# Patient Record
Sex: FEMALE | Race: WHITE | NOT HISPANIC OR LATINO | Employment: OTHER | URBAN - METROPOLITAN AREA
[De-identification: names, ages, dates, MRNs, and addresses within clinical notes are randomized per-mention and may not be internally consistent; named-entity substitution may affect disease eponyms.]

---

## 2017-01-13 ENCOUNTER — OFFICE VISIT (OUTPATIENT)
Dept: ORTHOPEDIC SURGERY | Facility: CLINIC | Age: 77
End: 2017-01-13

## 2017-01-13 VITALS — WEIGHT: 114 LBS | BODY MASS INDEX: 19.46 KG/M2 | TEMPERATURE: 98.1 F | HEIGHT: 64 IN

## 2017-01-13 DIAGNOSIS — S46.011A ROTATOR CUFF STRAIN, RIGHT, INITIAL ENCOUNTER: Primary | ICD-10-CM

## 2017-01-13 PROCEDURE — 99202 OFFICE O/P NEW SF 15 MIN: CPT | Performed by: ORTHOPAEDIC SURGERY

## 2017-01-13 NOTE — PROGRESS NOTES
"  Patient: Bhumi Gimenez    YOB: 1940    Medical Record Number: [id number]    Chief Complaints:  Right shoulder pain    History of Present Illness:     76 y.o. female patient who presents for evaluation of her right shoulder.  In September she traveled to [place].  When she got home she noticed that she was having some posterior right shoulder pain.  The pain persisted for several months.  The pain is now completely resolved.  She tells me the shoulder is \"completely fine\".  She debated canceling this appointment.  Pain started out as severe and constant.  The pain was lateral and posterior lateral.  Again, this is now resolved.  Denies any weakness or mechanical symptoms.    Allergies: No Known Allergies    Home Medications:      Current Outpatient Prescriptions:   •  aspirin 81 MG EC tablet, Take by mouth., Disp: , Rfl:   •  carvedilol (COREG) 12.5 MG tablet, Take 1 tablet by mouth 2 (two) times a day., Disp: 180 tablet, Rfl: 3  •  Ferrous Sulfate (IRON SUPPLEMENT PO), Take by mouth., Disp: , Rfl:   •  furosemide (LASIX) 20 MG tablet, Take 1 tablet by mouth 2 (two) times a day., Disp: 180 tablet, Rfl: 3  •  losartan (COZAAR) 50 MG tablet, Take 1 tablet by mouth daily., Disp: 90 tablet, Rfl: 3  •  pancrelipase, Lip-Prot-Amyl, (CREON) 14514 UNITS capsule delayed-release particles capsule, Take 2 capsules by mouth 3 (three) times a day with meals., Disp: 536 capsule, Rfl: 3  •  pantoprazole (PROTONIX) 40 MG EC tablet, Take 1 tablet by mouth daily., Disp: 90 tablet, Rfl: 3  •  potassium chloride ER (K-TAB) 20 MEQ tablet controlled-release ER tablet, Take 1 tablet by mouth daily., Disp: 90 tablet, Rfl: 3  •  Probiotic capsule, Take by mouth., Disp: , Rfl:     Past Medical History   Diagnosis Date   • Anemia of chronic disorder    • Aortic sclerosis    • Chronic deep venous thrombosis      chronic bilateral DVT   • Chronic kidney disease    • Chronic pancreatitis      ACUTE PANCREATITIS 11/20/2013 "   • Diverticulosis    • Esophageal reflux    • Hepatomegaly      LIKELY SECONDARY TO ALCOHOL USE   • History of alcohol use      quit 11/13   • Hypertension    • Left foot drop      LEFT FOOD DROP SECONDARY TO PERONEAL NERVE INJURY   • Osteoarthritis    • PAF (paroxysmal atrial fibrillation)    • Peripheral neuropathy      LEFT LOWER EXTREMITY   • Personal history of gallstones    • Personal history of malignant neoplasm of skin      S/P REMOVAL FROM LEFT KNEE   • Takotsubo syndrome      Likely secondary to acute illness in 11/13.  EF as low as 20-25% 11/27/13.  EF 63% by echo 4/14/14.   • Thrombocytopenia        Past Surgical History   Procedure Laterality Date   • Corneal transplant         Social History     Occupational History   • Not on file.     Social History Main Topics   • Smoking status: Never Smoker   • Smokeless tobacco: Not on file   • Alcohol use Not on file      Comment: 3-4 DRINKS PER DAY PREVIOUSLY.  QUIT 11/2013   • Drug use: Not on file   • Sexual activity: Not on file      Social History     Social History Narrative       Family History   Problem Relation Age of Onset   • Alcohol abuse Mother    • Other Mother      CORONARY ARTERIOSCLEROSIS   • Cancer Father      prostate       Review of Systems:      Constitutional: Denies fever, shaking or chills   Eyes: Denies change in visual acuity   HEENT: Denies nasal congestion or sore throat   Respiratory: Denies cough or shortness of breath   Cardiovascular: Denies chest pain or edema  Endocrine: Denies tremors, palpitations, intolerance of heat or cold, polyuria, polydipsia.  GI: Denies abdominal pain, nausea, vomiting, bloody stools or diarrhea  : Denies frequency, urgency, incontinence, retention, or nocturia.  Musculoskeletal: Denies numbness tingling or loss of motor function except as above  Integument: Denies rash, lesion or ulceration   Neurologic: Denies headache or focal weakness, deficits  Heme: Denies epistaxis, spontaneous or excessive  "bleeding, epistaxis, hematuria, melena, fatigue, enlarged or tender lymph nodes.      All other pertinent positives and negatives as noted above in HPI.      Physical Exam: 76 y.o. female    Vitals:    01/13/17 1541   Temp: 98.1 °F (36.7 °C)   TempSrc: Temporal Artery    Weight: 114 lb (51.7 kg)   Height: 63.5\" (161.3 cm)       General:  Patient is awake and alert.  Appears in no acute distress or discomfort.    Psych:  Affect and demeanor are appropriate.    Eyes:  Conjunctiva and sclera appear grossly normal.  Eyes track well and EOM seem to be intact.    Ears:  No gross abnormalities.  Hearing adequate for the exam.    Cardiovascular:  Regular rate and rhythm.    Lungs:  Good chest expansion.  Breathing unlabored.    Lymph:  No palpable masses or adenopathy in the affected extremity    Extremities:  The right shoulder is examined.  Skin is benign.  There is a mass posteriorly on the arm which she says has been there for a number of years, unchanged.  No tenderness.  Full motion.  Good strength with elevation and abduction.  She does have some subtle weakness with external rotation but no discomfort on exam.         Radiology:   None    Assessment/Plan:  Right shoulder pain, resolved    It sounds like she had a rotator cuff strain.  This is now resolved.  She can follow-up as needed.    Sabino Harley MD    01/13/2017    CC to Niko Cheatham MD    "

## 2017-03-10 RX ORDER — FERROUS SULFATE 325(65) MG
325 TABLET ORAL 2 TIMES DAILY
Qty: 60 TABLET | Refills: 0 | Status: SHIPPED | OUTPATIENT
Start: 2017-03-10 | End: 2017-04-19 | Stop reason: SDUPTHER

## 2017-04-19 RX ORDER — FERROUS SULFATE 325(65) MG
TABLET ORAL
Qty: 60 TABLET | Refills: 0 | Status: SHIPPED | OUTPATIENT
Start: 2017-04-19 | End: 2017-05-26 | Stop reason: SDUPTHER

## 2017-04-28 ENCOUNTER — OFFICE VISIT (OUTPATIENT)
Dept: FAMILY MEDICINE CLINIC | Facility: CLINIC | Age: 77
End: 2017-04-28

## 2017-04-28 VITALS
WEIGHT: 110.8 LBS | HEIGHT: 64 IN | TEMPERATURE: 97.8 F | HEART RATE: 56 BPM | DIASTOLIC BLOOD PRESSURE: 78 MMHG | OXYGEN SATURATION: 99 % | BODY MASS INDEX: 18.92 KG/M2 | SYSTOLIC BLOOD PRESSURE: 150 MMHG

## 2017-04-28 DIAGNOSIS — K85.90 PANCREATITIS, RECURRENT: ICD-10-CM

## 2017-04-28 DIAGNOSIS — M21.372 LEFT FOOT DROP: ICD-10-CM

## 2017-04-28 DIAGNOSIS — I48.0 PAROXYSMAL ATRIAL FIBRILLATION (HCC): ICD-10-CM

## 2017-04-28 DIAGNOSIS — K82.8 BILIARY DYSKINESIA: ICD-10-CM

## 2017-04-28 DIAGNOSIS — I10 HYPERTENSION, ESSENTIAL: Primary | ICD-10-CM

## 2017-04-28 DIAGNOSIS — G57.92 NEUROPATHY OF LEFT LOWER EXTREMITY: ICD-10-CM

## 2017-04-28 DIAGNOSIS — N18.30 CHRONIC KIDNEY FAILURE, STAGE 3 (MODERATE) (HCC): ICD-10-CM

## 2017-04-28 DIAGNOSIS — K58.0 IRRITABLE BOWEL SYNDROME WITH DIARRHEA: ICD-10-CM

## 2017-04-28 PROCEDURE — 99214 OFFICE O/P EST MOD 30 MIN: CPT | Performed by: INTERNAL MEDICINE

## 2017-04-28 PROCEDURE — G0439 PPPS, SUBSEQ VISIT: HCPCS | Performed by: INTERNAL MEDICINE

## 2017-04-28 RX ORDER — POTASSIUM CHLORIDE 1500 MG/1
20 TABLET, FILM COATED, EXTENDED RELEASE ORAL DAILY
Qty: 90 TABLET | Refills: 1 | Status: SHIPPED | OUTPATIENT
Start: 2017-04-28
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
## 2017-04-28 NOTE — PATIENT INSTRUCTIONS
Diagnoses and all orders for this visit:    Hypertension, essential  Comments:  Monitor BP at work 1-2 times per week  Limit salt    Chronic kidney failure, stage 3 (moderate)  Comments:  Continue same medws  Follow up if symptoms change or worsen    Irritable bowel syndrome with diarrhea  Comments:  Stable at present time  No other acute changes    Pancreatitis, recurrent on Creon  Comments:  Now bernabe at present time  Creon working well    Paroxysmal atrial fibrillation  Comments:  Follow up  as planneed  In regular rhytym    Left foot drop  Comments:  Doing much better    Biliary dyskinesia  Comments:  Follow up as planneed  Feels good at present time    Neuropathy of left lower extremity  Comments:  Continue same medws  Follow up as planned    Other orders  -     pancrelipase, Lip-Prot-Amyl, (CREON) 53451 UNITS capsule delayed-release particles capsule; Take 2 capsules by mouth 3 (Three) Times a Day With Meals.  -     potassium chloride ER (K-TAB) 20 MEQ tablet controlled-release ER tablet; Take 1 tablet by mouth Daily.

## 2017-04-28 NOTE — PROGRESS NOTES
Bhumi Gimenez is a 77 y.o. female   Chief Complaint   Patient presents with   • Hypertension     follow up pt doing well no complains           Subjective   Hypertension   The current episode started more than 1 year ago. The problem has been waxing and waning since onset. The problem is controlled. Pertinent negatives include no anxiety, blurred vision, chest pain, headaches, malaise/fatigue, neck pain, orthopnea, palpitations, peripheral edema, PND, shortness of breath or sweats. There are no associated agents to hypertension. There are no known risk factors for coronary artery disease. Past treatments include beta blockers and angiotensin blockers. The current treatment provides no improvement. There are no compliance problems.  There is no history of angina, kidney disease, CAD/MI, CVA, heart failure, left ventricular hypertrophy, PVD, renovascular disease, retinopathy or a thyroid problem. There is no history of chronic renal disease, coarctation of the aorta, hyperaldosteronism, hypercortisolism, hyperparathyroidism, a hypertension causing med, pheochromocytoma or sleep apnea.        Bhumi Gimenez is a 77 y.o.female who presents with:follow up on blood pressure.  BP was elevated at 150  No other acute changes.  Plans to play golf frequently.  Now doing well otherwise.  Still working 6 days per week total of 40 hours          The following portions of the patient's history were reviewed and updated as appropriate: past medical history, surgeries, family history, allergies, current medications, past social history and problem list.    A comprehensive review of 14 systems was peformed  Review of Systems   Constitutional: Negative.  Negative for chills, fatigue, fever, malaise/fatigue and unexpected weight change.   HENT: Negative.  Negative for ear pain, hearing loss, sinus pressure, sore throat and tinnitus.    Eyes: Negative.  Negative for blurred vision, pain, discharge and redness.   Respiratory:  "Negative.  Negative for cough, shortness of breath and wheezing.    Cardiovascular: Negative.  Negative for chest pain, palpitations, orthopnea, leg swelling and PND.   Gastrointestinal: Negative for abdominal pain, constipation, diarrhea and nausea.   Endocrine: Negative.  Negative for cold intolerance and heat intolerance.   Genitourinary: Negative.  Negative for difficulty urinating, flank pain and urgency.   Musculoskeletal: Negative.  Negative for back pain, joint swelling, myalgias and neck pain.   Skin: Negative.  Negative for rash and wound.   Allergic/Immunologic: Negative.  Negative for environmental allergies and food allergies.   Neurological: Negative.  Negative for dizziness, seizures, numbness and headaches.   Hematological: Negative.  Negative for adenopathy. Does not bruise/bleed easily.   Psychiatric/Behavioral: Negative.  Negative for decreased concentration, dysphoric mood and sleep disturbance. The patient is not nervous/anxious.    All other systems reviewed and are negative.      I have reviewed the patient's medical history in detail and updated the computerized patient record.      Objective   Vitals:    04/28/17 1248   BP: 150/78   BP Location: Left arm   Patient Position: Sitting   Pulse: 56   Temp: 97.8 °F (36.6 °C)   TempSrc: Oral   SpO2: 99%   Weight: 110 lb 12.8 oz (50.3 kg)   Height: 63.5\" (161.3 cm)           Physical Exam   Constitutional: She appears well-developed and well-nourished.   HENT:   Head: Normocephalic and atraumatic.   Right Ear: External ear normal.   Left Ear: External ear normal.   Nose: Nose normal.   Mouth/Throat: Oropharynx is clear and moist.   Eyes: Conjunctivae and EOM are normal. Pupils are equal, round, and reactive to light.   Neck: Normal range of motion. Neck supple.   Cardiovascular: Normal rate, regular rhythm, normal heart sounds and intact distal pulses.    Pulmonary/Chest: Effort normal and breath sounds normal.   Abdominal: Soft. Bowel sounds are " normal.   Musculoskeletal: Normal range of motion.   Neurological: She is alert. She has normal reflexes.   Skin: Skin is warm and dry.   Psychiatric: She has a normal mood and affect. Her behavior is normal. Judgment and thought content normal.       Procedures                          Assessment/Plan     Diagnoses and all orders for this visit:    Hypertension, essential  Comments:  Monitor BP at work 1-2 times per week  Limit salt    Chronic kidney failure, stage 3 (moderate)  Comments:  Continue same medws  Follow up if symptoms change or worsen    Irritable bowel syndrome with diarrhea  Comments:  Stable at present time  No other acute changes    Pancreatitis, recurrent on Creon  Comments:  Now bernabe at present time  Creon working well    Paroxysmal atrial fibrillation  Comments:  Follow up  as planneed  In regular rhytym    Left foot drop  Comments:  Doing much better    Biliary dyskinesia  Comments:  Follow up as planneed  Feels good at present time    Neuropathy of left lower extremity  Comments:  Continue same medws  Follow up as planned    Other orders  -     pancrelipase, Lip-Prot-Amyl, (CREON) 77510 UNITS capsule delayed-release particles capsule; Take 2 capsules by mouth 3 (Three) Times a Day With Meals.  -     potassium chloride ER (K-TAB) 20 MEQ tablet controlled-release ER tablet; Take 1 tablet by mouth Daily.         Niko Cheatham MD  4/28/2017  12:54 PM

## 2017-05-26 RX ORDER — FERROUS SULFATE 325(65) MG
TABLET ORAL
Qty: 60 TABLET | Refills: 5 | Status: SHIPPED | OUTPATIENT
Start: 2017-05-26 | End: 2017-11-08 | Stop reason: SDUPTHER

## 2017-06-12 ENCOUNTER — OFFICE VISIT (OUTPATIENT)
Dept: ORTHOPEDIC SURGERY | Facility: CLINIC | Age: 77
End: 2017-06-12

## 2017-06-12 VITALS — HEIGHT: 64 IN | WEIGHT: 109.8 LBS | BODY MASS INDEX: 18.74 KG/M2 | TEMPERATURE: 98.2 F

## 2017-06-12 DIAGNOSIS — G89.29 CHRONIC RIGHT SHOULDER PAIN: Primary | ICD-10-CM

## 2017-06-12 DIAGNOSIS — M25.511 CHRONIC RIGHT SHOULDER PAIN: Primary | ICD-10-CM

## 2017-06-12 PROCEDURE — 99213 OFFICE O/P EST LOW 20 MIN: CPT | Performed by: ORTHOPAEDIC SURGERY

## 2017-06-12 PROCEDURE — 20610 DRAIN/INJ JOINT/BURSA W/O US: CPT | Performed by: ORTHOPAEDIC SURGERY

## 2017-06-12 PROCEDURE — 73030 X-RAY EXAM OF SHOULDER: CPT | Performed by: ORTHOPAEDIC SURGERY

## 2017-06-12 RX ADMIN — BUPIVACAINE HYDROCHLORIDE 2 ML: 5 INJECTION, SOLUTION PERINEURAL at 14:09

## 2017-06-12 RX ADMIN — LIDOCAINE HYDROCHLORIDE 2 ML: 10 INJECTION, SOLUTION INFILTRATION; PERINEURAL at 14:09

## 2017-06-12 RX ADMIN — METHYLPREDNISOLONE ACETATE 160 MG: 80 INJECTION, SUSPENSION INTRA-ARTICULAR; INTRALESIONAL; INTRAMUSCULAR; SOFT TISSUE at 14:09

## 2017-06-13 RX ORDER — METHYLPREDNISOLONE ACETATE 80 MG/ML
160 INJECTION, SUSPENSION INTRA-ARTICULAR; INTRALESIONAL; INTRAMUSCULAR; SOFT TISSUE
Status: COMPLETED | OUTPATIENT
Start: 2017-06-12 | End: 2017-06-12

## 2017-06-13 RX ORDER — LIDOCAINE HYDROCHLORIDE 10 MG/ML
2 INJECTION, SOLUTION INFILTRATION; PERINEURAL
Status: COMPLETED | OUTPATIENT
Start: 2017-06-12 | End: 2017-06-12

## 2017-06-13 RX ORDER — BUPIVACAINE HYDROCHLORIDE 5 MG/ML
2 INJECTION, SOLUTION PERINEURAL
Status: COMPLETED | OUTPATIENT
Start: 2017-06-12 | End: 2017-06-12

## 2017-06-13 NOTE — PROGRESS NOTES
Patient:Bhumi Gimenez    YOB: 1940    Medical Record Number:[id number]    Chief Complaints:  Right shoulder pain    History of Present Illness:     77 y.o. female patient who presents for evaluation of her right shoulder.  She tells me that her pain recurred about 2 weeks ago.  She cannot recall any specific inciting event or factor but the pain seemed to come on rather suddenly.  Her pain is in the same spot as before, along the posterior aspect the shoulder.  She describes as moderate, intermittent, and sharp.  The pain is worse with reaching and lifting.  No shooting pain down the arm.  No numbness or paresthesias.    Allergies:No Known Allergies    Home Medications:    Current Outpatient Prescriptions:   •  aspirin 81 MG EC tablet, Take by mouth., Disp: , Rfl:   •  carvedilol (COREG) 12.5 MG tablet, Take 1 tablet by mouth 2 (two) times a day., Disp: 180 tablet, Rfl: 3  •  ferrous sulfate 325 (65 FE) MG tablet, TAKE 1 TABLET TWICE DAILY, Disp: 60 tablet, Rfl: 5  •  furosemide (LASIX) 20 MG tablet, Take 1 tablet by mouth 2 (two) times a day., Disp: 180 tablet, Rfl: 3  •  losartan (COZAAR) 50 MG tablet, Take 1 tablet by mouth daily., Disp: 90 tablet, Rfl: 3  •  pancrelipase, Lip-Prot-Amyl, (CREON) 99510 UNITS capsule delayed-release particles capsule, Take 2 capsules by mouth 3 (Three) Times a Day With Meals., Disp: 540 capsule, Rfl: 1  •  pantoprazole (PROTONIX) 40 MG EC tablet, Take 1 tablet by mouth daily., Disp: 90 tablet, Rfl: 3  •  potassium chloride ER (K-TAB) 20 MEQ tablet controlled-release ER tablet, Take 1 tablet by mouth Daily., Disp: 90 tablet, Rfl: 1  •  Probiotic capsule, Take by mouth., Disp: , Rfl:     Past Medical History:   Diagnosis Date   • Anemia of chronic disorder    • Aortic sclerosis    • Chronic deep venous thrombosis     chronic bilateral DVT   • Chronic kidney disease    • Chronic pancreatitis     ACUTE PANCREATITIS 11/20/2013   • Diverticulosis    • Esophageal reflux     • Hepatomegaly     LIKELY SECONDARY TO ALCOHOL USE   • History of alcohol use     quit 11/13   • Hypertension    • Left foot drop     LEFT FOOD DROP SECONDARY TO PERONEAL NERVE INJURY   • Osteoarthritis    • PAF (paroxysmal atrial fibrillation)    • Peripheral neuropathy     LEFT LOWER EXTREMITY   • Personal history of gallstones    • Personal history of malignant neoplasm of skin     S/P REMOVAL FROM LEFT KNEE   • Takotsubo syndrome     Likely secondary to acute illness in 11/13.  EF as low as 20-25% 11/27/13.  EF 63% by echo 4/14/14.   • Thrombocytopenia        Past Surgical History:   Procedure Laterality Date   • CORNEAL TRANSPLANT         Social History     Occupational History   • Not on file.     Social History Main Topics   • Smoking status: Never Smoker   • Smokeless tobacco: Not on file   • Alcohol use Not on file      Comment: 3-4 DRINKS PER DAY PREVIOUSLY.  QUIT 11/2013   • Drug use: Not on file   • Sexual activity: Not on file      Social History     Social History Narrative       Family History   Problem Relation Age of Onset   • Alcohol abuse Mother    • Other Mother      CORONARY ARTERIOSCLEROSIS   • Cancer Father      prostate       Review of Systems:      Constitutional: Denies fever, shaking or chills   Eyes: Denies change in visual acuity   HEENT: Denies nasal congestion or sore throat   Respiratory: Denies cough or shortness of breath   Cardiovascular: Denies chest pain or edema  Endocrine: Denies tremors, palpitations, intolerance of heat or cold, polyuria, polydipsia.  GI: Denies abdominal pain, nausea, vomiting, bloody stools or diarrhea  : Denies frequency, urgency, incontinence, retention, or nocturia.  Musculoskeletal: Denies numbness tingling or loss of motor function except as above  Integument: Denies rash, lesion or ulceration   Neurologic: Denies headache or focal weakness, deficits  Heme: Denies epistaxis, spontaneous or excessive bleeding, epistaxis, hematuria, melena, fatigue,  "enlarged or tender lymph nodes.      All other pertinent positives and negatives as noted above in HPI.    Physical Exam:77 y.o. female  Vitals:    06/12/17 1540   Temp: 98.2 °F (36.8 °C)   TempSrc: Temporal Artery    Weight: 109 lb 12.8 oz (49.8 kg)   Height: 63.5\" (161.3 cm)       General:  Patient is awake and alert.  Appears in no acute distress or discomfort.    Psych:  Affect and demeanor are appropriate.    Extremities:  The right shoulder is examined.  Skin is benign.  She does have the mass over the posterior aspect the arm which is unchanged.  No significant tenderness.  Full motion.  She does have discomfort with elevation in the scapular plane and subtle weakness.  Otherwise, her exam is benign today.    Imaging:  AP, scapular Y, and axillary views of the right shoulder are ordered and reviewed today.  No comparison films are available.  The x-rays show some sclerosis of the acromion and a mildly diminished acromiohumeral interval measuring 6 mm.    Assessment/Plan:  Right shoulder rotator cuff tear    Her x-rays suggest a long-standing rotator cuff tear although, remarkably, her strength is actually fairly good.  We talked about options.  I recommended that we try an injection.  The risks, benefits, and alternatives were discussed.  She consented to proceed.  Going forward, I will release her to follow-up as needed.  If her symptoms persist or recur, I told her that I will be happy to see her back.    Large Joint Arthrocentesis  Date/Time: 6/12/2017 2:09 PM  Consent given by: patient  Site marked: site marked  Timeout: Immediately prior to procedure a time out was called to verify the correct patient, procedure, equipment, support staff and site/side marked as required   Supporting Documentation  Indications: pain and joint swelling   Procedure Details  Location: shoulder - R subacromial bursa  Preparation: Patient was prepped and draped in the usual sterile fashion  Needle size: 25 G  Approach: " posterior  Medications administered: 2 mL lidocaine 1 %; 160 mg methylPREDNISolone acetate 80 MG/ML; 2 mL bupivacaine  Patient tolerance: patient tolerated the procedure well with no immediate complications            Sabino Harley MD    06/12/2017

## 2017-10-16 ENCOUNTER — OFFICE VISIT (OUTPATIENT)
Dept: ORTHOPEDIC SURGERY | Facility: CLINIC | Age: 77
End: 2017-10-16

## 2017-10-16 VITALS — TEMPERATURE: 99.4 F | WEIGHT: 106 LBS | HEIGHT: 62 IN | BODY MASS INDEX: 19.51 KG/M2

## 2017-10-16 DIAGNOSIS — M75.121 COMPLETE TEAR OF RIGHT ROTATOR CUFF: Primary | ICD-10-CM

## 2017-10-16 PROCEDURE — 99213 OFFICE O/P EST LOW 20 MIN: CPT | Performed by: ORTHOPAEDIC SURGERY

## 2017-10-17 NOTE — PROGRESS NOTES
Patient:Bhumi Gimenez    YOB: 1940    Medical Record Number:[id number]    Chief Complaints:  New right shoulder injury with increased shoulder pain and weakness    History of Present Illness:     77 y.o. female patient who presents for follow-up of her right shoulder.  She tells me the last injection helped but the effects were transient.  She went on a trip to [place] and was hitting a golf ball when she felt a sharp pain in her right shoulder.  Her pain and weakness are now worse.  She describes pain as moderate, constant, and aching.  The pain is worse with reaching and lifting.  She tells me that she is having to use the left arm to raise the right at times.    Allergies:No Known Allergies    Home Medications:    Current Outpatient Prescriptions:   •  aspirin 81 MG EC tablet, Take by mouth., Disp: , Rfl:   •  carvedilol (COREG) 12.5 MG tablet, Take 1 tablet by mouth 2 (two) times a day., Disp: 180 tablet, Rfl: 3  •  ferrous sulfate 325 (65 FE) MG tablet, TAKE 1 TABLET TWICE DAILY, Disp: 60 tablet, Rfl: 5  •  furosemide (LASIX) 20 MG tablet, Take 1 tablet by mouth 2 (two) times a day., Disp: 180 tablet, Rfl: 3  •  losartan (COZAAR) 50 MG tablet, Take 1 tablet by mouth daily., Disp: 90 tablet, Rfl: 3  •  pancrelipase, Lip-Prot-Amyl, (CREON) 13822 UNITS capsule delayed-release particles capsule, Take 2 capsules by mouth 3 (Three) Times a Day With Meals., Disp: 540 capsule, Rfl: 1  •  pantoprazole (PROTONIX) 40 MG EC tablet, Take 1 tablet by mouth daily., Disp: 90 tablet, Rfl: 3  •  potassium chloride ER (K-TAB) 20 MEQ tablet controlled-release ER tablet, Take 1 tablet by mouth Daily., Disp: 90 tablet, Rfl: 1  •  Probiotic capsule, Take by mouth., Disp: , Rfl:     Past Medical History:   Diagnosis Date   • Anemia of chronic disorder    • Aortic sclerosis    • Chronic deep venous thrombosis     chronic bilateral DVT   • Chronic kidney disease    • Chronic pancreatitis     ACUTE PANCREATITIS  11/20/2013   • Diverticulosis    • Esophageal reflux    • Hepatomegaly     LIKELY SECONDARY TO ALCOHOL USE   • History of alcohol use     quit 11/13   • Hypertension    • Left foot drop     LEFT FOOD DROP SECONDARY TO PERONEAL NERVE INJURY   • Osteoarthritis    • PAF (paroxysmal atrial fibrillation)    • Peripheral neuropathy     LEFT LOWER EXTREMITY   • Personal history of gallstones    • Personal history of malignant neoplasm of skin     S/P REMOVAL FROM LEFT KNEE   • Takotsubo syndrome     Likely secondary to acute illness in 11/13.  EF as low as 20-25% 11/27/13.  EF 63% by echo 4/14/14.   • Thrombocytopenia        Past Surgical History:   Procedure Laterality Date   • CORNEAL TRANSPLANT         Social History     Occupational History   • Not on file.     Social History Main Topics   • Smoking status: Never Smoker   • Smokeless tobacco: Not on file   • Alcohol use Not on file      Comment: 3-4 DRINKS PER DAY PREVIOUSLY.  QUIT 11/2013   • Drug use: Not on file   • Sexual activity: Not on file      Social History     Social History Narrative       Family History   Problem Relation Age of Onset   • Alcohol abuse Mother    • Other Mother      CORONARY ARTERIOSCLEROSIS   • Cancer Father      prostate       Review of Systems:      Constitutional: Denies fever, shaking or chills   Eyes: Denies change in visual acuity   HEENT: Denies nasal congestion or sore throat   Respiratory: Denies cough or shortness of breath   Cardiovascular: Denies chest pain or edema  Endocrine: Denies tremors, palpitations, intolerance of heat or cold, polyuria, polydipsia.  GI: Denies abdominal pain, nausea, vomiting, bloody stools or diarrhea  : Denies frequency, urgency, incontinence, retention, or nocturia.  Musculoskeletal: Denies numbness tingling or loss of motor function except as above  Integument: Denies rash, lesion or ulceration   Neurologic: Denies headache or focal weakness, deficits  Heme: Denies epistaxis, spontaneous or  "excessive bleeding, epistaxis, hematuria, melena, fatigue, enlarged or tender lymph nodes.      All other pertinent positives and negatives as noted above in HPI.    Physical Exam:77 y.o. female  Vitals:    10/16/17 1535   Temp: 99.4 °F (37.4 °C)   TempSrc: Temporal Artery    Weight: 106 lb (48.1 kg)   Height: 62\" (157.5 cm)       General:  Patient is awake and alert.  Appears in no acute distress or discomfort.    Psych:  Affect and demeanor are appropriate.    Extremities:  The right shoulder is examined.  Skin is benign.  No atrophy, swellings, or masses.  She struggles with active elevation.  She has pain and weakness with elevation in the scapular plane.  Negative external rotation lag sign.  No instability of the shoulder.  Good motor and sensory function in the hand and wrist.  Palpable radial pulse.    Imaging:  None    Assessment/Plan:  Right rotator cuff tear    We discussed options including repeating the injection and/or physical therapy.  She is not interested in either of those options.  She is concerned that this issue is only going to continue getting worse.  She has expressed interest in pursuing further workup and potentially surgical options.  I'm going to refer her for an MRI and we will come up with a plan pending review that study.    Sabino Harley MD    10/16/2017    "

## 2017-10-26 ENCOUNTER — HOSPITAL ENCOUNTER (OUTPATIENT)
Dept: MRI IMAGING | Facility: HOSPITAL | Age: 77
Discharge: HOME | End: 2017-10-26
Attending: ORTHOPAEDIC SURGERY | Admitting: ORTHOPAEDIC SURGERY

## 2017-10-26 DIAGNOSIS — M75.121 COMPLETE TEAR OF RIGHT ROTATOR CUFF: ICD-10-CM

## 2017-10-26 PROCEDURE — 73221 MRI JOINT UPR EXTREM W/O DYE: CPT

## 2017-10-30 ENCOUNTER — TELEPHONE (OUTPATIENT)
Dept: ORTHOPEDIC SURGERY | Facility: CLINIC | Age: 77
End: 2017-10-30

## 2017-10-30 ENCOUNTER — PREP FOR SURGERY (OUTPATIENT)
Dept: OTHER | Facility: HOSPITAL | Age: 77
End: 2017-10-30

## 2017-10-30 DIAGNOSIS — M75.101 ROTATOR CUFF TEAR ARTHROPATHY, RIGHT: Primary | ICD-10-CM

## 2017-10-30 DIAGNOSIS — M12.811 ROTATOR CUFF TEAR ARTHROPATHY, RIGHT: Primary | ICD-10-CM

## 2017-10-30 RX ORDER — SODIUM CHLORIDE 0.9 % (FLUSH) 0.9 %
1-10 SYRINGE (ML) INJECTION AS NEEDED
Status: CANCELLED | OUTPATIENT
Start: 2017-11-16

## 2017-10-30 RX ORDER — CEFAZOLIN SODIUM 2 G/100ML
2 INJECTION, SOLUTION INTRAVENOUS ONCE
Status: CANCELLED | OUTPATIENT
Start: 2017-11-16 | End: 2017-10-30

## 2017-10-31 PROBLEM — M75.101 ROTATOR CUFF TEAR ARTHROPATHY, RIGHT: Status: ACTIVE | Noted: 2017-10-31

## 2017-10-31 PROBLEM — M12.811 ROTATOR CUFF TEAR ARTHROPATHY, RIGHT: Status: ACTIVE | Noted: 2017-10-31

## 2017-11-08 ENCOUNTER — APPOINTMENT (OUTPATIENT)
Dept: PREADMISSION TESTING | Facility: HOSPITAL | Age: 77
End: 2017-11-08

## 2017-11-08 VITALS
DIASTOLIC BLOOD PRESSURE: 76 MMHG | HEART RATE: 63 BPM | RESPIRATION RATE: 16 BRPM | WEIGHT: 108 LBS | OXYGEN SATURATION: 99 % | BODY MASS INDEX: 18.44 KG/M2 | SYSTOLIC BLOOD PRESSURE: 164 MMHG | TEMPERATURE: 98.1 F | HEIGHT: 64 IN

## 2017-11-08 DIAGNOSIS — M75.101 ROTATOR CUFF TEAR ARTHROPATHY, RIGHT: ICD-10-CM

## 2017-11-08 DIAGNOSIS — M12.811 ROTATOR CUFF TEAR ARTHROPATHY, RIGHT: ICD-10-CM

## 2017-11-08 LAB
ANION GAP SERPL CALCULATED.3IONS-SCNC: 13.2 MMOL/L
BACTERIA UR QL AUTO: NORMAL /HPF
BILIRUB UR QL STRIP: NEGATIVE
BUN BLD-MCNC: 16 MG/DL (ref 8–23)
BUN/CREAT SERPL: 21.9 (ref 7–25)
CALCIUM SPEC-SCNC: 9.1 MG/DL (ref 8.6–10.5)
CHLORIDE SERPL-SCNC: 99 MMOL/L (ref 98–107)
CLARITY UR: CLEAR
CO2 SERPL-SCNC: 21.8 MMOL/L (ref 22–29)
COLOR UR: YELLOW
CREAT BLD-MCNC: 0.73 MG/DL (ref 0.57–1)
DEPRECATED RDW RBC AUTO: 42.2 FL (ref 37–54)
ERYTHROCYTE [DISTWIDTH] IN BLOOD BY AUTOMATED COUNT: 12.3 % (ref 11.7–13)
GFR SERPL CREATININE-BSD FRML MDRD: 77 ML/MIN/1.73
GLUCOSE BLD-MCNC: 91 MG/DL (ref 65–99)
GLUCOSE UR STRIP-MCNC: NEGATIVE MG/DL
HCT VFR BLD AUTO: 33.3 % (ref 35.6–45.5)
HGB BLD-MCNC: 11 G/DL (ref 11.9–15.5)
HGB UR QL STRIP.AUTO: NEGATIVE
HYALINE CASTS UR QL AUTO: NORMAL /LPF
KETONES UR QL STRIP: NEGATIVE
LEUKOCYTE ESTERASE UR QL STRIP.AUTO: ABNORMAL
MCH RBC QN AUTO: 31.2 PG (ref 26.9–32)
MCHC RBC AUTO-ENTMCNC: 33 G/DL (ref 32.4–36.3)
MCV RBC AUTO: 94.3 FL (ref 80.5–98.2)
NITRITE UR QL STRIP: NEGATIVE
PH UR STRIP.AUTO: 6.5 [PH] (ref 5–8)
PLATELET # BLD AUTO: 266 10*3/MM3 (ref 140–500)
PMV BLD AUTO: 10.5 FL (ref 6–12)
POTASSIUM BLD-SCNC: 3.9 MMOL/L (ref 3.5–5.2)
PROT UR QL STRIP: NEGATIVE
RBC # BLD AUTO: 3.53 10*6/MM3 (ref 3.9–5.2)
RBC # UR: NORMAL /HPF
REF LAB TEST METHOD: NORMAL
SODIUM BLD-SCNC: 134 MMOL/L (ref 136–145)
SP GR UR STRIP: 1.01 (ref 1–1.03)
SQUAMOUS #/AREA URNS HPF: NORMAL /HPF
UROBILINOGEN UR QL STRIP: ABNORMAL
WBC NRBC COR # BLD: 6.7 10*3/MM3 (ref 4.5–10.7)
WBC UR QL AUTO: NORMAL /HPF

## 2017-11-08 PROCEDURE — 81001 URINALYSIS AUTO W/SCOPE: CPT | Performed by: ORTHOPAEDIC SURGERY

## 2017-11-08 PROCEDURE — 93010 ELECTROCARDIOGRAM REPORT: CPT | Performed by: INTERNAL MEDICINE

## 2017-11-08 PROCEDURE — 93005 ELECTROCARDIOGRAM TRACING: CPT

## 2017-11-08 PROCEDURE — 85027 COMPLETE CBC AUTOMATED: CPT | Performed by: ORTHOPAEDIC SURGERY

## 2017-11-08 PROCEDURE — 80048 BASIC METABOLIC PNL TOTAL CA: CPT | Performed by: ORTHOPAEDIC SURGERY

## 2017-11-08 PROCEDURE — 36415 COLL VENOUS BLD VENIPUNCTURE: CPT

## 2017-11-08 RX ORDER — LANOLIN ALCOHOL/MO/W.PET/CERES
325 CREAM (GRAM) TOPICAL
COMMUNITY

## 2017-11-08 NOTE — DISCHARGE INSTRUCTIONS
Take the following medications the morning of surgery with a small sip of water:    CARVEDILOL  LOSARTAN  PANTOPRAZOLE  CREON      General Instructions:  • Do not eat solid food after midnight the night before surgery.  • You may drink clear liquids day of surgery but must stop at least one hour before your hospital arrival time @ 0530 AM STOP DRINKING!!!  • It is beneficial for you to have a clear drink that contains carbohydrates the day of surgery.  We suggest a 12 to 20 ounce bottle of Gatorade or Powerade for non-diabetic patients or a 12 to 20 ounce bottle of G2 or Powerade Zero for diabetic patients.     Clear liquids are liquids you can see through.  Nothing red in color.     Plain water                               Sports drinks  Sodas                                   Gelatin (Jell-O)  Fruit juices without pulp such as white grape juice and apple juice  Popsicles that contain no fruit or yogurt  Tea or coffee (no cream or milk added)  Gatorade / Powerade  G2 / Powerade Zero    • Patients who avoid smoking, chewing tobacco and alcohol for 4 weeks prior to surgery have a reduced risk of post-operative complications.  Quit smoking as many days before surgery as you can.  • Do not smoke, use chewing tobacco or drink alcohol the day of surgery.   • Bring any papers given to you in the doctor’s office.  • Wear clean comfortable clothes and socks.  • Do not wear contact lenses or make-up.  Bring a case for your glasses.   • Bring crutches or walker if applicable.  • Remove all piercings.  Leave jewelry and any other valuables at home.  • The Pre-Admission Testing nurse will instruct you to bring medications if unable to obtain an accurate list in Pre-Admission Testing.        Preventing a Surgical Site Infection:  • For 2 to 3 days before surgery, avoid shaving with a razor because the razor can irritate skin and make it easier to develop an infection.  • The night prior to surgery sleep in a clean bed with  clean clothing.  Do not allow pets to sleep with you.  • Shower on the morning of surgery using a fresh bar of anti-bacterial soap (such as Dial) and clean washcloth.  Dry with a clean towel and dress in clean clothing.  • Ask your surgeon if you will be receiving antibiotics prior to surgery.  • Make sure you, your family, and all healthcare providers clean their hands with soap and water or an alcohol based hand  before caring for you or your wound.    Day of surgery:11- ARRIVE @ 0630 AM REPORT TO THE MAIN OR   Upon arrival, a Pre-op nurse and Anesthesiologist will review your health history, obtain vital signs, and answer questions you may have.  The only belongings needed at this time will be your home medications.  If you are staying overnight your family can leave the rest of your belongings in the car and bring them to your room later.  A Pre-op nurse will start an IV and you may receive medication in preparation for surgery, including something to help you relax.  Your family will be able to see you in the Pre-op area.  While you are in surgery your family should notify the waiting room  if they leave the waiting room area and provide a contact phone number.    Please be aware that surgery does come with discomfort.  We want to make every effort to control your discomfort so please discuss any uncontrolled symptoms with your nurse.   Your doctor will most likely have prescribed pain medications.      If you are going home after surgery you will receive individualized written care instructions before being discharged.  A responsible adult must drive you to and from the hospital on the day of your surgery and stay with you for 24 hours.    If you are staying overnight following surgery, you will be transported to your hospital room following the recovery period.  [facility] has all private rooms.    If you have any questions please call Pre-Admission Testing at  [phone].  Deductibles and co-payments are collected on the day of service. Please be prepared to pay the required co-pay, deductible or deposit on the day of service as defined by your plan.      2% CHLORAHEXIDINE GLUCONATE* CLOTH  Preparing or “prepping” skin before surgery can reduce the risk of infection at the surgical site. To make the process easier, [facility] has chosen disposable cloths moistened with a rinse-free, 2% Chlorhexidine Gluconate (CHG) antiseptic solution. The steps below outline the prepping process and should be carefully followed.        Use the prep cloth on the area that is circled in the diagram             Directions Night before Surgery 11- PM PRIOR TO SURGERY (RIGHT SHOULDER AREA)  1) Shower using a fresh bar of anti-bacterial soap (such as Dial) and clean washcloth.  Use a clean towel to completely dry your skin.  2) Do not use any lotions, oils or creams on your skin.  3) Open the package and remove 1 cloth, wipe your skin for 30 seconds in a circular motion.  Allow to dry for 3 minutes.  4) Repeat #3 with second cloth.  5) Do not touch your eyes, ears, or mouth with the prep cloth.  6) Allow the wet prep solution to air dry.  7) Discard the prep cloth and wash your hands with soap and water.   8) Dress in clean bed clothes and sleep on fresh clean bed sheets.   9) You may experience some temporary itching after the prep.    Directions Day of Surgery 11- AM PRIOR TO SURGERY (RIGHT SHOULDER AREA)  1) Repeat steps 1,2,3,4,5,6,7, and 9.   2) Dress in clean clothes before coming to the hospital.    BACTROBAN NASAL OINTMENT  There are many germs normally in your nose. Bactroban is an ointment that will help reduce these germs. Please follow these instructions for Bactroban use:    ____The day before surgery in the morning  Euae__[phone]______    ____The day before surgery in the evening              Iceb__[phone]_____    ____The day of surgery in the  morning    Eprd__[phone]______    **Squirt ½ package of Bactroban Ointment onto a cotton applicator and apply to inside of 1st nostril.  Squirt the remaining Bactroban and apply to the inside of the other nostril.

## 2017-11-13 ENCOUNTER — OFFICE VISIT (OUTPATIENT)
Dept: ORTHOPEDIC SURGERY | Facility: CLINIC | Age: 77
End: 2017-11-13

## 2017-11-13 VITALS — TEMPERATURE: 98.5 F | WEIGHT: 106.6 LBS | BODY MASS INDEX: 20.93 KG/M2 | HEIGHT: 60 IN

## 2017-11-13 DIAGNOSIS — Z01.818 PRE-OP EVALUATION: Primary | ICD-10-CM

## 2017-11-13 PROCEDURE — S0260 H&P FOR SURGERY: HCPCS | Performed by: ORTHOPAEDIC SURGERY

## 2017-11-14 NOTE — PROGRESS NOTES
History & Physical         Patient: Bhumi Gimenez    YOB: 1940    Medical Record Number: [id number]    Chief Complaints:   Chief Complaint   Patient presents with   • Right Shoulder - Pre-op Exam       History of Present Illness: 77 y.o. female presents with   Chief Complaint   Patient presents with   • Right Shoulder - Pre-op Exam   Reports a long history of progressively worsening pain, motion loss, and dysfunction.  Describes current pain as severe.  Has failed prolonged conservative treatment.  Denies any new complaints or issues.    Allergies: No Known Allergies    Medications:   Home Medications:    Current Outpatient Prescriptions:   •  aspirin 81 MG EC tablet, Take 81 mg by mouth Daily. HOLD PRIOR TO SURGERY, Disp: , Rfl:   •  carvedilol (COREG) 12.5 MG tablet, Take 1 tablet by mouth 2 (two) times a day., Disp: 180 tablet, Rfl: 3  •  ferrous sulfate 325 (65 FE) MG EC tablet, Take 325 mg by mouth Daily With Breakfast., Disp: , Rfl:   •  furosemide (LASIX) 20 MG tablet, Take 1 tablet by mouth 2 (two) times a day., Disp: 180 tablet, Rfl: 3  •  losartan (COZAAR) 50 MG tablet, Take 1 tablet by mouth daily., Disp: 90 tablet, Rfl: 3  •  pancrelipase, Lip-Prot-Amyl, (CREON) 58740 UNITS capsule delayed-release particles capsule, Take 2 capsules by mouth 3 (Three) Times a Day With Meals., Disp: 540 capsule, Rfl: 1  •  pantoprazole (PROTONIX) 40 MG EC tablet, Take 1 tablet by mouth daily., Disp: 90 tablet, Rfl: 3  •  potassium chloride ER (K-TAB) 20 MEQ tablet controlled-release ER tablet, Take 1 tablet by mouth Daily., Disp: 90 tablet, Rfl: 1  •  Probiotic capsule, Take 1 tablet by mouth Daily. HOLD PRIOR TO SURGERY, Disp: , Rfl:     Past Medical History:   Diagnosis Date   • Anemia of chronic disorder    • Aortic sclerosis    • Cancer     SKIN CANCER REMOVED FROM LEFT KNEE   • Chronic deep venous thrombosis     chronic bilateral DVT   • Chronic kidney disease    • Chronic pancreatitis     ACUTE  PANCREATITIS 11/20/2013   • Diverticulosis    • Esophageal reflux    • Full dentures    • Hepatomegaly     LIKELY SECONDARY TO ALCOHOL USE   • History of alcohol use     quit 11/13   • History of transfusion    • Hypertension    • Left foot drop     LEFT FOOD DROP SECONDARY TO PERONEAL NERVE INJURY   • Osteoarthritis     OSTEO   • PAF (paroxysmal atrial fibrillation)    • Peripheral neuropathy     LEFT LOWER EXTREMITY   • Personal history of gallstones    • Personal history of malignant neoplasm of skin     S/P REMOVAL FROM LEFT KNEE   • Takotsubo syndrome     Likely secondary to acute illness in 11/13.  EF as low as 20-25% 11/27/13.  EF 63% by echo 4/14/14.   • Thrombocytopenia           Past Surgical History:   Procedure Laterality Date   • CHOLECYSTECTOMY     • CORNEAL TRANSPLANT     • MULTIPLE TOOTH EXTRACTIONS      FULL MOUTH TEETH REMOVED          Social History     Occupational History   • Not on file.     Social History Main Topics   • Smoking status: Never Smoker   • Smokeless tobacco: Never Used   • Alcohol use No   • Drug use: No   • Sexual activity: Defer      Social History     Social History Narrative          Family History   Problem Relation Age of Onset   • Alcohol abuse Mother    • Other Mother      CORONARY ARTERIOSCLEROSIS   • Cancer Father      prostate   • Malig Hyperthermia Neg Hx        Review of Systems:     Constitutional:  Denies fever, shaking or chills   Eyes:  Denies change in visual acuity   HEENT:  Denies nasal congestion or sore throat   Respiratory:  Denies cough or shortness of breath   Cardiovascular:  Denies chest pain or edema   GI:  Denies abdominal pain, nausea, vomiting, bloody stools or diarrhea   Musculoskeletal:  Denies numbness tingling or loss of motor function except as outlined above in history of present illness.  Integument:  Denies rash, lesion or ulceration   Neurologic:  Denies headache or focal weakness, deficits      All other pertinent positives and negatives  as noted above in HPI.    Physical Exam: 77 y.o. female  General:  Patient is awake and alert.  Appears in no acute distress or discomfort.    Psych:  Affect and demeanor are appropriate.    Eyes:  Conjunctiva and sclera appear grossly normal.  Eyes track well and EOM seem to be intact.    Ears:  No gross abnormalities.  Hearing adequate for the exam.    Cardiovascular:  Regular rate and rhythm.    Lungs:  Good chest expansion.  Breathing unlabored.    Lymph:  No palpable adenopathy about neck or axilla.    Neck:  Supple.  Normal ROM.  Negative Spurling's for shoulder or arm pain.    Right upper extremity:  Skin benign and intact without evidence for swelling, masses or atrophy.  No palpable masses.  No focal areas of exquisite tenderness.  Shoulder ROM limited and uncomfortable--for elevation is approximately 150°, external rotation 30-35°, internal rotation to back pocket.  No evident instability or apprehension.  Hornblowers negative.  ER lag sign negative.  Good strength in deltoid, wrist and hand.  Intact sensation in arm, hand.  Palpable radial pulse with brisk cap refill.    Diagnostic Tests:  Lab Results   Component Value Date    GLUCOSE 91 11/08/2017    CALCIUM 9.1 11/08/2017     (L) 11/08/2017    K 3.9 11/08/2017    CO2 21.8 (L) 11/08/2017    CL 99 11/08/2017    BUN 16 11/08/2017    CREATININE 0.73 11/08/2017    EGFRIFAFRI 50 (L) 04/28/2017    EGFRIFNONA 77 11/08/2017    BCR 21.9 11/08/2017    ANIONGAP 13.2 11/08/2017     Lab Results   Component Value Date    WBC 6.70 11/08/2017    HGB 11.0 (L) 11/08/2017    HCT 33.3 (L) 11/08/2017    MCV 94.3 11/08/2017     11/08/2017     No results found for: INR, PROTIME     Imaging: Previous x-rays and MRI of the right shoulder are again reviewed.  The x-rays shows no profound abnormalities.  The MRI does show a large, retracted rotator cuff tear with significant atrophy and associated rotator cuff tear arthropathy.    Assessment:  Right shoulder rotator  cuff tear arthropathy    Plan: We had a thorough discussion regarding the risks, benefits and alternatives to an arthroplasty and non-surgical management versus surgery.  I explained that surgical risks include infection, hematoma, hardware related complications including failure of fixation, loosening, fracture, persistent pain and/or loss of motion, iatrogenic nerve and/or blood vessel injury resulting in permanent weakness, numbness or dysfunction, DVT, PE, positioning related neuropraxia, and anesthesia related complications resulting in death.  We discussed the indication for a reverse as opposed to a standard total shoulder and the risks inherent to the reverse including notching, glenoid loosening, instability, and traction related neuropraxia, any of which could result in persistent pain or problems requiring further surgery.  Lastly, we discussed the rehab and all that will be expected of the patient post operatively to ensure an optimal outcome.  The patient voiced understanding of the risks, benefits, and alternative forms of treatment that were discussed and the patient consents to proceed with the reverse arthroplasty.        Date: 11/13/2017    Sabino Harley MD

## 2017-11-16 ENCOUNTER — ANESTHESIA EVENT (OUTPATIENT)
Dept: PERIOP | Facility: HOSPITAL | Age: 77
End: 2017-11-16

## 2017-11-16 ENCOUNTER — HOSPITAL ENCOUNTER (INPATIENT)
Facility: HOSPITAL | Age: 77
LOS: 1 days | Discharge: HOME | End: 2017-11-17
Attending: ORTHOPAEDIC SURGERY | Admitting: ORTHOPAEDIC SURGERY

## 2017-11-16 ENCOUNTER — APPOINTMENT (OUTPATIENT)
Dept: GENERAL RADIOLOGY | Facility: HOSPITAL | Age: 77
End: 2017-11-16

## 2017-11-16 ENCOUNTER — ANESTHESIA (OUTPATIENT)
Dept: PERIOP | Facility: HOSPITAL | Age: 77
End: 2017-11-16

## 2017-11-16 DIAGNOSIS — M12.811 ROTATOR CUFF TEAR ARTHROPATHY, RIGHT: ICD-10-CM

## 2017-11-16 DIAGNOSIS — M75.101 ROTATOR CUFF TEAR ARTHROPATHY, RIGHT: ICD-10-CM

## 2017-11-16 PROBLEM — M12.819 ROTATOR CUFF ARTHROPATHY: Status: ACTIVE | Noted: 2017-11-16

## 2017-11-16 PROCEDURE — 63710000001 DIPHENHYDRAMINE PER 50 MG: Performed by: ORTHOPAEDIC SURGERY

## 2017-11-16 PROCEDURE — 25010000002 NEOSTIGMINE PER 0.5 MG: Performed by: NURSE ANESTHETIST, CERTIFIED REGISTERED

## 2017-11-16 PROCEDURE — 94799 UNLISTED PULMONARY SVC/PX: CPT

## 2017-11-16 PROCEDURE — 25010000002 MIDAZOLAM PER 1 MG: Performed by: ANESTHESIOLOGY

## 2017-11-16 PROCEDURE — 25010000002 PROPOFOL 10 MG/ML EMULSION: Performed by: NURSE ANESTHETIST, CERTIFIED REGISTERED

## 2017-11-16 PROCEDURE — 25010000002 ONDANSETRON PER 1 MG: Performed by: NURSE ANESTHETIST, CERTIFIED REGISTERED

## 2017-11-16 PROCEDURE — 23472 RECONSTRUCT SHOULDER JOINT: CPT | Performed by: ORTHOPAEDIC SURGERY

## 2017-11-16 PROCEDURE — 25010000002 FENTANYL CITRATE (PF) 100 MCG/2ML SOLUTION: Performed by: ANESTHESIOLOGY

## 2017-11-16 PROCEDURE — C1776 JOINT DEVICE (IMPLANTABLE): HCPCS | Performed by: ORTHOPAEDIC SURGERY

## 2017-11-16 PROCEDURE — 25010000002 VANCOMYCIN 10 G RECONSTITUTED SOLUTION: Performed by: ORTHOPAEDIC SURGERY

## 2017-11-16 PROCEDURE — 73020 X-RAY EXAM OF SHOULDER: CPT

## 2017-11-16 PROCEDURE — 0RRJ00Z REPLACEMENT OF RIGHT SHOULDER JOINT WITH REVERSE BALL AND SOCKET SYNTHETIC SUBSTITUTE, OPEN APPROACH: ICD-10-PCS | Performed by: ORTHOPAEDIC SURGERY

## 2017-11-16 PROCEDURE — 25010000002 FENTANYL CITRATE (PF) 100 MCG/2ML SOLUTION: Performed by: NURSE ANESTHETIST, CERTIFIED REGISTERED

## 2017-11-16 PROCEDURE — 25010000003 CEFAZOLIN IN DEXTROSE 2-4 GM/100ML-% SOLUTION: Performed by: ORTHOPAEDIC SURGERY

## 2017-11-16 PROCEDURE — 25010000003 CEFAZOLIN PER 500 MG: Performed by: ORTHOPAEDIC SURGERY

## 2017-11-16 DEVICE — TRY HUM STD COBLT 44MM: Type: IMPLANTABLE DEVICE | Site: SHOULDER | Status: FUNCTIONAL

## 2017-11-16 DEVICE — BEAR HUM COMPRNSV ARCOMXL PLS3 44 36: Type: IMPLANTABLE DEVICE | Site: SHOULDER | Status: FUNCTIONAL

## 2017-11-16 DEVICE — GLENOSPHERE VERSA DIAL FIX STD 36MM: Type: IMPLANTABLE DEVICE | Site: SHOULDER | Status: FUNCTIONAL

## 2017-11-16 DEVICE — SCRW FIX LK HEX 4.75X20MM: Type: IMPLANTABLE DEVICE | Site: SHOULDER | Status: FUNCTIONAL

## 2017-11-16 DEVICE — SCRW FIX LK HEX 4.75X15MM: Type: IMPLANTABLE DEVICE | Site: SHOULDER | Status: FUNCTIONAL

## 2017-11-16 DEVICE — BASEPLT GLEN COMPR MINI W TPR ADAPTR 25: Type: IMPLANTABLE DEVICE | Site: SHOULDER | Status: FUNCTIONAL

## 2017-11-16 DEVICE — SCRW COMPRNSV CNTRL 6.5X30MM REUS: Type: IMPLANTABLE DEVICE | Site: SHOULDER | Status: FUNCTIONAL

## 2017-11-16 DEVICE — IMPLANTABLE DEVICE: Type: IMPLANTABLE DEVICE | Site: SHOULDER | Status: FUNCTIONAL

## 2017-11-16 DEVICE — STEM HUM/SHLDR COMPREHENSIVE MINI 11X83MM: Type: IMPLANTABLE DEVICE | Site: SHOULDER | Status: FUNCTIONAL

## 2017-11-16 RX ORDER — MAGNESIUM HYDROXIDE 1200 MG/15ML
LIQUID ORAL AS NEEDED
Status: DISCONTINUED | OUTPATIENT
Start: 2017-11-16 | End: 2017-11-16 | Stop reason: HOSPADM

## 2017-11-16 RX ORDER — EPHEDRINE SULFATE 50 MG/ML
INJECTION, SOLUTION INTRAVENOUS AS NEEDED
Status: DISCONTINUED | OUTPATIENT
Start: 2017-11-16 | End: 2017-11-16 | Stop reason: SURG

## 2017-11-16 RX ORDER — DOCUSATE SODIUM 100 MG/1
100 CAPSULE, LIQUID FILLED ORAL 2 TIMES DAILY PRN
Status: DISCONTINUED | OUTPATIENT
Start: 2017-11-16 | End: 2017-11-17 | Stop reason: HOSPADM

## 2017-11-16 RX ORDER — SODIUM CHLORIDE 9 MG/ML
100 INJECTION, SOLUTION INTRAVENOUS CONTINUOUS
Status: DISCONTINUED | OUTPATIENT
Start: 2017-11-16 | End: 2017-11-17 | Stop reason: HOSPADM

## 2017-11-16 RX ORDER — OXYCODONE AND ACETAMINOPHEN 7.5; 325 MG/1; MG/1
2 TABLET ORAL EVERY 4 HOURS PRN
Status: DISCONTINUED | OUTPATIENT
Start: 2017-11-16 | End: 2017-11-17 | Stop reason: HOSPADM

## 2017-11-16 RX ORDER — DIPHENHYDRAMINE HYDROCHLORIDE 50 MG/ML
25 INJECTION INTRAMUSCULAR; INTRAVENOUS EVERY 6 HOURS PRN
Status: DISCONTINUED | OUTPATIENT
Start: 2017-11-16 | End: 2017-11-17 | Stop reason: HOSPADM

## 2017-11-16 RX ORDER — ACETAMINOPHEN 325 MG/1
325 TABLET ORAL EVERY 4 HOURS PRN
Status: DISCONTINUED | OUTPATIENT
Start: 2017-11-16 | End: 2017-11-17 | Stop reason: HOSPADM

## 2017-11-16 RX ORDER — CEFAZOLIN SODIUM 2 G/100ML
2 INJECTION, SOLUTION INTRAVENOUS ONCE
Status: COMPLETED | OUTPATIENT
Start: 2017-11-16 | End: 2017-11-16

## 2017-11-16 RX ORDER — ALBUTEROL SULFATE 2.5 MG/3ML
2.5 SOLUTION RESPIRATORY (INHALATION) ONCE AS NEEDED
Status: DISCONTINUED | OUTPATIENT
Start: 2017-11-16 | End: 2017-11-16 | Stop reason: HOSPADM

## 2017-11-16 RX ORDER — HYDRALAZINE HYDROCHLORIDE 20 MG/ML
5 INJECTION INTRAMUSCULAR; INTRAVENOUS
Status: DISCONTINUED | OUTPATIENT
Start: 2017-11-16 | End: 2017-11-16 | Stop reason: HOSPADM

## 2017-11-16 RX ORDER — ACETAMINOPHEN 325 MG/1
650 TABLET ORAL ONCE AS NEEDED
Status: DISCONTINUED | OUTPATIENT
Start: 2017-11-16 | End: 2017-11-16 | Stop reason: HOSPADM

## 2017-11-16 RX ORDER — HYDROCODONE BITARTRATE AND ACETAMINOPHEN 5; 325 MG/1; MG/1
1 TABLET ORAL ONCE AS NEEDED
Status: DISCONTINUED | OUTPATIENT
Start: 2017-11-16 | End: 2017-11-16 | Stop reason: HOSPADM

## 2017-11-16 RX ORDER — ACETAMINOPHEN 325 MG/1
650 TABLET ORAL EVERY 4 HOURS PRN
Status: DISCONTINUED | OUTPATIENT
Start: 2017-11-16 | End: 2017-11-17 | Stop reason: HOSPADM

## 2017-11-16 RX ORDER — PANTOPRAZOLE SODIUM 40 MG/1
40 TABLET, DELAYED RELEASE ORAL
Status: DISCONTINUED | OUTPATIENT
Start: 2017-11-17 | End: 2017-11-17 | Stop reason: HOSPADM

## 2017-11-16 RX ORDER — PROPOFOL 10 MG/ML
VIAL (ML) INTRAVENOUS AS NEEDED
Status: DISCONTINUED | OUTPATIENT
Start: 2017-11-16 | End: 2017-11-16 | Stop reason: SURG

## 2017-11-16 RX ORDER — BISACODYL 10 MG
10 SUPPOSITORY, RECTAL RECTAL DAILY PRN
Status: DISCONTINUED | OUTPATIENT
Start: 2017-11-16 | End: 2017-11-17 | Stop reason: HOSPADM

## 2017-11-16 RX ORDER — MIDAZOLAM HYDROCHLORIDE 1 MG/ML
1 INJECTION INTRAMUSCULAR; INTRAVENOUS
Status: DISCONTINUED | OUTPATIENT
Start: 2017-11-16 | End: 2017-11-16 | Stop reason: HOSPADM

## 2017-11-16 RX ORDER — FENTANYL CITRATE 50 UG/ML
50 INJECTION, SOLUTION INTRAMUSCULAR; INTRAVENOUS
Status: DISCONTINUED | OUTPATIENT
Start: 2017-11-16 | End: 2017-11-16 | Stop reason: HOSPADM

## 2017-11-16 RX ORDER — ONDANSETRON 2 MG/ML
INJECTION INTRAMUSCULAR; INTRAVENOUS AS NEEDED
Status: DISCONTINUED | OUTPATIENT
Start: 2017-11-16 | End: 2017-11-16 | Stop reason: SURG

## 2017-11-16 RX ORDER — FUROSEMIDE 20 MG/1
20 TABLET ORAL 2 TIMES DAILY
Status: DISCONTINUED | OUTPATIENT
Start: 2017-11-16 | End: 2017-11-17 | Stop reason: HOSPADM

## 2017-11-16 RX ORDER — FENTANYL CITRATE 50 UG/ML
INJECTION, SOLUTION INTRAMUSCULAR; INTRAVENOUS AS NEEDED
Status: DISCONTINUED | OUTPATIENT
Start: 2017-11-16 | End: 2017-11-16 | Stop reason: SURG

## 2017-11-16 RX ORDER — NALOXONE HCL 0.4 MG/ML
0.1 VIAL (ML) INJECTION
Status: DISCONTINUED | OUTPATIENT
Start: 2017-11-16 | End: 2017-11-17 | Stop reason: HOSPADM

## 2017-11-16 RX ORDER — SODIUM CHLORIDE 0.9 % (FLUSH) 0.9 %
1-10 SYRINGE (ML) INJECTION AS NEEDED
Status: DISCONTINUED | OUTPATIENT
Start: 2017-11-16 | End: 2017-11-16 | Stop reason: HOSPADM

## 2017-11-16 RX ORDER — NALOXONE HCL 0.4 MG/ML
0.2 VIAL (ML) INJECTION AS NEEDED
Status: DISCONTINUED | OUTPATIENT
Start: 2017-11-16 | End: 2017-11-16 | Stop reason: HOSPADM

## 2017-11-16 RX ORDER — LIDOCAINE HYDROCHLORIDE 20 MG/ML
INJECTION, SOLUTION INFILTRATION; PERINEURAL AS NEEDED
Status: DISCONTINUED | OUTPATIENT
Start: 2017-11-16 | End: 2017-11-16 | Stop reason: SURG

## 2017-11-16 RX ORDER — LOSARTAN POTASSIUM 50 MG/1
50 TABLET ORAL DAILY
Status: DISCONTINUED | OUTPATIENT
Start: 2017-11-17 | End: 2017-11-17 | Stop reason: HOSPADM

## 2017-11-16 RX ORDER — ROCURONIUM BROMIDE 10 MG/ML
INJECTION, SOLUTION INTRAVENOUS AS NEEDED
Status: DISCONTINUED | OUTPATIENT
Start: 2017-11-16 | End: 2017-11-16 | Stop reason: SURG

## 2017-11-16 RX ORDER — TRANEXAMIC ACID 100 MG/ML
INJECTION, SOLUTION INTRAVENOUS AS NEEDED
Status: DISCONTINUED | OUTPATIENT
Start: 2017-11-16 | End: 2017-11-16 | Stop reason: SURG

## 2017-11-16 RX ORDER — FAMOTIDINE 10 MG/ML
20 INJECTION, SOLUTION INTRAVENOUS ONCE
Status: COMPLETED | OUTPATIENT
Start: 2017-11-16 | End: 2017-11-16

## 2017-11-16 RX ORDER — ONDANSETRON 2 MG/ML
4 INJECTION INTRAMUSCULAR; INTRAVENOUS ONCE AS NEEDED
Status: DISCONTINUED | OUTPATIENT
Start: 2017-11-16 | End: 2017-11-16 | Stop reason: HOSPADM

## 2017-11-16 RX ORDER — CARVEDILOL 12.5 MG/1
12.5 TABLET ORAL 2 TIMES DAILY
Status: DISCONTINUED | OUTPATIENT
Start: 2017-11-16 | End: 2017-11-17 | Stop reason: HOSPADM

## 2017-11-16 RX ORDER — FERROUS SULFATE 325(65) MG
325 TABLET ORAL
Status: DISCONTINUED | OUTPATIENT
Start: 2017-11-16 | End: 2017-11-17 | Stop reason: HOSPADM

## 2017-11-16 RX ORDER — MIDAZOLAM HYDROCHLORIDE 1 MG/ML
2 INJECTION INTRAMUSCULAR; INTRAVENOUS
Status: DISCONTINUED | OUTPATIENT
Start: 2017-11-16 | End: 2017-11-16 | Stop reason: HOSPADM

## 2017-11-16 RX ORDER — FENTANYL CITRATE 50 UG/ML
25 INJECTION, SOLUTION INTRAMUSCULAR; INTRAVENOUS
Status: DISCONTINUED | OUTPATIENT
Start: 2017-11-16 | End: 2017-11-16 | Stop reason: HOSPADM

## 2017-11-16 RX ORDER — LABETALOL HYDROCHLORIDE 5 MG/ML
5 INJECTION, SOLUTION INTRAVENOUS
Status: DISCONTINUED | OUTPATIENT
Start: 2017-11-16 | End: 2017-11-16 | Stop reason: HOSPADM

## 2017-11-16 RX ORDER — DIPHENHYDRAMINE HCL 25 MG
25 CAPSULE ORAL EVERY 6 HOURS PRN
Status: DISCONTINUED | OUTPATIENT
Start: 2017-11-16 | End: 2017-11-17 | Stop reason: HOSPADM

## 2017-11-16 RX ORDER — SODIUM CHLORIDE, SODIUM LACTATE, POTASSIUM CHLORIDE, CALCIUM CHLORIDE 600; 310; 30; 20 MG/100ML; MG/100ML; MG/100ML; MG/100ML
9 INJECTION, SOLUTION INTRAVENOUS CONTINUOUS
Status: DISCONTINUED | OUTPATIENT
Start: 2017-11-16 | End: 2017-11-16 | Stop reason: HOSPADM

## 2017-11-16 RX ORDER — GLYCOPYRROLATE 0.2 MG/ML
INJECTION INTRAMUSCULAR; INTRAVENOUS AS NEEDED
Status: DISCONTINUED | OUTPATIENT
Start: 2017-11-16 | End: 2017-11-16 | Stop reason: SURG

## 2017-11-16 RX ORDER — FLUMAZENIL 0.1 MG/ML
0.2 INJECTION INTRAVENOUS AS NEEDED
Status: DISCONTINUED | OUTPATIENT
Start: 2017-11-16 | End: 2017-11-16 | Stop reason: HOSPADM

## 2017-11-16 RX ADMIN — NEOSTIGMINE METHYLSULFATE 3 MG: 1 INJECTION INTRAMUSCULAR; INTRAVENOUS; SUBCUTANEOUS at 09:51

## 2017-11-16 RX ADMIN — LIDOCAINE HYDROCHLORIDE 60 MG: 20 INJECTION, SOLUTION INFILTRATION; PERINEURAL at 08:39

## 2017-11-16 RX ADMIN — OXYCODONE HYDROCHLORIDE AND ACETAMINOPHEN 2 TABLET: 7.5; 325 TABLET ORAL at 13:31

## 2017-11-16 RX ADMIN — PANCRELIPASE 24000 UNITS OF LIPASE: 60000; 12000; 38000 CAPSULE, DELAYED RELEASE PELLETS ORAL at 13:31

## 2017-11-16 RX ADMIN — SODIUM CHLORIDE, POTASSIUM CHLORIDE, SODIUM LACTATE AND CALCIUM CHLORIDE 9 ML/HR: 600; 310; 30; 20 INJECTION, SOLUTION INTRAVENOUS at 08:11

## 2017-11-16 RX ADMIN — GLYCOPYRROLATE 0.6 MG: 0.2 INJECTION INTRAMUSCULAR; INTRAVENOUS at 09:51

## 2017-11-16 RX ADMIN — VANCOMYCIN HYDROCHLORIDE 750 MG: 10 INJECTION, POWDER, LYOPHILIZED, FOR SOLUTION INTRAVENOUS at 07:26

## 2017-11-16 RX ADMIN — CARVEDILOL 12.5 MG: 12.5 TABLET, FILM COATED ORAL at 17:53

## 2017-11-16 RX ADMIN — DIPHENHYDRAMINE HYDROCHLORIDE 25 MG: 25 CAPSULE ORAL at 23:44

## 2017-11-16 RX ADMIN — CEFAZOLIN SODIUM 2 G: 2 INJECTION, SOLUTION INTRAVENOUS at 08:55

## 2017-11-16 RX ADMIN — FAMOTIDINE 20 MG: 10 INJECTION INTRAVENOUS at 08:11

## 2017-11-16 RX ADMIN — VANCOMYCIN HYDROCHLORIDE 750 MG: 10 INJECTION, POWDER, LYOPHILIZED, FOR SOLUTION INTRAVENOUS at 20:15

## 2017-11-16 RX ADMIN — EPHEDRINE SULFATE 10 MG: 50 INJECTION INTRAMUSCULAR; INTRAVENOUS; SUBCUTANEOUS at 09:25

## 2017-11-16 RX ADMIN — ROCURONIUM BROMIDE 20 MG: 10 INJECTION INTRAVENOUS at 08:39

## 2017-11-16 RX ADMIN — MIDAZOLAM HYDROCHLORIDE 1 MG: 1 INJECTION, SOLUTION INTRAMUSCULAR; INTRAVENOUS at 07:56

## 2017-11-16 RX ADMIN — ONDANSETRON 4 MG: 2 INJECTION INTRAMUSCULAR; INTRAVENOUS at 09:45

## 2017-11-16 RX ADMIN — ROCURONIUM BROMIDE 10 MG: 10 INJECTION INTRAVENOUS at 09:10

## 2017-11-16 RX ADMIN — PANCRELIPASE 24000 UNITS OF LIPASE: 60000; 12000; 38000 CAPSULE, DELAYED RELEASE PELLETS ORAL at 17:53

## 2017-11-16 RX ADMIN — PROPOFOL 100 MG: 10 INJECTION, EMULSION INTRAVENOUS at 08:39

## 2017-11-16 RX ADMIN — EPHEDRINE SULFATE 10 MG: 50 INJECTION INTRAMUSCULAR; INTRAVENOUS; SUBCUTANEOUS at 08:58

## 2017-11-16 RX ADMIN — CEFAZOLIN SODIUM 2 G: 1 INJECTION, POWDER, FOR SOLUTION INTRAMUSCULAR; INTRAVENOUS at 17:53

## 2017-11-16 RX ADMIN — FENTANYL CITRATE 50 MCG: 50 INJECTION, SOLUTION INTRAMUSCULAR; INTRAVENOUS at 07:59

## 2017-11-16 RX ADMIN — TRANEXAMIC ACID 1000 MG: 100 INJECTION, SOLUTION INTRAVENOUS at 09:32

## 2017-11-16 RX ADMIN — FUROSEMIDE 20 MG: 20 TABLET ORAL at 13:31

## 2017-11-16 RX ADMIN — FERROUS SULFATE TAB 325 MG (65 MG ELEMENTAL FE) 325 MG: 325 (65 FE) TAB at 13:31

## 2017-11-16 RX ADMIN — FENTANYL CITRATE 25 MCG: 50 INJECTION, SOLUTION INTRAMUSCULAR; INTRAVENOUS at 10:05

## 2017-11-16 RX ADMIN — OXYCODONE HYDROCHLORIDE AND ACETAMINOPHEN 2 TABLET: 7.5; 325 TABLET ORAL at 21:18

## 2017-11-16 NOTE — H&P (VIEW-ONLY)
History & Physical         Patient: Bhumi Gimenez    YOB: 1940    Medical Record Number: [id number]    Chief Complaints:   Chief Complaint   Patient presents with   • Right Shoulder - Pre-op Exam       History of Present Illness: 77 y.o. female presents with   Chief Complaint   Patient presents with   • Right Shoulder - Pre-op Exam   Reports a long history of progressively worsening pain, motion loss, and dysfunction.  Describes current pain as severe.  Has failed prolonged conservative treatment.  Denies any new complaints or issues.    Allergies: No Known Allergies    Medications:   Home Medications:    Current Outpatient Prescriptions:   •  aspirin 81 MG EC tablet, Take 81 mg by mouth Daily. HOLD PRIOR TO SURGERY, Disp: , Rfl:   •  carvedilol (COREG) 12.5 MG tablet, Take 1 tablet by mouth 2 (two) times a day., Disp: 180 tablet, Rfl: 3  •  ferrous sulfate 325 (65 FE) MG EC tablet, Take 325 mg by mouth Daily With Breakfast., Disp: , Rfl:   •  furosemide (LASIX) 20 MG tablet, Take 1 tablet by mouth 2 (two) times a day., Disp: 180 tablet, Rfl: 3  •  losartan (COZAAR) 50 MG tablet, Take 1 tablet by mouth daily., Disp: 90 tablet, Rfl: 3  •  pancrelipase, Lip-Prot-Amyl, (CREON) 80944 UNITS capsule delayed-release particles capsule, Take 2 capsules by mouth 3 (Three) Times a Day With Meals., Disp: 540 capsule, Rfl: 1  •  pantoprazole (PROTONIX) 40 MG EC tablet, Take 1 tablet by mouth daily., Disp: 90 tablet, Rfl: 3  •  potassium chloride ER (K-TAB) 20 MEQ tablet controlled-release ER tablet, Take 1 tablet by mouth Daily., Disp: 90 tablet, Rfl: 1  •  Probiotic capsule, Take 1 tablet by mouth Daily. HOLD PRIOR TO SURGERY, Disp: , Rfl:     Past Medical History:   Diagnosis Date   • Anemia of chronic disorder    • Aortic sclerosis    • Cancer     SKIN CANCER REMOVED FROM LEFT KNEE   • Chronic deep venous thrombosis     chronic bilateral DVT   • Chronic kidney disease    • Chronic pancreatitis     ACUTE  PANCREATITIS 11/20/2013   • Diverticulosis    • Esophageal reflux    • Full dentures    • Hepatomegaly     LIKELY SECONDARY TO ALCOHOL USE   • History of alcohol use     quit 11/13   • History of transfusion    • Hypertension    • Left foot drop     LEFT FOOD DROP SECONDARY TO PERONEAL NERVE INJURY   • Osteoarthritis     OSTEO   • PAF (paroxysmal atrial fibrillation)    • Peripheral neuropathy     LEFT LOWER EXTREMITY   • Personal history of gallstones    • Personal history of malignant neoplasm of skin     S/P REMOVAL FROM LEFT KNEE   • Takotsubo syndrome     Likely secondary to acute illness in 11/13.  EF as low as 20-25% 11/27/13.  EF 63% by echo 4/14/14.   • Thrombocytopenia           Past Surgical History:   Procedure Laterality Date   • CHOLECYSTECTOMY     • CORNEAL TRANSPLANT     • MULTIPLE TOOTH EXTRACTIONS      FULL MOUTH TEETH REMOVED          Social History     Occupational History   • Not on file.     Social History Main Topics   • Smoking status: Never Smoker   • Smokeless tobacco: Never Used   • Alcohol use No   • Drug use: No   • Sexual activity: Defer      Social History     Social History Narrative          Family History   Problem Relation Age of Onset   • Alcohol abuse Mother    • Other Mother      CORONARY ARTERIOSCLEROSIS   • Cancer Father      prostate   • Malig Hyperthermia Neg Hx        Review of Systems:     Constitutional:  Denies fever, shaking or chills   Eyes:  Denies change in visual acuity   HEENT:  Denies nasal congestion or sore throat   Respiratory:  Denies cough or shortness of breath   Cardiovascular:  Denies chest pain or edema   GI:  Denies abdominal pain, nausea, vomiting, bloody stools or diarrhea   Musculoskeletal:  Denies numbness tingling or loss of motor function except as outlined above in history of present illness.  Integument:  Denies rash, lesion or ulceration   Neurologic:  Denies headache or focal weakness, deficits      All other pertinent positives and negatives  as noted above in HPI.    Physical Exam: 77 y.o. female  General:  Patient is awake and alert.  Appears in no acute distress or discomfort.    Psych:  Affect and demeanor are appropriate.    Eyes:  Conjunctiva and sclera appear grossly normal.  Eyes track well and EOM seem to be intact.    Ears:  No gross abnormalities.  Hearing adequate for the exam.    Cardiovascular:  Regular rate and rhythm.    Lungs:  Good chest expansion.  Breathing unlabored.    Lymph:  No palpable adenopathy about neck or axilla.    Neck:  Supple.  Normal ROM.  Negative Spurling's for shoulder or arm pain.    Right upper extremity:  Skin benign and intact without evidence for swelling, masses or atrophy.  No palpable masses.  No focal areas of exquisite tenderness.  Shoulder ROM limited and uncomfortable--for elevation is approximately 150°, external rotation 30-35°, internal rotation to back pocket.  No evident instability or apprehension.  Hornblowers negative.  ER lag sign negative.  Good strength in deltoid, wrist and hand.  Intact sensation in arm, hand.  Palpable radial pulse with brisk cap refill.    Diagnostic Tests:  Lab Results   Component Value Date    GLUCOSE 91 11/08/2017    CALCIUM 9.1 11/08/2017     (L) 11/08/2017    K 3.9 11/08/2017    CO2 21.8 (L) 11/08/2017    CL 99 11/08/2017    BUN 16 11/08/2017    CREATININE 0.73 11/08/2017    EGFRIFAFRI 50 (L) 04/28/2017    EGFRIFNONA 77 11/08/2017    BCR 21.9 11/08/2017    ANIONGAP 13.2 11/08/2017     Lab Results   Component Value Date    WBC 6.70 11/08/2017    HGB 11.0 (L) 11/08/2017    HCT 33.3 (L) 11/08/2017    MCV 94.3 11/08/2017     11/08/2017     No results found for: INR, PROTIME     Imaging: Previous x-rays and MRI of the right shoulder are again reviewed.  The x-rays shows no profound abnormalities.  The MRI does show a large, retracted rotator cuff tear with significant atrophy and associated rotator cuff tear arthropathy.    Assessment:  Right shoulder rotator  cuff tear arthropathy    Plan: We had a thorough discussion regarding the risks, benefits and alternatives to an arthroplasty and non-surgical management versus surgery.  I explained that surgical risks include infection, hematoma, hardware related complications including failure of fixation, loosening, fracture, persistent pain and/or loss of motion, iatrogenic nerve and/or blood vessel injury resulting in permanent weakness, numbness or dysfunction, DVT, PE, positioning related neuropraxia, and anesthesia related complications resulting in death.  We discussed the indication for a reverse as opposed to a standard total shoulder and the risks inherent to the reverse including notching, glenoid loosening, instability, and traction related neuropraxia, any of which could result in persistent pain or problems requiring further surgery.  Lastly, we discussed the rehab and all that will be expected of the patient post operatively to ensure an optimal outcome.  The patient voiced understanding of the risks, benefits, and alternative forms of treatment that were discussed and the patient consents to proceed with the reverse arthroplasty.        Date: 11/13/2017    Sabino Harley MD

## 2017-11-16 NOTE — PLAN OF CARE
Problem: Patient Care Overview (Adult)  Goal: Plan of Care Review  Outcome: Ongoing (interventions implemented as appropriate)    11/16/17 5813   Coping/Psychosocial Response Interventions   Plan Of Care Reviewed With patient   Patient Care Overview   Progress improving   Outcome Evaluation   Outcome Summary/Follow up Plan PAIN CONTROLLED, ADEQUATE PO INTAKE, DTV 1800, NVI, DSG CDI, VSS- NO HTN NOTED       Goal: Adult Individualization and Mutuality  Outcome: Ongoing (interventions implemented as appropriate)  Goal: Discharge Needs Assessment  Outcome: Ongoing (interventions implemented as appropriate)    Problem: Self-Care Deficit (Adult,Obstetrics,Pediatric)  Goal: Improved Ability to Perform BADL and IADL  Outcome: Ongoing (interventions implemented as appropriate)    Problem: Fall Risk (Adult)  Goal: Absence of Falls  Outcome: Ongoing (interventions implemented as appropriate)    Problem: Pain, Acute (Adult)  Goal: Acceptable Pain Control/Comfort Level  Outcome: Ongoing (interventions implemented as appropriate)

## 2017-11-16 NOTE — PLAN OF CARE
Problem: Self-Care Deficit (Adult,Obstetrics,Pediatric)  Goal: Identify Related Risk Factors and Signs and Symptoms  Outcome: Outcome(s) achieved Date Met:  11/16/17 11/16/17 1215   Self-Care Deficit   Self-Care Deficit: Related Risk Factors surgery         Problem: Fall Risk (Adult)  Goal: Identify Related Risk Factors and Signs and Symptoms  Outcome: Outcome(s) achieved Date Met:  11/16/17 11/16/17 1215   Fall Risk   Fall Risk: Related Risk Factors gait/mobility problems;environment unfamiliar         Problem: Pain, Acute (Adult)  Goal: Identify Related Risk Factors and Signs and Symptoms  Outcome: Outcome(s) achieved Date Met:  11/16/17 11/16/17 1215   Pain, Acute   Related Risk Factors (Acute Pain) surgery

## 2017-11-16 NOTE — OP NOTE
Orthopaedic Operative Note    Facility: [facility]    Patient: Bhumi Gimenez    Medical Record Number: [id number]    YOB: 1940    Dictating Surgeon: Sabino Harley M.D.    Primary Care Physician: Niko Cheatham MD    Date of Operation:  11/16/2017    PREOPERATIVE DIAGNOSIS:  Right shoulder rotator cuff tear arthropathy         POSTOPERATIVE DIAGNOSIS: Right shoulder rotator cuff tear arthropathy         PROCEDURES PERFORMED:   Right reverse total shoulder arthroplasty           SURGEON: Sabino Harley MD      ASSISTANT: Luis Meade (First Assist)      ANESTHESIA:    Regional followed by General.          COMPLICATIONS: None.       SPECIMEN: None.       ESTIMATED BLOOD LOSS: Less than 100 mL.       IMPLANTS:    1. Biomet 11 mm mini comprehensive humeral stem with 44 standard tray and 36 plus 3 mm   humeral bearing.    2. Size 25 mm mini glenoid baseplate with one 6.5 mm central compression screw   and 4 peripheral 4.75 mm locking screws.    3. Size 36 mm Glenosphere.      INDICATIONS: The patient has a long history of shoulder pain and weakness   which has been nonresponse to conservative treatment.  I explained that surgical risks include infection, hematoma, hardware related complications including failure of fixation, loosening, fracture, persistent pain and/or loss of motion, iatrogenic nerve and/or blood vessel injury resulting in permanent weakness, numbness or dysfunction, DVT, PE, positioning related neuropraxia, and anesthesia related complications resulting in death.  We discussed the indication for a reverse as opposed to a standard total shoulder and the risks inherent to the reverse including notching, glenoid loosening, instability, and traction related neuropraxia, any of which could result in persistent pain or problems requiring further surgery.                        DESCRIPTION OF PROCEDURE: The patient and operative site were identified in the    preoperative holding area. The surgical site was marked. Following this, adequate   regional anesthesia of the right upper extremity was administered. The patient   was then taken to the operating room and placed in the supine position.   Adequate general anesthesia was administered and then the patient was repositioned on   the operating table in the modified beach chair position. The head and neck were   placed in neutral alignment and all bony prominences were carefully padded and   protected. Once we had appropriate position, a timeout was taken, preoperative   antibiotics were administered. The extremity was cleaned with an alcohol   solution. A Hibiclens scrub was performed and then the extremity was prepped   with 2 ChloraPreps. I allowed this to dry for 3 minutes before the draping   procedure was carried out. Again, a timeout had been taken and preoperative   antibiotics administered prior to incision.     I fashioned an approximately 6 cm incision over the anterior aspect of the shoulder,   carried that down through the skin and subcutaneous tissues. Full-thickness medial and   lateral skin flaps were developed. The cephalic vein was dissected out and this structure   was retracted laterally. This was kept protected throughout the duration of the   case. The underlying clavipectoral fascia was divided. The deltoid muscle was   retracted laterally and the strap muscles retracted medially. The long head of   was already torn and retracted.  This structure was not visualized during the case.                The patient had a large, retracted rotator cuff tear involving the supraspinatus and infraspinatus along with the upper subscapularis.  There was also significant arthrosis of the visible   portions of the humeral head.       The remaining subscapularis was released off the attachment to   the lesser tuberosity and the humeral head exposed. The periarticular   osteophytes were carefully removed with a  jennifer. I then inserted the cutting   guide. This was pinned into position, set to 20 degrees of retroversion as referenced   off the forearm. The head cut was carried out in typical fashion and the cut   portion of bone removed.     Once I completed that process, I directed my attention   to the glenoid. The axillary nerve was dissected out and this structure was   identified and protected. Once I had that structure protected, the anterior,   posterior, and inferior glenoid retractors were carefully positioned. Superior,   anterior, and inferior glenoid labral tissue was carefully removed to allow for   exposure of the caudal rim of the glenoid to ensure correct positioning of the   baseplate.     The centering guide for the baseplate was inserted, the center pin   was drilled. The glenoid was then reamed and prepared in typical fashion,   careful to maintain appropriate inferior tilt of the component. With the   glenoid sided preparations completed, the baseplate was impacted into position.   It seated well. It was secured with a single central compression screw along   with the 4 peripheral locking screws. The compression screw got great purchase   and the 4 locking screws were confirmed to lock very nicely into the plate. The   baseplate seemed to be well fixed.     I trialed with a 36 Glenosphere. It fit well.   I did have to adjust the offset to allow for adequate   inferior overhang to hopefully minimize the risk of notching. The trial   component was removed, final component impacted into position. It seated well.   I confirmed that was well seated by pulling up on it circumferentially with a   right angle clamp. When doing so, the entire scapula seemed to move. With the   baseplate and glenoid well seated, I then directed my attention to the humerus.     The humerus was reamed and broached up to an 11 which fit well. The trial   component was removed, final component impacted into position, taking care to    maintain appropriate retroversion as referenced off the forearm. I trialed off   of the stem then placed the final implants. The 36 plus 3 mm fit the best and restored excellent    motion and stability. There was no impingement and I did check the stability in multiple   positions of internal and external rotation with axial loading.  Once again,    the components were confirmed to be stable, and again, the shoulder   demonstrated excellent motion.       The wound was irrigated out with 500 ccs of a betadine-containing   saline solution. I then irrigated with 3 L of sterile saline mixed with bacitracin via    pulsatile lavage. I made sure that we had good hemostasis. The wound was then   sequentially closed in layers using interrupted Vicryl for the deep tissues and a running   subcuticular Monocryl stitch for the skin followed by Dermabond. Sterile   dressings were applied to the wound and the drapes withdrawn. The arm was   placed in a shoulder immobilizer. The patient was awakened, transferred to a   standard bed, and taken to the recovery room. The patient tolerated the procedure well.   There were no complications. The patient was taken to the recovery room in good   condition.          Sabino Harley M.D.*   11/16/2017     cc: Niko Cheatham MD

## 2017-11-16 NOTE — PLAN OF CARE
Problem: Patient Care Overview (Adult)  Goal: Plan of Care Review  Outcome: Ongoing (interventions implemented as appropriate)    11/16/17 0728   Coping/Psychosocial Response Interventions   Plan Of Care Reviewed With patient   Patient Care Overview   Progress no change       Goal: Adult Individualization and Mutuality  Outcome: Ongoing (interventions implemented as appropriate)    11/16/17 0728   Individualization   Patient Specific Preferences Call pt Svetlana   Patient Specific Goals No infection after surgery.   Patient Specific Interventions Teaach good hand hygiene.       Goal: Discharge Needs Assessment  Outcome: Ongoing (interventions implemented as appropriate)    11/16/17 0728   Discharge Needs Assessment   Concerns To Be Addressed denies needs/concerns at this time   Current Health   Anticipated Changes Related to Illness none   Self-Care   Equipment Currently Used at Home none         Problem: Perioperative Period (Adult)  Goal: Signs and Symptoms of Listed Potential Problems Will be Absent or Manageable (Perioperative Period)  Outcome: Ongoing (interventions implemented as appropriate)    11/16/17 0728   Perioperative Period   Problems Present (Perioperative Period) none

## 2017-11-16 NOTE — ANESTHESIA PROCEDURE NOTES
Airway  Urgency: elective    Airway not difficult    General Information and Staff    Patient location during procedure: OR  Anesthesiologist: NICK CHANCE  CRNA: MABEL WEBB    Indications and Patient Condition  Indications for airway management: airway protection    Preoxygenated: yes (pt pre-O2 with 100% O2)  MILS not maintained throughout  Mask difficulty assessment: 2 - vent by mask + OA or adjuvant +/- NMBA (easy BMV )    Final Airway Details  Final airway type: endotracheal airway      Successful airway: ETT  Cuffed: yes   Successful intubation technique: direct laryngoscopy  Endotracheal tube insertion site: oral  Blade: Krishna  Blade size: #3  ETT size: 7.0 mm  Cormack-Lehane Classification: grade I - full view of glottis  Placement verified by: chest auscultation and capnometry   Cuff volume (mL): 7  Measured from: lips  ETT to lips (cm): 20  Number of attempts at approach: 1    Additional Comments  ATOETx1. No change in dentition.

## 2017-11-16 NOTE — PLAN OF CARE
Problem: Perioperative Period (Adult)  Goal: Signs and Symptoms of Listed Potential Problems Will be Absent or Manageable (Perioperative Period)  Outcome: Ongoing (interventions implemented as appropriate)    11/16/17 1146   Perioperative Period   Problems Assessed (Perioperative Period) all   Problems Present (Perioperative Period) none

## 2017-11-16 NOTE — ANESTHESIA POSTPROCEDURE EVALUATION
Patient: Bhumi Gimenez    Procedure Summary     Date Anesthesia Start Anesthesia Stop Room / Location    11/16/17 0831 1016  [facility]       Procedure Diagnosis Surgeon Provider    Reverse Total Shoulder Arthroplasty (Right Shoulder) Rotator cuff tear arthropathy, right  (Rotator cuff tear arthropathy, right [M12.811]) MD Yuridia Amaya MD          Anesthesia Type: general  Last vitals  BP   129/70 (11/16/17 1045)   Temp   34.9 °C (94.8 °F) (11/16/17 1011)   Pulse   57 (11/16/17 1045)   Resp   14 (11/16/17 1045)     SpO2   100 % (11/16/17 1045)     Post Anesthesia Care and Evaluation    Patient location during evaluation: PACU  Patient participation: complete - patient participated  Level of consciousness: awake  Pain management: adequate  Airway patency: patent  Anesthetic complications: No anesthetic complications  PONV Status: none  Cardiovascular status: acceptable  Respiratory status: acceptable  Hydration status: acceptable

## 2017-11-16 NOTE — BRIEF OP NOTE
TOTAL SHOULDER REVERSE ARTHROPLASTY  Progress Note    Bhumi Gimenez  11/16/2017    Pre-op Diagnosis:   Rotator cuff tear arthropathy, right [M12.811]       Post-Op Diagnosis Codes:     * Rotator cuff tear arthropathy, right [M12.811]    Procedure/CPT® Codes:      Procedure(s):  Reverse Total Shoulder Arthroplasty    Surgeon(s):  Sabino Harley MD    Anesthesia: General with Block    Staff:   Circulator: Melissa Dalal RN; Kye Galindo RN  Scrub Person: Cathy Laws  Vendor Representative: Angelito Bautista  Assistant: Luis Meade CSA    Estimated Blood Loss: minimal    Urine Voided: * No values recorded between 11/16/2017  8:26 AM and 11/16/2017 10:00 AM *    Specimens:                None      Drains:           Findings: see dictation    Complications: none      Sabino Harley MD     Date: 11/16/2017  Time: 10:02 AM

## 2017-11-16 NOTE — ANESTHESIA PROCEDURE NOTES
Peripheral Block    Patient location during procedure: holding area  Start time: 11/16/2017 7:59 AM  Stop time: 11/16/2017 8:05 AM  Reason for block: at surgeon's request and post-op pain management  Performed by  Anesthesiologist: YUDITH GUNTER  Preanesthetic Checklist  Completed: patient identified, site marked, surgical consent, pre-op evaluation, timeout performed, IV checked, risks and benefits discussed and monitors and equipment checked  Prep:  Pt Position: supine  Sterile barriers:cap, gloves and mask  Prep: ChloraPrep  Patient monitoring: blood pressure monitoring, continuous pulse oximetry and EKG  Procedure  Sedation:yes  Performed under: local infiltration  Guidance:ultrasound guided  ULTRASOUND INTERPRETATION.  Using ultrasound guidance a 22 G (22G needle for placement of 3cc 2%lido to site prior to start of procedure.) gauge needle was placed in close proximity to the brachial plexus nerve, at which point, under ultrasound guidance anesthetic was injected in the area of the nerve and spread of the anesthesia was seen on ultrasound in close proximity thereto.  There were no abnormalities seen on ultrasound; a digital image was taken; and the patient tolerated the procedure with no complications. Images:still images obtained    Laterality:right  Block Type:interscalene  Injection Technique:single-shot  Needle Type:echogenic  Needle Gauge:22 G    Medications  Local Injected:ropivacaine 0.5% without epinephrine Local Amount Injected:20mL  Post Assessment  Injection Assessment: negative aspiration for heme, no paresthesia on injection and incremental injection  Patient Tolerance:comfortable throughout block  Complications:no

## 2017-11-16 NOTE — ANESTHESIA PREPROCEDURE EVALUATION
Anesthesia Evaluation     Patient summary reviewed and Nursing notes reviewed   no history of anesthetic complications:  NPO Solid Status: > 8 hours       Airway   Mallampati: II  TM distance: >3 FB  Neck ROM: full  no difficulty expected  Dental - normal exam   (+) edentulous    Pulmonary - negative pulmonary ROS and normal exam   Cardiovascular - normal exam  Exercise tolerance: good (4-7 METS)    ECG reviewed  Rhythm: regular  Rate: normal    (+) hypertension well controlled, dysrhythmias Atrial Fib, PVD, DVT resolved,       Neuro/Psych  (+) numbness,    GI/Hepatic/Renal/Endo    (+)  GERD well controlled, liver disease, renal disease CRI,     ROS Comment: Chronic pancreatitis      ACUTE PANCREATITIS 11/20/2013        Musculoskeletal     Abdominal  - normal exam   Substance History   (+) alcohol use,       Comment: Hx/o EtOH abuse   OB/GYN          Other   (+) arthritis   history of cancer remission                                    Anesthesia Plan    ASA 3     general   (GA w/ ISB)  intravenous induction   Anesthetic plan and risks discussed with patient.    Plan discussed with CRNA and attending.

## 2017-11-17 VITALS
RESPIRATION RATE: 16 BRPM | TEMPERATURE: 97.4 F | HEART RATE: 66 BPM | DIASTOLIC BLOOD PRESSURE: 60 MMHG | BODY MASS INDEX: 18.08 KG/M2 | HEIGHT: 64 IN | SYSTOLIC BLOOD PRESSURE: 114 MMHG | OXYGEN SATURATION: 94 % | WEIGHT: 105.9 LBS

## 2017-11-17 LAB
HCT VFR BLD AUTO: 32.1 % (ref 35.6–45.5)
HGB BLD-MCNC: 10.2 G/DL (ref 11.9–15.5)

## 2017-11-17 PROCEDURE — 85018 HEMOGLOBIN: CPT | Performed by: ORTHOPAEDIC SURGERY

## 2017-11-17 PROCEDURE — 85014 HEMATOCRIT: CPT | Performed by: ORTHOPAEDIC SURGERY

## 2017-11-17 PROCEDURE — 25010000003 CEFAZOLIN PER 500 MG: Performed by: ORTHOPAEDIC SURGERY

## 2017-11-17 RX ORDER — ASPIRIN 81 MG/1
81 TABLET ORAL DAILY
Status: ON HOLD
Start: 2017-11-17 | End: 2021-05-12

## 2017-11-17 RX ORDER — ONDANSETRON 4 MG/1
4 TABLET, FILM COATED ORAL EVERY 6 HOURS PRN
Qty: 20 TABLET | Refills: 0 | Status: SHIPPED | OUTPATIENT
Start: 2017-11-17 | End: 2020-01-16

## 2017-11-17 RX ORDER — PSEUDOEPHEDRINE HCL 30 MG
100 TABLET ORAL 2 TIMES DAILY
Qty: 60 CAPSULE | Refills: 0 | Status: SHIPPED | OUTPATIENT
Start: 2017-11-17 | End: 2020-01-16

## 2017-11-17 RX ORDER — OXYCODONE HCL 10 MG/1
10 TABLET, FILM COATED, EXTENDED RELEASE ORAL EVERY 12 HOURS SCHEDULED
Qty: 4 TABLET | Refills: 0
Start: 2017-11-17 | End: 2017-11-19

## 2017-11-17 RX ORDER — OXYCODONE AND ACETAMINOPHEN 7.5; 325 MG/1; MG/1
TABLET ORAL
Qty: 60 TABLET | Refills: 0
Start: 2017-11-17 | End: 2020-01-16

## 2017-11-17 RX ADMIN — FUROSEMIDE 20 MG: 20 TABLET ORAL at 08:52

## 2017-11-17 RX ADMIN — PANCRELIPASE 24000 UNITS OF LIPASE: 60000; 12000; 38000 CAPSULE, DELAYED RELEASE PELLETS ORAL at 08:51

## 2017-11-17 RX ADMIN — OXYCODONE HYDROCHLORIDE AND ACETAMINOPHEN 2 TABLET: 7.5; 325 TABLET ORAL at 01:21

## 2017-11-17 RX ADMIN — PANCRELIPASE 24000 UNITS OF LIPASE: 60000; 12000; 38000 CAPSULE, DELAYED RELEASE PELLETS ORAL at 11:35

## 2017-11-17 RX ADMIN — MUPIROCIN: 20 OINTMENT TOPICAL at 08:52

## 2017-11-17 RX ADMIN — LOSARTAN POTASSIUM 50 MG: 50 TABLET, FILM COATED ORAL at 08:52

## 2017-11-17 RX ADMIN — OXYCODONE HYDROCHLORIDE AND ACETAMINOPHEN 2 TABLET: 7.5; 325 TABLET ORAL at 14:31

## 2017-11-17 RX ADMIN — CEFAZOLIN SODIUM 2 G: 1 INJECTION, POWDER, FOR SOLUTION INTRAMUSCULAR; INTRAVENOUS at 01:21

## 2017-11-17 RX ADMIN — OXYCODONE HYDROCHLORIDE AND ACETAMINOPHEN 2 TABLET: 7.5; 325 TABLET ORAL at 09:50

## 2017-11-17 RX ADMIN — CARVEDILOL 12.5 MG: 12.5 TABLET, FILM COATED ORAL at 08:51

## 2017-11-17 RX ADMIN — FERROUS SULFATE TAB 325 MG (65 MG ELEMENTAL FE) 325 MG: 325 (65 FE) TAB at 08:51

## 2017-11-17 RX ADMIN — OXYCODONE HYDROCHLORIDE AND ACETAMINOPHEN 2 TABLET: 7.5; 325 TABLET ORAL at 05:23

## 2017-11-17 RX ADMIN — PANTOPRAZOLE SODIUM 40 MG: 40 TABLET, DELAYED RELEASE ORAL at 05:23

## 2017-11-17 NOTE — PLAN OF CARE
Problem: Patient Care Overview (Adult)  Goal: Plan of Care Review  Outcome: Ongoing (interventions implemented as appropriate)    11/17/17 0351   Coping/Psychosocial Response Interventions   Plan Of Care Reviewed With patient   Patient Care Overview   Progress improving   Outcome Evaluation   Outcome Summary/Follow up Plan HTN well controlled with po meds. pain well controlled. pt amb well with assist.          Problem: Self-Care Deficit (Adult,Obstetrics,Pediatric)  Goal: Improved Ability to Perform BADL and IADL  Outcome: Ongoing (interventions implemented as appropriate)    11/17/17 0351   Self-Care Deficit (Adult,Obstetrics,Pediatric)   Improved Ability to Perform BADL and IADL making progress toward outcome         Problem: Fall Risk (Adult)  Goal: Absence of Falls  Outcome: Ongoing (interventions implemented as appropriate)    11/17/17 0351   Fall Risk (Adult)   Absence of Falls making progress toward outcome

## 2017-11-17 NOTE — PROGRESS NOTES
Case Management Discharge Note    Final Note: DC home via private auto    Discharge Placement     No information found        Other: Other (private auto)    Discharge Codes: 01  Discharge to home

## 2017-11-17 NOTE — SIGNIFICANT NOTE
11/17/17 1125   Rehab Treatment   Discipline physical therapist   Rehab Evaluation   Evaluation Not Performed other (see comments)  (Provided and reviewed written HEP.  No questions or concerns from pt.  No acute PT indicated. )

## 2017-11-17 NOTE — DISCHARGE SUMMARY
Date of Admission:  11/16/2017    Date of Discharge:  11/17/2017    Discharge Diagnosis: s/p Right reverse total shoulder arthroplasty    Admitting Physician: Sabino Harley    Consults: none    DETAILS OF HOSPITAL STAY:  Patient is a 77 y.o. female was admitted to the floor following a reverse total shoulder arthroplasty.  Post-operatively the patient was transferred to the post-operative floor where the patient underwent physical therapy including both active and passive ROM exercises. Opioids were titrated to achieve appropriate pain management to allow for participation in mobilization exercises. Vital signs are now stable. The incision is benign without signs or symptoms of infection. Operative extremity neurovascular status remains intact. Appropriate education re: incision care, activity levels, medications, and follow up visits was completed and all questions were answered. The patient is now deemed stable for discharge to home.    Condition on Discharge:  Stable    Discharge Medications   Bhumi Gimenez   Home Medication Instructions GRISEL:[id number]    Printed on:11/17/17 7331   Medication Information                      aspirin 81 MG EC tablet  Take 1 tablet by mouth Daily. Resume 11/19/17             carvedilol (COREG) 12.5 MG tablet  Take 1 tablet by mouth 2 (two) times a day.             docusate sodium 100 MG capsule  Take 100 mg by mouth 2 (Two) Times a Day.             ferrous sulfate 325 (65 FE) MG EC tablet  Take 325 mg by mouth Daily With Breakfast.             furosemide (LASIX) 20 MG tablet  Take 1 tablet by mouth 2 (two) times a day.             losartan (COZAAR) 50 MG tablet  Take 1 tablet by mouth daily.             ondansetron (ZOFRAN) 4 MG tablet  Take 1 tablet by mouth Every 6 (Six) Hours As Needed for Nausea or Vomiting.             OxyCODONE HCl ER (oxyCONTIN) 10 MG 12 hr tablet  Take 1 tablet by mouth Every 12 (Twelve) Hours for 4 doses.             oxyCODONE-acetaminophen  (PERCOCET) 7.5-325 MG per tablet  Take 1-2 tabs po q 4 hours prn pain             pancrelipase, Lip-Prot-Amyl, (CREON) 39982 UNITS capsule delayed-release particles capsule  Take 2 capsules by mouth 3 (Three) Times a Day With Meals.             pantoprazole (PROTONIX) 40 MG EC tablet  Take 1 tablet by mouth daily.             potassium chloride ER (K-TAB) 20 MEQ tablet controlled-release ER tablet  Take 1 tablet by mouth Daily.             Probiotic capsule  Take 1 tablet by mouth Daily. HOLD PRIOR TO SURGERY                 Discharge Diet: regular    Activity at Discharge: as tolerated    Discharge Instructions:   1)  Patient is to continue with physical therapy exercises daily and continue working with the physical therapist as ordered.  2)  Continue to follow precautions as instructed.   3)  Patient is instructed to continue use of the sling except when performing their physical therapy exercising and while dressing or showering.  4)  Continue RYAN hose daily and ice regularly.  Patient also instructed on incentive spirometer during hospitalization and encouraged to continue to use at home regularly.   5)  The dressing should be left in place. If waterproof dressing is intact the patient may shower immediately following discharge. If the dressing becomes disloged or saturated it should be changed and patient must wait until POD #5 to shower. If dressing is changed, apply dry sterile dressing after showering.  6)  Follow up appointment in 2 weeks - patient to call the office at [phone] to schedule.     Follow-up Appointments  2 weeks with Dr Cherri Harley MD  11/17/17  12:55 PM

## 2017-11-29 ENCOUNTER — OFFICE VISIT (OUTPATIENT)
Dept: ORTHOPEDIC SURGERY | Facility: CLINIC | Age: 77
End: 2017-11-29

## 2017-11-29 VITALS — HEIGHT: 64 IN | WEIGHT: 109 LBS | BODY MASS INDEX: 18.61 KG/M2

## 2017-11-29 DIAGNOSIS — Z96.611 STATUS POST REVERSE ARTHROPLASTY OF RIGHT SHOULDER: Primary | ICD-10-CM

## 2017-11-29 PROCEDURE — 73030 X-RAY EXAM OF SHOULDER: CPT | Performed by: ORTHOPAEDIC SURGERY

## 2017-11-29 PROCEDURE — 99024 POSTOP FOLLOW-UP VISIT: CPT | Performed by: ORTHOPAEDIC SURGERY

## 2017-11-29 RX ORDER — OXYCODONE AND ACETAMINOPHEN 7.5; 325 MG/1; MG/1
1 TABLET ORAL EVERY 4 HOURS PRN
Qty: 50 TABLET | Refills: 0 | Status: SHIPPED | OUTPATIENT
Start: 2017-11-29 | End: 2018-01-29 | Stop reason: SDUPTHER

## 2017-11-29 NOTE — PROGRESS NOTES
"Bhumi Gimenez : 1940 MRN: [id number] DATE: 2017    DIAGNOSIS:  2 week follow up right shoulder arthroplasty      SUBJECTIVE:  Patient returns today for 2 week follow up of right shoulder replacement. Patient reports doing well with no unusual complaints.      OBJECTIVE:    Ht 63.5\" (161.3 cm)  Wt 109 lb (49.4 kg)  BMI 19.01 kg/m2    Exam:. The incision is well approximated. No erythema or drainage. Small anterior hematoma.  Shoulder moves fluidly with pendulums . The arm is soft and nontender.  Good strength in hand and wrist.  Distal sensation intact.  Palpable radial pulse.    DIAGNOSTIC STUDIES    Xrays: 2 views of the right shoulder (AP, scapular Y) were ordered and reviewed for evaluation of shoulder replacement.  They demonstrate a well positioned, well aligned replacement without complicating factors noted.  In comparison with previous films, there has been no change.    ASSESSMENT: 2 week follow up right shoulder replacement.    PLAN:   1.  Due to her hematoma, I recommend holding off on formal PT for now.    2.  Continue sling until next visit.  I will see her back in 2 weeks and start PT at that time.  3.  Counseled patient about appropriate activity modifications and restrictions, including no driving at this point.  4.  I agreed to refill her pain medicine.  The risks were discussed.    Sabino Harley MD    "

## 2017-12-13 ENCOUNTER — OFFICE VISIT (OUTPATIENT)
Dept: ORTHOPEDIC SURGERY | Facility: CLINIC | Age: 77
End: 2017-12-13

## 2017-12-13 VITALS — WEIGHT: 106 LBS | BODY MASS INDEX: 18.48 KG/M2 | TEMPERATURE: 98 F

## 2017-12-13 DIAGNOSIS — Z96.611 STATUS POST REVERSE TOTAL REPLACEMENT OF RIGHT SHOULDER: Primary | ICD-10-CM

## 2017-12-13 PROCEDURE — 99024 POSTOP FOLLOW-UP VISIT: CPT | Performed by: ORTHOPAEDIC SURGERY

## 2017-12-13 NOTE — PROGRESS NOTES
Ms Vieyra comes in today for follow-up of the right shoulder.  She tells me the anterior swelling is largely resolved at this point.  She tells me she is feeling much better and she is anxious to start therapy.    Her wound is well healed.  Hematoma appears resolved.  No drainage or erythema.  Passive shoulder motion is well tolerated up to approximately 120° of forward elevation and 30° of external rotation.    I'm going to send her to therapy at this point.  I gave her a prescription for that as well as a copy of my rehabilitation protocol.  I offered her a refill of her pain medicine but she says that that is unnecessary at this time--her pain is minimal at this point.  I will see her back in 6 weeks.

## 2017-12-13 NOTE — PATIENT INSTRUCTIONS
IVAN Harley MD  Reverse Total Shoulder Arthroplasty      General Notes:    Please be aware that all timeframes and exercises described below are designed to serve as guidelines only.  While appropriate for the majority of patients, on occasion the therapy and progression to have to be tailored to meet certain individual needs.  Progression through the therapy protocol will be based upon the criteria described rather than adherence to a strict timeframe.  Progress will be agreed upon by the patient, therapist and physician to ensure that we are all in accordance.      Remember to ice 15-20 minutes after each therapy session.  All range of motion and strengthening exercises should be performed as tolerated.  Pain and/or increased swelling are signs that you are doing too much too soon.  If these occur, please decrease your activity level and ice.    Passive means someone will do this for you.  Active assist means you can use your other arm to perform the exercise.  Active means you can use the arm that was operated on to do this exercise.  The scapular plane is defined as 30 degrees of abduction and forward flexion with neutral rotation.    Please be aware:  There is a higher risk of dislocation following a reverse shoulder replacement than a standard shoulder replacement.  Shoulder extension past neutral and the combination of shoulder adduction and internal rotation should be avoided for 3 months after surgery.  The most common activities that cause a dislocation are tucking in a shirt or performing bathroom/personal hygiene with the operative arm.    ===================================================================    Post-operative Phase 1:  Weeks 0-6    Goals:  1.  Ensure wound healing  2.  Protect the repair  3.  Decrease pain              Page two    Brace:      Wear sling/pillow at all times except when showering or performing pendulum exercises.  You should remove the sling and perform  pendulum exercises 5 or 6 times per day for 5 to 10 minutes at a time.  For the first 6 weeks, when lying supine, please place a pillow or rolled towel behind your arm to limit extension, which places stress on the tissues on the front of your shoulder.  A good rule of thumb is that you should always be able to visualize your elbow when lying down.    Weight Bearing (WB) status:      No lifting of objects heavier than a coffee cup.  No shoulder extension or sudden reaching.  No shoulder motion behind the back.    Range of Motion (ROM):      NO ACTIVE ROM OF THE SHOULDER.  Full active ROM of elbow, wrist, and hand.  Passive supine forward flexion in scapular plane (arm moved up in front of the body) to 90 degrees for weeks 1 and 2.  Passive supine external rotation in the scapular plane (arm turned away from the side of the body) to 20 degrees.  Advance passive supine forward flexion to 140, as tolerated, at week 3.  Initiate active assist ROM at week 4.  No shoulder extension.    Therapy Exercises:      Full finger, wrist and elbow motion.  Shoulder pendulum exercises.  Supine external rotation in scapular plan to 20 degrees.  Supine passive arm elevation in scapular plane to 90 degrees (weeks 1 and 2) then progress to 140 degrees passive forward elevation in scapular plane as tolerated. Begin AAROM at week 4.  Ball squeeze exercises.  No shoulder extension.  Begin sub-maximal, sub-painful periscapular and deltoid isometrics in scapular plane at week 4.   Avoid shoulder extension when isolating posterior deltoid.    Modalities:      Icing, manual massage, galvanic stimulation for pain control.    Cardio:      Stationary bike, elliptical machine, stair stepper    Criteria for Progression to Phase 2:      Pain controlled.  Wounds healing well.  Tolerating shoulder PROM and isometrics.  Able to isometrically activate all components of deltoid and periscapular muscles in the scapular  plane.    ===================================================================      Page three    Post-operative Phase 2:  Weeks 7-8    Goals:  1.  Protect the shoulder arthroplasty  2.  Prevent shoulder stiffness  3.  Improve range of motion  4.  Begin strengthening    Brace:      Gradually discontinue use of the sling/pillow.      Weight Bearing (WB) status:      No lifting of objects heavier than a coffee cup.  No excessive stretching or sudden movements.  You may begin to use the arm for routine daily activities such as feeding, dressing, bathing and personal hygiene but continue to avoid shoulder extension.  You may raise the arm away from the body, but not while carrying anything heavier than a cup of coffee.  No forceful pushing or pulling.  No supporting your body weight with your hands.    Range of Motion (ROM):      Continue previous ROM exercises.   Advance AAROM as tolerated.  Progress from supine to sitting/standing.  Continue to avoid shoulder extension especially in adduction and internal rotation.  Begin passive internal rotation in the scapular plane but restrict to less than 45 degrees.    Therapy Exercises:      Continue previous exercises.  Advance passive and active assist ROM as described.  Periscapular, deltoid isometrics may be progressed to sub-maximal, sub-painful isotonic strengthening exercises by the end of the 8th week.  Begin gentle shoulder internal and external rotation isometrics.    Modalities:      Icing, manual massage, galvanic stimulation for pain control.    Cardio:      Stationary bike, elliptical machine, stair stepper.  Treadmill walking after week 6.    Criteria for Progression to Phase 3:      Pain controlled.  Wounds healed.  Should have 140 degrees of forward elevation and 30 to 45 degrees external rotation at side.    ===================================================================  Page four    Post-operative Phase 3:  Weeks 9-12    Goals:  1.  Protect the shoulder  arthroplasty  2.  Continue ROM gains  3.  Advance strengthening  4.  Increase functional activities    Brace:      None    Weight Bearing (WB) status:      No heavy lifting or manual labor.  No shoulder extension, excessive stretching or sudden movements.  No supporting body weight with the hands.    Range of Motion (ROM):      Continue previous passive, AAROM.  Advance to active range of motion supine forward flexion and elevation in the plane of the scapula.  Continue to avoid shoulder extension.    Therapy Exercises:      Continue all exercises listed above.  Advance ROM as described above.  Advance strengthening as tolerated with gradual progression from isometrics to bands to light weights.  Start with 1 lb. and progress to a maximum of 3 lbs.  Upper body ergometer.     Modalities:      Only as needed.  Continue to ice after therapy.    Cardio:      Can advance to straight line running by week 12.    Criteria for Progression to Phase 4:      Full non-painful range of motion.  Improving strength and ability to isotonically activate all components of the deltoid and periscapular muscles.    ===================================================================                Page five    Post-operative Phase 4:  Weeks 13-18    Goals:  1.  Protect the shoulder arthroplasty  2.  Continue gentle strengthening  3.  Add functional activities  4.  Improve neuromuscular control    Brace:  None    Weight Bearing (WB) status:      No forceful pushing or pulling.  No supporting your body weight with your hands.  No lifting greater than 5 lbs. with the operative arm.    Range of Motion (ROM):    Continue full motion with light stretching at extremes.    Therapy Exercises:      Continue previous exercises as necessary.  Advance strengthening as tolerated.  May gradually begin eccentrically resisted motion, plyometrics and proprioception exercises.    Modalities:      Icing as necessary.    Cardio:      May begin cutting  running.      Criteria for Progression to Phase 5:      Full painless ROM, strength steadily improving.    ===================================================================    Post-operative Phase 5:  3 months - 6 months    Goals:  1.  Continue to protect shoulder arthroplasty by avoiding excessively heavy weights or  excessive force  2.  Restore full strength  3.  Gradual return to full function and sport    Brace:  None  Page six    Weight Bearing (WB) status:      I recommend that reverse shoulder arthroplasty patients refrain from any heavy lifting or  impact sports long term.   This implant should be considered a salvage procedure designed to improve your pain and function for routine activities of daily living only.  Your shoulder has been replaced with a metal and plastic shoulder joint which has a finite lifetime.  This implant will wear out much faster if you participate in activities that place excessive stress on these materials.  Nonimpact activities including jogging/running, walking, and bicycling are fine.  If you have any questions as to whether or not a particular activity is safe, I will be happy to discuss this with you.    Range of Motion (ROM):  Full    Therapy Exercises:    Continue to advance strengthening, motion, and progress to advanced conditioning as tolerated.    Modalities:      As needed.    Cardio:      As tolerated.

## 2017-12-19 ENCOUNTER — TREATMENT (OUTPATIENT)
Dept: PHYSICAL THERAPY | Facility: CLINIC | Age: 77
End: 2017-12-19

## 2017-12-19 DIAGNOSIS — Z96.611 STATUS POST REVERSE TOTAL SHOULDER REPLACEMENT, RIGHT: Primary | ICD-10-CM

## 2017-12-19 PROCEDURE — G8985 CARRY GOAL STATUS: HCPCS | Performed by: PHYSICAL THERAPIST

## 2017-12-19 PROCEDURE — 97110 THERAPEUTIC EXERCISES: CPT | Performed by: PHYSICAL THERAPIST

## 2017-12-19 PROCEDURE — 97530 THERAPEUTIC ACTIVITIES: CPT | Performed by: PHYSICAL THERAPIST

## 2017-12-19 PROCEDURE — 97140 MANUAL THERAPY 1/> REGIONS: CPT | Performed by: PHYSICAL THERAPIST

## 2017-12-19 PROCEDURE — 97161 PT EVAL LOW COMPLEX 20 MIN: CPT | Performed by: PHYSICAL THERAPIST

## 2017-12-19 PROCEDURE — G8984 CARRY CURRENT STATUS: HCPCS | Performed by: PHYSICAL THERAPIST

## 2017-12-21 ENCOUNTER — TREATMENT (OUTPATIENT)
Dept: PHYSICAL THERAPY | Facility: CLINIC | Age: 77
End: 2017-12-21

## 2017-12-21 DIAGNOSIS — Z96.611 STATUS POST REVERSE TOTAL SHOULDER REPLACEMENT, RIGHT: Primary | ICD-10-CM

## 2017-12-21 PROCEDURE — 97140 MANUAL THERAPY 1/> REGIONS: CPT | Performed by: PHYSICAL THERAPIST

## 2017-12-21 PROCEDURE — 97110 THERAPEUTIC EXERCISES: CPT | Performed by: PHYSICAL THERAPIST

## 2017-12-21 NOTE — PROGRESS NOTES
Physical Therapy Daily Progress Note    VISIT#: 2    Subjective   Bhumi Gimenez reports: that she was able to do her HEP but had a few questions about technique.  Denies any radiation of symptoms.       Objective   Passive flexion to 120*    See Exercise, Manual, and Modality Logs for complete treatment.     Patient Education: Required re-instruction with all HEP exercises    Assessment/Plan  Patient highly compliant with HEP but did need instruction to perform them correctly.  Minimal pain and near range limites set by MD at this time.     Progress per Plan of Care           Manual Therapy:    23     mins  96418;  Therapeutic Exercise:    30     mins  35392;     Neuromuscular Mekhi:    0    mins  61939;    Therapeutic Activity:     0     mins  59779;       Timed Treatment:   53   mins   Total Treatment:     90   mins    Marge Childs, PT  [place] License # [id number]  Physical Therapist

## 2017-12-26 ENCOUNTER — TREATMENT (OUTPATIENT)
Dept: PHYSICAL THERAPY | Facility: CLINIC | Age: 77
End: 2017-12-26

## 2017-12-26 DIAGNOSIS — Z96.611 STATUS POST REVERSE TOTAL SHOULDER REPLACEMENT, RIGHT: Primary | ICD-10-CM

## 2017-12-26 PROCEDURE — 97140 MANUAL THERAPY 1/> REGIONS: CPT | Performed by: PHYSICAL THERAPIST

## 2017-12-26 PROCEDURE — 97110 THERAPEUTIC EXERCISES: CPT | Performed by: PHYSICAL THERAPIST

## 2017-12-26 PROCEDURE — G0283 ELEC STIM OTHER THAN WOUND: HCPCS | Performed by: PHYSICAL THERAPIST

## 2017-12-26 NOTE — PROGRESS NOTES
Physical Therapy Daily Progress Note    VISIT#: 3    Subjective   Bhumi Gimenez reports: that she had a ;little muscle soreness after her last session but is not having any pain.  No NT or radicular pain.       Objective   Passive flexion supine 140* without stretching    See Exercise, Manual, and Modality Logs for complete treatment.     Patient Education: Proper technique with isometrics re-instruction    Assessment/Plan  Patient doing very well 6 weeks post RTSA.  At appropriate ROM point and minimal pain.  Highly motivated to return to functional activity.    Progress per Plan of Care           Manual Therapy:    20     mins  09285;  Therapeutic Exercise:    35     mins  16747;     Neuromuscular Mekhi:    0    mins  91308;    Therapeutic Activity:     0     mins  27813;       Timed Treatment:   55   mins   Total Treatment:     80   mins    Marge Childs, PT  [place] License # [id number]  Physical Therapist

## 2017-12-27 ENCOUNTER — TELEPHONE (OUTPATIENT)
Dept: ORTHOPEDIC SURGERY | Facility: CLINIC | Age: 77
End: 2017-12-27

## 2017-12-28 ENCOUNTER — TREATMENT (OUTPATIENT)
Dept: PHYSICAL THERAPY | Facility: CLINIC | Age: 77
End: 2017-12-28

## 2017-12-28 DIAGNOSIS — Z96.611 STATUS POST REVERSE TOTAL SHOULDER REPLACEMENT, RIGHT: Primary | ICD-10-CM

## 2017-12-28 PROCEDURE — 97110 THERAPEUTIC EXERCISES: CPT | Performed by: PHYSICAL THERAPIST

## 2017-12-28 PROCEDURE — G0283 ELEC STIM OTHER THAN WOUND: HCPCS | Performed by: PHYSICAL THERAPIST

## 2017-12-28 PROCEDURE — 97140 MANUAL THERAPY 1/> REGIONS: CPT | Performed by: PHYSICAL THERAPIST

## 2017-12-28 NOTE — PROGRESS NOTES
Physical Therapy Daily Progress Note    VISIT#: 4    Subjective   Bhumi Gimenez reports: that she has some new tenderness in the anterior shoulder today but the biceps tenderness has resolved.  States that she is returning to work on Sunday but is not going to do any lifting.       Objective   Tenderness in the biceps groove, no sharp pain    AROM flexion 118    See Exercise, Manual, and Modality Logs for complete treatment.     Patient Education: ready to progress to some active assistive elevation    Assessment/Plan  Patient very compliant with her HEP and rehab.  Now 6 weeks post RTSA.  Able to add isometric ER/IR today.  Did well with the addition of rotation exercises as well as the initiation of stabilization exercises.    Progress per Plan of Care           Manual Therapy:    24     mins  71471;  Therapeutic Exercise:    25/50     mins  46122;     Neuromuscular Mekhi:    0    mins  46012;    Therapeutic Activity:     0     mins  65892;       Timed Treatment:   49   mins   Total Treatment:     90   mins    Marge Childs, PT  [place] License # [id number]  Physical Therapist

## 2018-01-02 ENCOUNTER — TREATMENT (OUTPATIENT)
Dept: PHYSICAL THERAPY | Facility: CLINIC | Age: 78
End: 2018-01-02

## 2018-01-02 DIAGNOSIS — Z96.611 STATUS POST REVERSE TOTAL SHOULDER REPLACEMENT, RIGHT: Primary | ICD-10-CM

## 2018-01-02 PROCEDURE — G0283 ELEC STIM OTHER THAN WOUND: HCPCS | Performed by: PHYSICAL THERAPIST

## 2018-01-02 PROCEDURE — 97140 MANUAL THERAPY 1/> REGIONS: CPT | Performed by: PHYSICAL THERAPIST

## 2018-01-02 PROCEDURE — 97110 THERAPEUTIC EXERCISES: CPT | Performed by: PHYSICAL THERAPIST

## 2018-01-02 NOTE — PROGRESS NOTES
Physical Therapy Daily Progress Note    VISIT#: 5    Subjective   Bhumi Gimenez reports: that she has returned to work.  Notes fatigue with activity but no true pain.      Objective   Passive flexion 140, ER 50    Able to lift arm in supine against gravity to 90*    See Exercise, Manual, and Modality Logs for complete treatment.     Patient Education: cont HEP    Assessment/Plan  Patient is extremely compliant with HEP and rehab.  Is doing very well with motion and function.    Progress strengthening /stabilization /functional activity           Manual Therapy:    24     mins  22951;  Therapeutic Exercise:    30     mins  96827;     Neuromuscular Mekhi:    0    mins  63798;    Therapeutic Activity:     0     mins  86632;       Timed Treatment:   54   mins   Total Treatment:     100   mins    Marge Childs, PT  [place] License # [id number]  Physical Therapist

## 2018-01-04 ENCOUNTER — TREATMENT (OUTPATIENT)
Dept: PHYSICAL THERAPY | Facility: CLINIC | Age: 78
End: 2018-01-04

## 2018-01-04 DIAGNOSIS — Z96.611 STATUS POST REVERSE TOTAL SHOULDER REPLACEMENT, RIGHT: Primary | ICD-10-CM

## 2018-01-04 PROCEDURE — 97140 MANUAL THERAPY 1/> REGIONS: CPT | Performed by: PHYSICAL THERAPIST

## 2018-01-04 PROCEDURE — 97110 THERAPEUTIC EXERCISES: CPT | Performed by: PHYSICAL THERAPIST

## 2018-01-04 PROCEDURE — G0283 ELEC STIM OTHER THAN WOUND: HCPCS | Performed by: PHYSICAL THERAPIST

## 2018-01-05 NOTE — PROGRESS NOTES
Physical Therapy Daily Progress Note    VISIT#: 6    Subjective   Bhumi Gimenez reports: that she has some increased soreness in the anterior shoulder today but thinks that it is because she has now worked 5 days in a row and is just fatigued.  Denies any occurrence or sharp pain.      Objective   Tenderness in  Biceps and anterior deltoid but not severe.     Passive flexion to 130 today without increased pain    See Exercise, Manual, and Modality Logs for complete treatment.     Patient Education: Let shoulder rest as she is off work for a few days    Assessment/Plan  Patient continues to do well post RTSA.  Soreness from increased use at work but no sharp pain or sense of dysfunction. Patient highly motivated. Poor scapular stabilization at present.    Progress strengthening /stabilization /functional activity           Manual Therapy:    19     mins  51965;  Therapeutic Exercise:    24/35     mins  28505;     Neuromuscular Mekhi:    0    mins  75015;    Therapeutic Activity:     0     mins  80808;       Timed Treatment:   43  mins   Total Treatment:     85   mins    Marge Childs, PT  [place] License # [id number]  Physical Therapist

## 2018-01-08 ENCOUNTER — TREATMENT (OUTPATIENT)
Dept: PHYSICAL THERAPY | Facility: CLINIC | Age: 78
End: 2018-01-08

## 2018-01-08 DIAGNOSIS — Z96.611 STATUS POST REVERSE TOTAL SHOULDER REPLACEMENT, RIGHT: Primary | ICD-10-CM

## 2018-01-08 PROCEDURE — G0283 ELEC STIM OTHER THAN WOUND: HCPCS | Performed by: PHYSICAL THERAPIST

## 2018-01-08 PROCEDURE — 97140 MANUAL THERAPY 1/> REGIONS: CPT | Performed by: PHYSICAL THERAPIST

## 2018-01-08 PROCEDURE — 97110 THERAPEUTIC EXERCISES: CPT | Performed by: PHYSICAL THERAPIST

## 2018-01-08 NOTE — PROGRESS NOTES
Physical Therapy Daily Progress Note    VISIT#: 7    Subjective   Bhumi Gimenez reports: that her shoulder was sore after working 5 days in a row.  States that after being off for two days she feels better.  Notes that she had soreness mostly in the posterior dleltoid and triceps.      Objective   Active flexion 110    See Exercise, Manual, and Modality Logs for complete treatment.     Patient Education: avoid over exertion     Assessment/Plan  Continuing to improve as the motion and function of the shoulder are increasing.  Highly compliant to HEP but may be doing too much at work.    Progress strengthening /stabilization /functional activity           Manual Therapy:    19     mins  85687;  Therapeutic Exercise:    30     mins  63984;     Neuromuscular Mekhi:    0    mins  44011;    Therapeutic Activity:     0     mins  22469;       Timed Treatment:   49   mins   Total Treatment:     75   mins    Marge Childs, PT  [place] License # [id number]  Physical Therapist

## 2018-01-18 ENCOUNTER — TREATMENT (OUTPATIENT)
Dept: PHYSICAL THERAPY | Facility: CLINIC | Age: 78
End: 2018-01-18

## 2018-01-18 DIAGNOSIS — Z96.611 STATUS POST REVERSE TOTAL SHOULDER REPLACEMENT, RIGHT: Primary | ICD-10-CM

## 2018-01-18 PROCEDURE — 97110 THERAPEUTIC EXERCISES: CPT | Performed by: PHYSICAL THERAPIST

## 2018-01-18 PROCEDURE — 97140 MANUAL THERAPY 1/> REGIONS: CPT | Performed by: PHYSICAL THERAPIST

## 2018-01-18 PROCEDURE — G0283 ELEC STIM OTHER THAN WOUND: HCPCS | Performed by: PHYSICAL THERAPIST

## 2018-01-18 NOTE — PROGRESS NOTES
Physical Therapy Daily Progress Note    VISIT#: 8    Subjective   Bhumi Gimenez reports: that she has some upper arm soreness but is not having any sharp pain.  She wants to be sure that she has not hurt herself      Objective   No palpable step offs, severe muscle spasm, pop/click with movement of right shioulder/upper arm    Psssive flexion 135    See Exercise, Manual, and Modality Logs for complete treatment.     Patient Education: Do not feel anything out of the norm in her right shoulder    Assessment/Plan  Patient very compliant with her HEP but still very weak in the ar.  Has good passive motion weakness prevents good active motion at this point.  Doing well according to protocol for 8-9 weeks post op RTSA.    Progress strengthening /stabilization /functional activity           Manual Therapy:    19     mins  23998;  Therapeutic Exercise:    30/45     mins  22131;     Neuromuscular Mekhi:    0    mins  86252;    Therapeutic Activity:     0     mins  05889;       Timed Treatment:   49   mins   Total Treatment:     90   mins    Marge Childs, PT  [place] License # [id number]  Physical Therapist

## 2018-01-22 ENCOUNTER — TREATMENT (OUTPATIENT)
Dept: PHYSICAL THERAPY | Facility: CLINIC | Age: 78
End: 2018-01-22

## 2018-01-22 DIAGNOSIS — Z96.611 STATUS POST REVERSE TOTAL SHOULDER REPLACEMENT, RIGHT: Primary | ICD-10-CM

## 2018-01-22 PROCEDURE — 97110 THERAPEUTIC EXERCISES: CPT | Performed by: PHYSICAL THERAPIST

## 2018-01-22 PROCEDURE — 97140 MANUAL THERAPY 1/> REGIONS: CPT | Performed by: PHYSICAL THERAPIST

## 2018-01-22 PROCEDURE — G0283 ELEC STIM OTHER THAN WOUND: HCPCS | Performed by: PHYSICAL THERAPIST

## 2018-01-22 NOTE — PROGRESS NOTES
Physical Therapy Daily Progress Note    VISIT#: 9    Subjective   Bhumi Gimenez reports: that she is using her arm more functionally now.  States that she has little pain unless she moves it quickly or at end ranges of motion.  States it is much easier to get dressed now.      Objective   Intermittent pop/click in anterior shoulder with flexion - no pain associated with motion    See Exercise, Manual, and Modality Logs for complete treatment.     Patient Education: avoid painful movements    Assessment/Plan  Patient very compliant with therapy.  Function of arm is improving.  On track in rehab for RTSA.    Progress strengthening /stabilization /functional activity           Manual Therapy:    19     mins  73991;  Therapeutic Exercise:    25/45     mins  21531;     Neuromuscular Mekhi:    0    mins  66875;    Therapeutic Activity:     0     mins  57102;       Timed Treatment:   44   mins   Total Treatment:     110   mins    Marge Childs, PT  [place] License # [id number]  Physical Therapist

## 2018-01-25 ENCOUNTER — TREATMENT (OUTPATIENT)
Dept: PHYSICAL THERAPY | Facility: CLINIC | Age: 78
End: 2018-01-25

## 2018-01-25 DIAGNOSIS — Z96.611 STATUS POST REVERSE TOTAL SHOULDER REPLACEMENT, RIGHT: Primary | ICD-10-CM

## 2018-01-25 PROCEDURE — 97140 MANUAL THERAPY 1/> REGIONS: CPT | Performed by: PHYSICAL THERAPIST

## 2018-01-25 PROCEDURE — 97110 THERAPEUTIC EXERCISES: CPT | Performed by: PHYSICAL THERAPIST

## 2018-01-25 PROCEDURE — G0283 ELEC STIM OTHER THAN WOUND: HCPCS | Performed by: PHYSICAL THERAPIST

## 2018-01-25 NOTE — PROGRESS NOTES
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  States that she did use her weights at home yesterday.      Objective   Supine AROM/PROM  Flexion 140/148, abduction 135/148    Slight tenderness in the proximal upper arm, no palpable pop/shift noted     See Exercise, Manual, and Modality Logs for complete treatment.     Patient Education: cont to follow restrictions    Assessment/Plan  Patient is doing very well 2 months post Right RTSA.  Her motion and strength are at or above expected levels.    Progress per Plan of Care  To see MD after next sessioin         Manual Therapy:    18     mins  94111;  Therapeutic Exercise:    25/50     mins  39255;     Neuromuscular Mekhi:    0    mins  83073;    Therapeutic Activity:     0     mins  59341;       Timed Treatment:   43   mins   Total Treatment:     100   mins    Marge Childs, PT  [place] License # [id number]  Physical Therapist      "

## 2018-01-29 ENCOUNTER — TREATMENT (OUTPATIENT)
Dept: PHYSICAL THERAPY | Facility: CLINIC | Age: 78
End: 2018-01-29

## 2018-01-29 ENCOUNTER — OFFICE VISIT (OUTPATIENT)
Dept: ORTHOPEDIC SURGERY | Facility: CLINIC | Age: 78
End: 2018-01-29

## 2018-01-29 VITALS — WEIGHT: 110 LBS | BODY MASS INDEX: 19.49 KG/M2 | HEIGHT: 63 IN

## 2018-01-29 DIAGNOSIS — Z96.611 STATUS POST REVERSE TOTAL SHOULDER REPLACEMENT, RIGHT: Primary | ICD-10-CM

## 2018-01-29 DIAGNOSIS — Z96.611 STATUS POST REVERSE TOTAL ARTHROPLASTY OF RIGHT SHOULDER: Primary | ICD-10-CM

## 2018-01-29 PROCEDURE — 73030 X-RAY EXAM OF SHOULDER: CPT | Performed by: ORTHOPAEDIC SURGERY

## 2018-01-29 PROCEDURE — 97110 THERAPEUTIC EXERCISES: CPT | Performed by: PHYSICAL THERAPIST

## 2018-01-29 PROCEDURE — 97140 MANUAL THERAPY 1/> REGIONS: CPT | Performed by: PHYSICAL THERAPIST

## 2018-01-29 PROCEDURE — 99024 POSTOP FOLLOW-UP VISIT: CPT | Performed by: ORTHOPAEDIC SURGERY

## 2018-01-29 PROCEDURE — G0283 ELEC STIM OTHER THAN WOUND: HCPCS | Performed by: PHYSICAL THERAPIST

## 2018-01-29 PROCEDURE — 97530 THERAPEUTIC ACTIVITIES: CPT | Performed by: PHYSICAL THERAPIST

## 2018-01-29 RX ORDER — IBUPROFEN 800 MG/1
800 TABLET ORAL 3 TIMES DAILY
Qty: 90 TABLET | Refills: 2 | Status: ON HOLD | OUTPATIENT
Start: 2018-01-29 | End: 2021-05-12

## 2018-01-29 RX ORDER — OXYCODONE AND ACETAMINOPHEN 7.5; 325 MG/1; MG/1
1 TABLET ORAL EVERY 8 HOURS PRN
Qty: 30 TABLET | Refills: 0 | Status: SHIPPED | OUTPATIENT
Start: 2018-01-29 | End: 2020-01-16

## 2018-01-29 RX ORDER — IBUPROFEN 800 MG/1
800 TABLET ORAL 3 TIMES DAILY
Qty: 90 TABLET | Refills: 2 | Status: SHIPPED | OUTPATIENT
Start: 2018-01-29 | End: 2018-01-29 | Stop reason: SDUPTHER

## 2018-01-29 NOTE — PROGRESS NOTES
MD Letter - Reassessment    Date of Initial Visit: Type: THERAPY  Noted: 12/19/2017  Today's Date: 1/29/2018  Patient seen for 11 sessions    Treatment has included: therapeutic exercise, neuromuscular re-education, manual therapy, electrical stimulation and cryotherapy    Subjective   Bhumi states that she is much better now than she was when shew last saw you.  She states that her active motion is greatly decreased, her pain has decreased but Is still present in the anterior/lateral aspect of the upper arm.  She states that the frequency of the pain has decreased considerably.  She states that she feels much stronger than it was a month ago.  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     Objective - she presents with equal arm swing  Shoulder AROM/PROM -  Flexion 115/146, Abduction 94/148,  ER 45/56,  IR 50/68    Rotation measurements are taken supine at 45* abduction   Strength - no true strength testing of shoulder mm as patient is only 9 weeks post surgery.  Does have at least 3+/5 strength in all mm with isometric testing  Sensation - intact  Palpation - minimal tenderness along the incision sites  Activity tolerances - she is currently performing all self care skills without difficulty.  She is able to reach into the lower shelf of the upper kitchen cabinets and retrieve light weight dishes.  She is able to hang a shirt or jacket up in the closet with the right hand without pain.    Assessment/Plan  Bhumi has been extremely compliant with her therapy Patient has demonstrated significant mprovement since the initiation of therapy.  The pain has  Decreased in  Both frequency and intensity.  The motion has increased.  The activity tolerances have increased, yet not to desired level.  I feel that the patient would benefit from continued therapy.  If you have any questions concerning the care, please do  not hesitate to contact me.          PT Signature: Marge Childs, PT    Decreased exercise time today as patient had MD appt    Therapeutic Exercise - 15/30   Minutes  Therapeutic Activities - 15  Minutes - Assessed for MD  Neuromuscular Re-education - 0 Minutes  Manual Therapy - 15  Minutes      Direct Billed Treatment Time - 45  Minutes  Total Treatment Time - 120  Minutes

## 2018-01-30 NOTE — PROGRESS NOTES
"Bhumi Gimenez : 1940 MRN: [id number] DATE: 2018    DIAGNOSIS: 3 month follow up right shoulder arthroplasty      SUBJECTIVE:  Patient returns today for 3 month follow up of right shoulder replacement. Patient reports doing well with no unusual complaints. Still in PT and reports making good progress.    OBJECTIVE:    Ht 160 cm (63\")  Wt 49.9 kg (110 lb)  BMI 19.49 kg/m2    Review of Systems negative except as above    Exam:. The incision is well healed. No effusion.  Range of motion is measured at forward elevation of 155°, external rotation of 40°, internal rotation to her back pocket. The arm is soft and nontender.  Good strength with elevation and abduction.  Sensation intact distally.  Brisk cap refill.    DIAGNOSTIC STUDIES    Xrays: 3 views of the right shoulder (AP, scapular Y and axillary) were ordered and reviewed for evaluation of shoulder replacement. They demonstrate a well positioned, well aligned replacement without complicating factors noted. In comparison with previous films there has been no change.    ASSESSMENT: 3 month follow up right shoulder replacement.    PLAN:   1. Continue activities as tolerated.  2.  Continue PT per protocol.  3.  I explained that one can expect continued improvement in motion, function, and any residual discomfort for up to 1 year after surgery.  4.  Follow up in 6 months unless experiencing any new or concerning symptoms.    Sabino Harley MD    "

## 2018-02-02 ENCOUNTER — TREATMENT (OUTPATIENT)
Dept: PHYSICAL THERAPY | Facility: CLINIC | Age: 78
End: 2018-02-02

## 2018-02-02 DIAGNOSIS — Z96.611 STATUS POST REVERSE TOTAL SHOULDER REPLACEMENT, RIGHT: Primary | ICD-10-CM

## 2018-02-02 PROCEDURE — G0283 ELEC STIM OTHER THAN WOUND: HCPCS | Performed by: PHYSICAL THERAPIST

## 2018-02-02 PROCEDURE — 97140 MANUAL THERAPY 1/> REGIONS: CPT | Performed by: PHYSICAL THERAPIST

## 2018-02-02 PROCEDURE — 97110 THERAPEUTIC EXERCISES: CPT | Performed by: PHYSICAL THERAPIST

## 2018-02-02 NOTE — PROGRESS NOTES
Physical Therapy Daily Progress Note    VISIT#: 12    Subjective   Bhumi Gimenez reports: that she saw Dr. Harley on Monday and he was pleased with her progress.  States that she still has a way to go, but is making good gains.  States that she was able to brush her hair today with the right hand today for the first time.         Objective   Difficulty with resisted ER & IR with bands today - Better with reinstruction for proper technique    See Exercise, Manual, and Modality Logs for complete treatment.     Patient Education:  Future progression of program weaning to HEP in gym if available    Assessment/Plan  Patient doing very well post RTSA.  Able to tolerate light resistance on multi station unit today.  Continues with weakness in elevation but is improving.    Progress per Plan of Care  Cont PT BIW         Manual Therapy:    18     mins  43634;  Therapeutic Exercise:    25/45     mins  23754;     Neuromuscular Mekhi:    0    mins  63017;    Therapeutic Activity:     0     mins  08647;       Timed Treatment:   43   mins   Total Treatment:     100   mins    Marge Childs, PT  [place] License # [id number]  Physical Therapist

## 2018-02-06 ENCOUNTER — TREATMENT (OUTPATIENT)
Dept: PHYSICAL THERAPY | Facility: CLINIC | Age: 78
End: 2018-02-06

## 2018-02-06 DIAGNOSIS — Z96.611 STATUS POST REVERSE TOTAL SHOULDER REPLACEMENT, RIGHT: Primary | ICD-10-CM

## 2018-02-06 PROCEDURE — 97140 MANUAL THERAPY 1/> REGIONS: CPT | Performed by: PHYSICAL THERAPIST

## 2018-02-06 PROCEDURE — G0283 ELEC STIM OTHER THAN WOUND: HCPCS | Performed by: PHYSICAL THERAPIST

## 2018-02-06 PROCEDURE — 97110 THERAPEUTIC EXERCISES: CPT | Performed by: PHYSICAL THERAPIST

## 2018-02-06 NOTE — PROGRESS NOTES
Physical Therapy Daily Progress Note    VISIT#: 13    Subjective   Bhumi Gimenez reports: that she is able to use her arm more at work without pain.   States that she feels her strength to be coming back slowly but steadily.       Objective   Passive flexion 145, ER 60* today    See Exercise, Manual, and Modality Logs for complete treatment.     Patient Education:  New exercise     Assessment/Plan  Patient very compliant with her program.  Strength and motion are increasing steadily.      Progress strengthening /stabilization /functional activity           Manual Therapy:    18     mins  13642;  Therapeutic Exercise:    25/45     mins  71699;     Neuromuscular Mekhi:    0    mins  71956;    Therapeutic Activity:     0     mins  28083;       Timed Treatment:   43   mins   Total Treatment:     100   mins    Marge Childs, PT  [place] License # [id number]  Physical Therapist

## 2018-02-08 ENCOUNTER — TREATMENT (OUTPATIENT)
Dept: PHYSICAL THERAPY | Facility: CLINIC | Age: 78
End: 2018-02-08

## 2018-02-08 DIAGNOSIS — Z96.611 STATUS POST REVERSE TOTAL SHOULDER REPLACEMENT, RIGHT: Primary | ICD-10-CM

## 2018-02-08 PROCEDURE — 97140 MANUAL THERAPY 1/> REGIONS: CPT | Performed by: PHYSICAL THERAPIST

## 2018-02-08 PROCEDURE — G0283 ELEC STIM OTHER THAN WOUND: HCPCS | Performed by: PHYSICAL THERAPIST

## 2018-02-08 PROCEDURE — 97110 THERAPEUTIC EXERCISES: CPT | Performed by: PHYSICAL THERAPIST

## 2018-02-08 NOTE — PROGRESS NOTES
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  Worried that the tightenss that she still feels is a problem.      Objective   Passive ER 75* at 80* abduction    Able to simulate golf swing without pain today    See Exercise, Manual, and Modality Logs for complete treatment.     Patient Education:  Add seated shoulder punch to HEP    Assessment/Plan  Patient very compliant and improving in both motion, strength and function.  Still not as strong as desired for home and sport activities.      Progress strengthening /stabilization /functional activity           Manual Therapy:    18     mins  51993;  Therapeutic Exercise:    25/50     mins  12947;     Neuromuscular Mekhi:    0    mins  95681;    Therapeutic Activity:     0     mins  90407;       Timed Treatment:   43   mins   Total Treatment:     110   mins    Marge Childs, PT  [place] License # [id number]  Physical Therapist      "

## 2018-02-15 ENCOUNTER — TREATMENT (OUTPATIENT)
Dept: PHYSICAL THERAPY | Facility: CLINIC | Age: 78
End: 2018-02-15

## 2018-02-15 DIAGNOSIS — Z96.611 STATUS POST REVERSE TOTAL SHOULDER REPLACEMENT, RIGHT: Primary | ICD-10-CM

## 2018-02-15 PROCEDURE — G0283 ELEC STIM OTHER THAN WOUND: HCPCS | Performed by: PHYSICAL THERAPIST

## 2018-02-15 PROCEDURE — 97110 THERAPEUTIC EXERCISES: CPT | Performed by: PHYSICAL THERAPIST

## 2018-02-15 PROCEDURE — 97140 MANUAL THERAPY 1/> REGIONS: CPT | Performed by: PHYSICAL THERAPIST

## 2018-02-15 NOTE — PROGRESS NOTES
Physical Therapy Daily Progress Note- Reassessment    VISIT#: 15    Subjective   Bhumi Gimenez reports: that she is feeling much better but still not strong enough for full function.  She states that the arm is not very painful at all anymore.  States that she is currently working at about 60% of what is normal for her.      Objective   Shoulder AROM/PROM  Flexion 130/158,  Abduction 120/156,  ER 48/67,  IR  67/78    Strength - Flexion 4-/5  Extension 4+/5  Abduction 4-/5  ER 3/5  IR  4/5    Minimal tenderness to palpation in the anterior and posterior joint lines    See Exercise, Manual, and Modality Logs for complete treatment.     Patient Education:    Assessment/Plan  Patient continues to improve in both there active motion and passive motion.  Strength has improved as well.  All STG met.  LTG #1 met.  LTG 2&3 partially met.  Add new LTG - Able to hit golf ball with functional swing without pain.    Progress strengthening /stabilization /functional activity           Manual Therapy:    16     mins  03340;  Therapeutic Exercise:    25/50     mins  75266;     Neuromuscular Mekhi:    0    mins  14742;    Therapeutic Activity:     0     mins  85063;       Timed Treatment:   41   mins   Total Treatment:     120   mins    Marge Childs, PT  [place] License # [id number]  Physical Therapist

## 2018-02-22 ENCOUNTER — TREATMENT (OUTPATIENT)
Dept: PHYSICAL THERAPY | Facility: CLINIC | Age: 78
End: 2018-02-22

## 2018-02-22 DIAGNOSIS — Z96.611 STATUS POST REVERSE TOTAL SHOULDER REPLACEMENT, RIGHT: Primary | ICD-10-CM

## 2018-02-22 PROCEDURE — 97110 THERAPEUTIC EXERCISES: CPT | Performed by: PHYSICAL THERAPIST

## 2018-02-22 PROCEDURE — G0283 ELEC STIM OTHER THAN WOUND: HCPCS | Performed by: PHYSICAL THERAPIST

## 2018-02-22 PROCEDURE — 97140 MANUAL THERAPY 1/> REGIONS: CPT | Performed by: PHYSICAL THERAPIST

## 2018-02-22 NOTE — PROGRESS NOTES
Physical Therapy Daily Progress Note    VISIT#: 16    Subjective   Bhumi Gimenez reports: that she is using her arm for more activities at home and at work now.        Objective   Supine flexion 144/153,  Abduction  154 /165 with slight flexion,  ER  65/74,  IR  64/68    Seated flexion 130, abduction  116    See Exercise, Manual, and Modality Logs for complete treatment.     Patient Education: OK to start swinging a short iron at  Home for preparation to return to golf    Assessment/Plan  Much improved active and passive motion in all planes of motion.  More funcitonal use of the arm at home and at work.  Able to swing short golf club in clinic today with slightly shortened back swing without pain.     Progress strengthening /stabilization /functional activity           Manual Therapy:    16     mins  16623;  Therapeutic Exercise:    25/50     mins  06982;     Neuromuscular Mekhi:    0    mins  43059;    Therapeutic Activity:     0     mins  49180;       Timed Treatment:   41   mins   Total Treatment:     100   mins    Marge Childs, PT  [place] License # [id number]  Physical Therapist

## 2018-02-27 ENCOUNTER — TREATMENT (OUTPATIENT)
Dept: PHYSICAL THERAPY | Facility: CLINIC | Age: 78
End: 2018-02-27

## 2018-02-27 DIAGNOSIS — Z96.611 STATUS POST REVERSE TOTAL SHOULDER REPLACEMENT, RIGHT: Primary | ICD-10-CM

## 2018-02-27 PROCEDURE — G0283 ELEC STIM OTHER THAN WOUND: HCPCS | Performed by: PHYSICAL THERAPIST

## 2018-02-27 PROCEDURE — 97110 THERAPEUTIC EXERCISES: CPT | Performed by: PHYSICAL THERAPIST

## 2018-02-27 PROCEDURE — 97140 MANUAL THERAPY 1/> REGIONS: CPT | Performed by: PHYSICAL THERAPIST

## 2018-02-27 NOTE — PROGRESS NOTES
Physical Therapy Daily Progress Note    VISIT#: 17    Subjective   Bhumi Gimenez reports: that she feels stronger every week.  States that she still is not as strong as she needs to be for work purposes.      Objective   Active flexion 130, abduction 120    Able to lift 2# in scaption without abnormal movement pattern    See Exercise, Manual, and Modality Logs for complete treatment.     Patient Education:     Assessment/Plan  Steadily improving from RTSA.  Highly motivated and compliant to HEP.    Progress strengthening /stabilization /functional activity           Manual Therapy:    16     mins  22145;  Therapeutic Exercise:    25/50     mins  10791;     Neuromuscular Mekhi:    0    mins  11886;    Therapeutic Activity:     0     mins  17882;       Timed Treatment:   41   mins   Total Treatment:     100   mins    Marge Childs, PT  [place] License # [id number]  Physical Therapist

## 2018-03-05 ENCOUNTER — TREATMENT (OUTPATIENT)
Dept: PHYSICAL THERAPY | Facility: CLINIC | Age: 78
End: 2018-03-05

## 2018-03-05 DIAGNOSIS — Z96.611 STATUS POST REVERSE TOTAL SHOULDER REPLACEMENT, RIGHT: Primary | ICD-10-CM

## 2018-03-05 PROCEDURE — 97110 THERAPEUTIC EXERCISES: CPT | Performed by: PHYSICAL THERAPIST

## 2018-03-05 PROCEDURE — G0283 ELEC STIM OTHER THAN WOUND: HCPCS | Performed by: PHYSICAL THERAPIST

## 2018-03-05 PROCEDURE — 97140 MANUAL THERAPY 1/> REGIONS: CPT | Performed by: PHYSICAL THERAPIST

## 2018-03-05 NOTE — PROGRESS NOTES
Physical Therapy Daily Progress Note    VISIT#: 18    Subjective   Bhumi Gimenez reports: that she is using her arm more functionally at work these days. States that her pain has greatly decreased in frequency.  Thinks that her motion is better now.      Objective   Active flexion 131, abduction 130  Passive flexion 145    See Exercise, Manual, and Modality Logs for complete treatment.     Patient Education: proper form with elevation    Assessment/Plan  Bhumi continues to demonstrate improvement in the use of hte shoulder.  Still has some weakness and lack of full motion but is much more easily able to lift light to moderate weight items with the right arm.    Progress strengthening /stabilization /functional activity           Manual Therapy:    16     mins  75356;  Therapeutic Exercise:    25/50     mins  68514;     Neuromuscular Mekhi:    0    mins  99770;    Therapeutic Activity:     0     mins  93537;       Timed Treatment:   41   mins   Total Treatment:     110   mins    Marge Childs, PT  [place] License # [id number]  Physical Therapist

## 2018-03-19 ENCOUNTER — TREATMENT (OUTPATIENT)
Dept: PHYSICAL THERAPY | Facility: CLINIC | Age: 78
End: 2018-03-19

## 2018-03-19 DIAGNOSIS — Z96.611 STATUS POST REVERSE TOTAL SHOULDER REPLACEMENT, RIGHT: Primary | ICD-10-CM

## 2018-03-19 PROCEDURE — G0283 ELEC STIM OTHER THAN WOUND: HCPCS | Performed by: PHYSICAL THERAPIST

## 2018-03-19 PROCEDURE — 97110 THERAPEUTIC EXERCISES: CPT | Performed by: PHYSICAL THERAPIST

## 2018-03-19 PROCEDURE — 97140 MANUAL THERAPY 1/> REGIONS: CPT | Performed by: PHYSICAL THERAPIST

## 2018-03-19 NOTE — PROGRESS NOTES
Physical Therapy Daily Progress Note-Reassessment    VISIT#: 19    Subjective   Bhumi Gimenez reports: that her shoulder is feeling much better than she did a month ago.  She reports some posterior shoulder aching over the past few days but states that she is no longer having any sharp pain.  States that she is much more functional at home but is still unable to swing a golf club comfortably and that is really important to her.      Objective   AROM - Flexion 131,  Abduction 120,  ER 60,  IR  60  PROM - flexion  150, abduction  145, ER 65, IT 65   Strength - Flexion 4/5,  Abduction 4/5,  ER 4/5,  IR 4/5    Palpable tenderness in the posterior upper arm - post deltoid region    Quick Dash Score 9.09 calculated    G code [id number]    See Exercise, Manual, and Modality Logs for complete treatment.     Patient Education: Cont HEP    Assessment/Plan   Bhumi continues to do very well post RTSA.  Her pain has nearly resolved, her motion both active and passive have increased.  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  All STG met, LTG #1 met, other LTG partially met.  Should be ready for discharge in improvement continues at this rate in 2-3 weeks.      Progress strengthening /stabilization /functional activity           Manual Therapy:    16     mins  40490;  Therapeutic Exercise:    25/50     mins  16051;     Neuromuscular Mekhi:    0    mins  23589;    Therapeutic Activity:     0     mins  12191;       Timed Treatment:   41   mins   Total Treatment:     100   mins    Marge Childs, PT  [place] License # [id number]  Physical Therapist

## 2018-03-22 ENCOUNTER — TREATMENT (OUTPATIENT)
Dept: PHYSICAL THERAPY | Facility: CLINIC | Age: 78
End: 2018-03-22

## 2018-03-22 DIAGNOSIS — Z96.611 STATUS POST REVERSE TOTAL SHOULDER REPLACEMENT, RIGHT: Primary | ICD-10-CM

## 2018-03-22 PROCEDURE — 97140 MANUAL THERAPY 1/> REGIONS: CPT | Performed by: PHYSICAL THERAPIST

## 2018-03-22 PROCEDURE — G0283 ELEC STIM OTHER THAN WOUND: HCPCS | Performed by: PHYSICAL THERAPIST

## 2018-03-22 PROCEDURE — 97110 THERAPEUTIC EXERCISES: CPT | Performed by: PHYSICAL THERAPIST

## 2018-03-22 NOTE — PROGRESS NOTES
Physical Therapy Daily Progress Note    VISIT#: 20    Subjective   Bhumi Gimenez reports: that she continues to get stronger.  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      Objective   Presents using the arm more fluidly    Able to make quick movements in attempts of catching small light weight ball    See Exercise, Manual, and Modality Logs for complete treatment.     Patient Education: cont HEP    Assessment/Plan  Bhumi continues making gains post RTSA.  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  Moving arm fluidly to catch light weight ball with improved scapulothoracic rhythm.     Progress strengthening /stabilization /functional activity           Manual Therapy:    16     mins  81908;  Therapeutic Exercise:    23/50     mins  77707;     Neuromuscular Mekhi:    0    mins  48726;    Therapeutic Activity:     0     mins  88971;       Timed Treatment:   39   mins   Total Treatment:     100   mins    Marge Childs, PT  [place] License # [id number]  Physical Therapist

## 2018-03-26 ENCOUNTER — TREATMENT (OUTPATIENT)
Dept: PHYSICAL THERAPY | Facility: CLINIC | Age: 78
End: 2018-03-26

## 2018-03-26 DIAGNOSIS — Z96.611 STATUS POST REVERSE TOTAL SHOULDER REPLACEMENT, RIGHT: Primary | ICD-10-CM

## 2018-03-26 PROCEDURE — 97140 MANUAL THERAPY 1/> REGIONS: CPT | Performed by: PHYSICAL THERAPIST

## 2018-03-26 PROCEDURE — G0283 ELEC STIM OTHER THAN WOUND: HCPCS | Performed by: PHYSICAL THERAPIST

## 2018-03-26 PROCEDURE — 97110 THERAPEUTIC EXERCISES: CPT | Performed by: PHYSICAL THERAPIST

## 2018-03-26 NOTE — PROGRESS NOTES
"Physical Therapy Daily Progress Note    VISIT#: 21    Subjective   Bhumi Gimenez reports: that she used the weights at the gym over the weekend and noticed some increased pain in the lateral and posterior shoulder.  No pop or clikc noted.  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        Objective   Supine flexion 153,  Abduction 137,  ER 67,  IR 78  Seated   \" 140 \"   128     Tenderness in lateral deltoid     See Exercise, Manual, and Modality Logs for complete treatment.     Patient Education: avoid lat pull exercise at her home gym    Assessment/Plan  Patient continuing to do well post RTSA as she is so compliant with therapy and HEP.  Need to work on swinging golf club now to see if available ROM will allow her to play.    Progress strengthening /stabilization /functional activity           Manual Therapy:    15     mins  73734;  Therapeutic Exercise:    25/50     mins  86792;     Neuromuscular Mekhi:    0    mins  20625;    Therapeutic Activity:     0     mins  82311;       Timed Treatment:   40   mins   Total Treatment:     110   mins    Marge Childs, PT  [place] License # [id number]  Physical Therapist      "

## 2018-03-28 ENCOUNTER — TREATMENT (OUTPATIENT)
Dept: PHYSICAL THERAPY | Facility: CLINIC | Age: 78
End: 2018-03-28

## 2018-03-28 DIAGNOSIS — Z96.611 STATUS POST REVERSE TOTAL SHOULDER REPLACEMENT, RIGHT: Primary | ICD-10-CM

## 2018-03-28 PROCEDURE — G0283 ELEC STIM OTHER THAN WOUND: HCPCS | Performed by: PHYSICAL THERAPIST

## 2018-03-28 PROCEDURE — 97110 THERAPEUTIC EXERCISES: CPT | Performed by: PHYSICAL THERAPIST

## 2018-03-28 PROCEDURE — 97140 MANUAL THERAPY 1/> REGIONS: CPT | Performed by: PHYSICAL THERAPIST

## 2018-03-28 NOTE — PROGRESS NOTES
Physical Therapy Daily Progress Note    VISIT#: 22    Subjective   Bhumi Gimenez reports: that she continues to feel stronger. And has less pain.      Objective   Able to swing golf club with near normal back swing today.    See Exercise, Manual, and Modality Logs for complete treatment.     Patient Education: Cont HEP    Assessment/Plan  Motion more fluid and strength improving.  IR motion less painful now allowing for better ease with ADL's.  Nearing discharge.    Progress per Plan of Care  Plan discharge next week if no new symptoms arise         Manual Therapy:    16     mins  67632;  Therapeutic Exercise:    15/35     mins  53654;     Neuromuscular Mekhi:    0    mins  02640;    Therapeutic Activity:     0     mins  53952;       Timed Treatment:   31   mins   Total Treatment:     75   mins    Marge Childs, PT  [place] License # [id number]  Physical Therapist

## 2018-04-02 ENCOUNTER — TREATMENT (OUTPATIENT)
Dept: PHYSICAL THERAPY | Facility: CLINIC | Age: 78
End: 2018-04-02

## 2018-04-02 DIAGNOSIS — Z96.611 STATUS POST REVERSE TOTAL SHOULDER REPLACEMENT, RIGHT: Primary | ICD-10-CM

## 2018-04-02 PROCEDURE — 97140 MANUAL THERAPY 1/> REGIONS: CPT | Performed by: PHYSICAL THERAPIST

## 2018-04-02 PROCEDURE — 97110 THERAPEUTIC EXERCISES: CPT | Performed by: PHYSICAL THERAPIST

## 2018-04-02 NOTE — PROGRESS NOTES
Physical Therapy Daily Progress Note    VISIT#: 23    Subjective   Bhumi Gimenez reports: that she is very tired today as she had a crazy busy day at work.  Wants to only get stretched instead of performing all of her regular exercises.        Objective   Passive flexion to 150*    See Exercise, Manual, and Modality Logs for complete treatment.     Patient Education: cont HEP    Assessment/Plan  Patient very compliant with her HEP and is improving steadily.  Fatigued today from excessive use of arm at work.     Progress per Plan of Care           Manual Therapy:    16     mins  82299;  Therapeutic Exercise:    15     mins  36848;     Neuromuscular Mekhi:    0    mins  96428;    Therapeutic Activity:     0     mins  09173;       Timed Treatment:   31   mins   Total Treatment:     35   mins    Marge Childs, PT  [place] License # [id number]  Physical Therapist

## 2018-04-04 ENCOUNTER — TREATMENT (OUTPATIENT)
Dept: PHYSICAL THERAPY | Facility: CLINIC | Age: 78
End: 2018-04-04

## 2018-04-04 DIAGNOSIS — Z96.611 STATUS POST REVERSE TOTAL SHOULDER REPLACEMENT, RIGHT: Primary | ICD-10-CM

## 2018-04-04 PROCEDURE — 97140 MANUAL THERAPY 1/> REGIONS: CPT | Performed by: PHYSICAL THERAPIST

## 2018-04-04 PROCEDURE — 97110 THERAPEUTIC EXERCISES: CPT | Performed by: PHYSICAL THERAPIST

## 2018-04-04 NOTE — PROGRESS NOTES
Physical Therapy Daily Progress Note    VISIT#: 24    Subjective   Bhumi Gimenez reports: still having soreness at end range abduction and external rotation.      Objective   ER 75* today after stretch    See Exercise, Manual, and Modality Logs for complete treatment.     Patient Education: cont HEP    Assessment/Plan  Svetlana continues to show improvement in her active motion and function of the right shoulder post RTSA.  Nearing discharge.    Plan discharge next week if no new symptoms develop. Ensure comprehension of continued exercises           Manual Therapy:    16     mins  19072;  Therapeutic Exercise:    25/50     mins  44839;     Neuromuscular Mekhi:    0    mins  17940;    Therapeutic Activity:     0     mins  45802;       Timed Treatment:   41   mins   Total Treatment:     80   mins    Marge Childs, PT  [place] License # [id number]  Physical Therapist

## 2018-04-09 ENCOUNTER — TREATMENT (OUTPATIENT)
Dept: PHYSICAL THERAPY | Facility: CLINIC | Age: 78
End: 2018-04-09

## 2018-04-09 DIAGNOSIS — Z96.611 STATUS POST REVERSE TOTAL SHOULDER REPLACEMENT, RIGHT: Primary | ICD-10-CM

## 2018-04-09 PROCEDURE — 97530 THERAPEUTIC ACTIVITIES: CPT | Performed by: PHYSICAL THERAPIST

## 2018-04-09 PROCEDURE — 97140 MANUAL THERAPY 1/> REGIONS: CPT | Performed by: PHYSICAL THERAPIST

## 2018-04-09 NOTE — PROGRESS NOTES
MD Letter - Reassessment/Discharge Summary    Date of Initial Visit: Type: THERAPY  Noted: 12/19/2017  Today's Date: 4/9/2018  Patient seen for 25 sessions    Treatment has included: therapeutic exercise, neuromuscular re-education, manual therapy, electrical stimulation and cryotherapy    Subjective   Svetlana states that she only has pain when she reaches behind her body.  She states that she is able to swing a golf club without pain.  States that she feels like she is ready to be discharged to her HEP.    Objective   Shoulder AROM/AROM - Flexion 146/158, Abduction 124/155,  ER 62/73,  IR 78/84  Strength - shoulder flexion 4-/5, abduction 4-/5, ER 3+/5, IR 4/5  Sensation - intact  Palpation - minimal tenderness in the anterior joint line  Special tests - negative for instability in the joint   Activity tolerances - she is currently able to perform all of her normal activities with minimal to no difficulty.  She is able to swing a golf club and make contact with the ball without pain.  she is able to perform nearly all of the normal work tasks that she would do.    Assessment/Plan  Patient has demonstrated significant improvement since the initiation of therapy.  The pain has  Essentially resolved.  The motion has increased to a function level.  The activity tolerances have increased to near her normal activities.  I feel that the patient would benefit from continuing her HEP but stopping formal therapy as all of her goals have been met.  If you have any questions concerning the care, please do not hesitate to contact me.          PT Signature: Marge Childs, PT        Manual Therapy:    16     mins  40816;  Therapeutic Exercise:    15/50     mins  98604;     Neuromuscular Mekhi:    0    mins  00332;    Therapeutic Activity:     10     mins  81366;   Assessed for MD and Discussed progression of HEP.    Timed Treatment:   40   mins   Total Treatment:     75   mins

## 2018-05-02 ENCOUNTER — OFFICE VISIT (OUTPATIENT)
Dept: ORTHOPEDIC SURGERY | Facility: CLINIC | Age: 78
End: 2018-05-02

## 2018-05-02 VITALS — WEIGHT: 122 LBS | TEMPERATURE: 98.4 F | HEIGHT: 63 IN | BODY MASS INDEX: 21.62 KG/M2

## 2018-05-02 DIAGNOSIS — Z96.611 HISTORY OF RIGHT SHOULDER REPLACEMENT: Primary | ICD-10-CM

## 2018-05-02 PROCEDURE — 73030 X-RAY EXAM OF SHOULDER: CPT | Performed by: ORTHOPAEDIC SURGERY

## 2018-05-02 PROCEDURE — 99212 OFFICE O/P EST SF 10 MIN: CPT | Performed by: ORTHOPAEDIC SURGERY

## 2018-05-02 RX ORDER — OFLOXACIN 3 MG/ML
SOLUTION/ DROPS OPHTHALMIC
Refills: 0 | COMMUNITY
Start: 2018-04-19

## 2018-05-02 RX ORDER — FLUOROMETHOLONE 0.1 %
SUSPENSION, DROPS(FINAL DOSAGE FORM)(ML) OPHTHALMIC (EYE)
Refills: 3 | COMMUNITY
Start: 2018-04-27

## 2018-05-02 RX ORDER — PREDNISOLONE ACETATE 10 MG/ML
SUSPENSION/ DROPS OPHTHALMIC
Refills: 0 | Status: ON HOLD | COMMUNITY
Start: 2018-04-19 | End: 2021-05-12

## 2018-05-06 NOTE — PROGRESS NOTES
Chief Complaint:  Follow-up status post right reverse total shoulder arthroplasty    HPI:  Ms. Gimenez is now 6 months out from her right reverse total shoulder.  She tells me it seems to be doing well.  She does report some subjective tightness in the shoulder but no significant pain.  She is very happy with her outcome.    Exam:  Incision is healed.  Forward elevation 160°, external rotation 40°, internal rotation to L2.  Good strength with elevation and abduction.  No pain on exam today.    Imaging:  AP, scapular Y and axillary views of the right shoulder are ordered, reviewed, and compared to previous x-rays.  The implants appear well fixed and well positioned.  No complicating process noted.    Assessment:  Six-month status post right reverse total shoulder arthroplasty    Plan:  She is doing well.  She can follow-up annually.  We discussed appropriate activity modifications and restrictions as well as antibiotic prophylaxis for dental procedures.    Sabino Harley MD  05/02/2018

## 2019-06-24 ENCOUNTER — OFFICE VISIT (OUTPATIENT)
Dept: ORTHOPEDIC SURGERY | Facility: CLINIC | Age: 79
End: 2019-06-24

## 2019-06-24 DIAGNOSIS — Z96.611 HISTORY OF RIGHT SHOULDER REPLACEMENT: Primary | ICD-10-CM

## 2019-06-24 PROCEDURE — 73030 X-RAY EXAM OF SHOULDER: CPT | Performed by: NURSE PRACTITIONER

## 2019-06-24 PROCEDURE — 99212 OFFICE O/P EST SF 10 MIN: CPT | Performed by: NURSE PRACTITIONER

## 2019-06-25 NOTE — PROGRESS NOTES
Bhumi Gimenez : 1940 MRN: [id number] DATE: 2019      DIAGNOSIS: Annual follow up right shoulder arthroplasty      SUBJECTIVE:Patient returns today for  2 year follow up of right shoulder replacement. Patient reports doing well with no unusual complaints. Denies any limitations due to the shoulder.    OBJECTIVE:    There were no vitals taken for this visit.  Family History   Problem Relation Age of Onset   • Alcohol abuse Mother    • Other Mother         CORONARY ARTERIOSCLEROSIS   • Cancer Father         prostate   • Malig Hyperthermia Neg Hx      Past Medical History:   Diagnosis Date   • Anemia of chronic disorder    • Aortic sclerosis    • Cancer (CMS/HCC)     SKIN CANCER REMOVED FROM LEFT KNEE   • Chronic deep venous thrombosis (CMS/HCC)     chronic bilateral DVT   • Chronic kidney disease    • Chronic pancreatitis (CMS/HCC)     ACUTE PANCREATITIS 2013   • Diverticulosis    • Esophageal reflux    • Full dentures    • Hepatomegaly     LIKELY SECONDARY TO ALCOHOL USE   • History of alcohol use     quit    • History of transfusion    • Hypertension    • Left foot drop     LEFT FOOD DROP SECONDARY TO PERONEAL NERVE INJURY   • Osteoarthritis     OSTEO   • PAF (paroxysmal atrial fibrillation) (CMS/HCC)    • Peripheral neuropathy     LEFT LOWER EXTREMITY   • Personal history of gallstones    • Personal history of malignant neoplasm of skin     S/P REMOVAL FROM LEFT KNEE   • Takotsubo syndrome     Likely secondary to acute illness in .  EF as low as 20-25% 13.  EF 63% by echo 14.   • Thrombocytopenia (CMS/HCC)      Past Surgical History:   Procedure Laterality Date   • CHOLECYSTECTOMY     • CORNEAL TRANSPLANT     • MULTIPLE TOOTH EXTRACTIONS      FULL MOUTH TEETH REMOVED   • TOTAL SHOULDER ARTHROPLASTY W/ DISTAL CLAVICLE EXCISION Right 2017    Procedure: Reverse Total Shoulder Arthroplasty;  Surgeon: Sabino Harley MD;  Location: [facility];  Service:      Social  History     Socioeconomic History   • Marital status: Single     Spouse name: Not on file   • Number of children: Not on file   • Years of education: Not on file   • Highest education level: Not on file   Tobacco Use   • Smoking status: Never Smoker   • Smokeless tobacco: Never Used   Substance and Sexual Activity   • Alcohol use: No   • Drug use: No   • Sexual activity: Defer     Review of Systems - Negative except as listed above.    Exam:. The incision is well healed. No effusion.  Range of motion is measured at 175° forward elevation, 75° external rotation, internal rotation to T10.  The arm is soft and nontender.  Good strength with elevation and abduction. Intact to light touch with palpable distal pulses.     DIAGNOSTIC STUDIES  Xrays: 3 views of the right shoulder (AP, scapular Y and axillary) were ordered and reviewed for evaluation of shoulder replacement. They demonstrate a well positioned, well aligned replacement without complicating factors noted. In comparison with previous films there has been no change.    ASSESSMENT: Annual follow up right shoulder replacement.    PLAN:  Continue activities as tolerated.  Going forward, she will follow-up as needed.    Meri De Jesus, ANTONY    06/24/2019

## 2020-01-16 ENCOUNTER — OFFICE VISIT (OUTPATIENT)
Dept: CARDIOLOGY | Facility: CLINIC | Age: 80
End: 2020-01-16

## 2020-01-16 VITALS
WEIGHT: 110.2 LBS | HEIGHT: 64 IN | HEART RATE: 63 BPM | BODY MASS INDEX: 18.82 KG/M2 | DIASTOLIC BLOOD PRESSURE: 78 MMHG | SYSTOLIC BLOOD PRESSURE: 128 MMHG

## 2020-01-16 DIAGNOSIS — I48.0 PAROXYSMAL ATRIAL FIBRILLATION (HCC): Primary | ICD-10-CM

## 2020-01-16 DIAGNOSIS — I51.81 TAKOTSUBO CARDIOMYOPATHY: ICD-10-CM

## 2020-01-16 PROCEDURE — 93000 ELECTROCARDIOGRAM COMPLETE: CPT | Performed by: INTERNAL MEDICINE

## 2020-01-16 PROCEDURE — 99204 OFFICE O/P NEW MOD 45 MIN: CPT | Performed by: INTERNAL MEDICINE

## 2020-02-24 NOTE — PROGRESS NOTES
Date of Office Visit: 2020  Encounter Provider: Parish Subramanian MD  Place of Service: [facility] CARDIOLOGY  Patient Name: Bhumi Gimenez  :1940    Chief complaint: Follow-up for stress-induced cardiomyopathy, paroxysmal atrial   fibrillation.    History of Present Illness:    Dear Dr. Cheatham,     I again had the pleasure of seeing your patient in cardiology office on 2020.  As  you well know, she is a very pleasant 79 year-old white female with a past medical history  significant for multiple medical problems who presents for follow-up regarding  stress-induced cardiomyopathy and paroxysmal atrial fibrillation.  The patient has a very  complicated past medical history.  She was admitted on 2013 with acute on chronic  pancreatitis.  She does have a history of drinking 3-4 alcoholic beverages per day.  She  subsequently developed multiple medical issues while hospitalized, including acute on  chronic kidney disease, respiratory failure, and atrial fibrillation with rapid  ventricular response.  During her evaluation for the paroxysmal atrial fibrillation, she  had an echocardiogram performed on 2013 which showed an ejection fraction of  60%-65%.  However, on 2013, the patient developed severe EKG changes which   were concerning for a possible anterior myocardial infarction.  She also was noted to   have an elevated troponin at that time.  A follow up echocardiogram on 2013   showed an ejection fraction of 20%-25%.  The decision was made not to proceed with   cardiac cath secondary to significant thrombocytopenia and kidney injury.  However, the  patient's ejection fraction improved on 12/3/2013 and was noted to be 50%-55%.   Therefore, it was felt that she likely had a stress induced cardiomyopathy secondary to  her other acute illnesses.  She was not anticoagulated for her atrial fibrillation  secondary to significant thrombocytopenia.        The patient presents today for follow-up.  Although I followed her in the past, she has   not been seen in the office since 2015.  She is here mainly to get reestablished today.    She denies any specific complaints, and states that she only gets short of breath with   moderate activity.  This has been unchanged.  She has had no chest pain.  She   denied any palpitations or known atrial fibrillation.  She has not been on any   anticoagulation.  She did develop significant bleeding and bruising with aspirin, and   actually discontinued this as well.    Past Medical History:   Diagnosis Date   • Anemia of chronic disorder    • Aortic sclerosis    • Apical ballooning syndrome    • Bilateral edema of lower extremity    • Biliary dyskinesia    • Cancer (CMS/HCC)     SKIN CANCER REMOVED FROM LEFT KNEE   • Chronic deep venous thrombosis (CMS/HCC)     chronic bilateral DVT   • Chronic kidney disease    • Chronic pancreatitis (CMS/HCC)     ACUTE PANCREATITIS 11/20/2013   • Diverticulosis    • Esophageal reflux    • Full dentures    • Hepatomegaly     LIKELY SECONDARY TO ALCOHOL USE   • History of alcohol use     quit 11/13   • History of transfusion    • Hypertension    • IBS (irritable bowel syndrome)     with diarrhea   • Left foot drop     LEFT FOOD DROP SECONDARY TO PERONEAL NERVE INJURY   • Osteoarthritis     OSTEO   • PAF (paroxysmal atrial fibrillation) (CMS/HCC)    • Peripheral neuropathy     LEFT LOWER EXTREMITY   • Personal history of gallstones    • Personal history of malignant neoplasm of skin     S/P REMOVAL FROM LEFT KNEE   • Primary osteoarthritis of both hands    • Takotsubo syndrome     Likely secondary to acute illness in 11/13.  EF as low as 20-25% 11/27/13.  EF 63% by echo 4/14/14.   • Thrombocytopenia (CMS/HCC)        Past Surgical History:   Procedure Laterality Date   • CHOLECYSTECTOMY     • CORNEAL TRANSPLANT     • MULTIPLE TOOTH EXTRACTIONS      FULL MOUTH TEETH REMOVED   • TOTAL SHOULDER  ARTHROPLASTY W/ DISTAL CLAVICLE EXCISION Right 11/16/2017    Procedure: Reverse Total Shoulder Arthroplasty;  Surgeon: Sabino Harley MD;  Location: [facility] OR;  Service:        Current Outpatient Medications on File Prior to Visit   Medication Sig Dispense Refill   • carvedilol (COREG) 12.5 MG tablet Take 1 tablet by mouth 2 (two) times a day. 180 tablet 3   • ferrous sulfate 325 (65 FE) MG EC tablet Take 325 mg by mouth Daily With Breakfast.     • fluorometholone (FML) 0.1 % ophthalmic suspension SHAKE GENTLY AND INSTILL ONE DROP IN RIGHT EYE BID.  3   • furosemide (LASIX) 20 MG tablet Take 1 tablet by mouth 2 (two) times a day. 180 tablet 3   • ibuprofen (ADVIL,MOTRIN) 800 MG tablet Take 1 tablet by mouth 3 (Three) Times a Day. 90 tablet 2   • losartan (COZAAR) 50 MG tablet Take 1 tablet by mouth daily. 90 tablet 3   • ofloxacin (OCUFLOX) 0.3 % ophthalmic solution INSTILL 1 DROP INTO RIGHT EYE 3 TIMES A DAY FOR 1 WEEK  0   • pancrelipase, Lip-Prot-Amyl, (CREON) 27170 UNITS capsule delayed-release particles capsule Take 2 capsules by mouth 3 (Three) Times a Day With Meals. 540 capsule 1   • pantoprazole (PROTONIX) 40 MG EC tablet Take 1 tablet by mouth daily. 90 tablet 3   • prednisoLONE acetate (PRED FORTE) 1 % ophthalmic suspension INSTILL 1 DROP IN BOTH EYES 4 TIMES A DAY FOR 1 WEEK THEN TWO TIMES A DAY FOR 1 WEEK  0   • aspirin 81 MG EC tablet Take 1 tablet by mouth Daily. Resume 11/19/17     • potassium chloride ER (K-TAB) 20 MEQ tablet controlled-release ER tablet Take 1 tablet by mouth Daily. 90 tablet 1     No current facility-administered medications on file prior to visit.      Allergies as of 01/16/2020   • (No Known Allergies)     Social History     Socioeconomic History   • Marital status: Single     Spouse name: Not on file   • Number of children: Not on file   • Years of education: Not on file   • Highest education level: Not on file   Tobacco Use   • Smoking status: Never Smoker   • Smokeless  "tobacco: Never Used   Substance and Sexual Activity   • Alcohol use: No   • Drug use: No   • Sexual activity: Defer     Family History   Problem Relation Age of Onset   • Alcohol abuse Mother    • Other Mother         CORONARY ARTERIOSCLEROSIS   • Cancer Father         prostate   • Malig Hyperthermia Neg Hx        Review of Systems   Respiratory: Positive for shortness of breath.    All other systems reviewed and are negative.     Objective:     Vitals:    01/16/20 0939   BP: 128/78   Pulse: 63   Weight: 50 kg (110 lb 3.2 oz)   Height: 161.3 cm (63.5\")     Body mass index is 19.21 kg/m².    Physical Exam   Constitutional: She is oriented to person, place, and time. She appears well-developed and well-nourished.   HENT:   Head: Normocephalic and atraumatic.   Eyes: Conjunctivae are normal.   Neck: Neck supple.   Cardiovascular: Normal rate and regular rhythm. Exam reveals no gallop and no friction rub.   No murmur heard.  Pulmonary/Chest: Effort normal and breath sounds normal.   Abdominal: Soft. There is no tenderness.   Musculoskeletal: She exhibits no edema.   Neurological: She is alert and oriented to person, place, and time.   Skin: Skin is warm.   Psychiatric: She has a normal mood and affect. Her behavior is normal.     Lab Review:     ECG 12 Lead  Date/Time: 1/16/2020 9:41 AM  Performed by: Parish Subramanian MD  Authorized by: Parish Subramanian MD   Comparison: compared with previous ECG from 11/8/2017  Similar to previous ECG  Rhythm: sinus rhythm  Rate: normal  BPM: 63    Clinical impression: normal ECG            Assessment:       Diagnosis Plan   1. Paroxysmal atrial fibrillation (CMS/HCC)     2. Takotsubo cardiomyopathy       Plan:       She seems to be stable at this point.  Again, she was never anticoagulated secondary to thrombocytopenia.  She also only had atrial fibrillation documented when she had her acute illness and the pancreatitis.  She has not had any palpitations or symptoms consistent " with atrial fibrillation.  She also developed significant bleeding and bruising with 81 mg of aspirin per day.  I would not anticoagulate her currently.  She will continue on the carvedilol at 12.5 mg twice a day.  Her blood pressure is good at 128/78.  She does not have any signs or symptoms of congestive heart failure, and I suspect her ejection fraction is still normal.  I will follow her on a yearly basis unless other issues arise.

## 2021-02-09 ENCOUNTER — HOSPITAL ENCOUNTER (OUTPATIENT)
Dept: GENERAL RADIOLOGY | Facility: HOSPITAL | Age: 81
Discharge: HOME | End: 2021-02-09
Admitting: INTERNAL MEDICINE

## 2021-02-09 DIAGNOSIS — M79.641 BILATERAL HAND PAIN: ICD-10-CM

## 2021-02-09 DIAGNOSIS — M79.642 BILATERAL HAND PAIN: ICD-10-CM

## 2021-02-09 PROCEDURE — 73130 X-RAY EXAM OF HAND: CPT

## 2021-05-12 ENCOUNTER — HOSPITAL ENCOUNTER (INPATIENT)
Facility: HOSPITAL | Age: 81
LOS: 2 days | Discharge: HOME | End: 2021-05-14
Attending: INTERNAL MEDICINE | Admitting: INTERNAL MEDICINE

## 2021-05-12 PROBLEM — M05.79 RHEUMATOID ARTHRITIS OF MULTIPLE SITES WITHOUT ORGAN OR SYSTEM INVOLVEMENT WITH POSITIVE RHEUMATOID FACTOR: Status: ACTIVE | Noted: 2021-05-12

## 2021-05-12 PROBLEM — R06.02 SHORTNESS OF BREATH ON EXERTION: Status: ACTIVE | Noted: 2021-05-12

## 2021-05-12 PROBLEM — R07.89 INTERMITTENT RIGHT-SIDED CHEST PAIN: Status: ACTIVE | Noted: 2021-05-12

## 2021-05-12 LAB
ALBUMIN SERPL-MCNC: 3.9 G/DL (ref 3.5–5.2)
ALBUMIN/GLOB SERPL: 1.9 G/DL
ALP SERPL-CCNC: 59 U/L (ref 39–117)
ALT SERPL W P-5'-P-CCNC: 11 U/L (ref 1–33)
AMYLASE SERPL-CCNC: 66 U/L (ref 28–100)
ANION GAP SERPL CALCULATED.3IONS-SCNC: 8.9 MMOL/L (ref 5–15)
AST SERPL-CCNC: 18 U/L (ref 1–32)
BASOPHILS # BLD AUTO: 0.04 10*3/MM3 (ref 0–0.2)
BASOPHILS NFR BLD AUTO: 0.4 % (ref 0–1.5)
BILIRUB SERPL-MCNC: 0.3 MG/DL (ref 0–1.2)
BUN SERPL-MCNC: 12 MG/DL (ref 8–23)
BUN/CREAT SERPL: 14.1 (ref 7–25)
CALCIUM SPEC-SCNC: 8.6 MG/DL (ref 8.6–10.5)
CHLORIDE SERPL-SCNC: 99 MMOL/L (ref 98–107)
CHOLEST SERPL-MCNC: 157 MG/DL (ref 0–200)
CO2 SERPL-SCNC: 26.1 MMOL/L (ref 22–29)
CREAT SERPL-MCNC: 0.85 MG/DL (ref 0.57–1)
D DIMER PPP FEU-MCNC: <0.27 MCGFEU/ML (ref 0–0.49)
DEPRECATED RDW RBC AUTO: 43.6 FL (ref 37–54)
EOSINOPHIL # BLD AUTO: 0.08 10*3/MM3 (ref 0–0.4)
EOSINOPHIL NFR BLD AUTO: 0.7 % (ref 0.3–6.2)
ERYTHROCYTE [DISTWIDTH] IN BLOOD BY AUTOMATED COUNT: 12.7 % (ref 12.3–15.4)
GFR SERPL CREATININE-BSD FRML MDRD: 64 ML/MIN/1.73
GLOBULIN UR ELPH-MCNC: 2.1 GM/DL
GLUCOSE SERPL-MCNC: 118 MG/DL (ref 65–99)
HCT VFR BLD AUTO: 33.8 % (ref 34–46.6)
HDLC SERPL-MCNC: 75 MG/DL (ref 40–60)
HGB BLD-MCNC: 11.5 G/DL (ref 12–15.9)
IMM GRANULOCYTES # BLD AUTO: 0.04 10*3/MM3 (ref 0–0.05)
IMM GRANULOCYTES NFR BLD AUTO: 0.4 % (ref 0–0.5)
LDLC SERPL CALC-MCNC: 70 MG/DL (ref 0–100)
LDLC/HDLC SERPL: 0.94 {RATIO}
LIPASE SERPL-CCNC: 61 U/L (ref 13–60)
LYMPHOCYTES # BLD AUTO: 0.85 10*3/MM3 (ref 0.7–3.1)
LYMPHOCYTES NFR BLD AUTO: 7.6 % (ref 19.6–45.3)
MAGNESIUM SERPL-MCNC: 2 MG/DL (ref 1.6–2.4)
MCH RBC QN AUTO: 31.9 PG (ref 26.6–33)
MCHC RBC AUTO-ENTMCNC: 34 G/DL (ref 31.5–35.7)
MCV RBC AUTO: 93.9 FL (ref 79–97)
MONOCYTES # BLD AUTO: 0.65 10*3/MM3 (ref 0.1–0.9)
MONOCYTES NFR BLD AUTO: 5.8 % (ref 5–12)
NEUTROPHILS NFR BLD AUTO: 85.1 % (ref 42.7–76)
NEUTROPHILS NFR BLD AUTO: 9.48 10*3/MM3 (ref 1.7–7)
NRBC BLD AUTO-RTO: 0 /100 WBC (ref 0–0.2)
NT-PROBNP SERPL-MCNC: 2888 PG/ML (ref 0–1800)
PHOSPHATE SERPL-MCNC: 3.6 MG/DL (ref 2.5–4.5)
PLATELET # BLD AUTO: 206 10*3/MM3 (ref 140–450)
PMV BLD AUTO: 10.1 FL (ref 6–12)
POTASSIUM SERPL-SCNC: 3.7 MMOL/L (ref 3.5–5.2)
PROT SERPL-MCNC: 6 G/DL (ref 6–8.5)
QT INTERVAL: 302 MS
RBC # BLD AUTO: 3.6 10*6/MM3 (ref 3.77–5.28)
SODIUM SERPL-SCNC: 134 MMOL/L (ref 136–145)
TRIGL SERPL-MCNC: 56 MG/DL (ref 0–150)
TROPONIN T SERPL-MCNC: <0.01 NG/ML (ref 0–0.03)
TSH SERPL DL<=0.05 MIU/L-ACNC: 2.1 UIU/ML (ref 0.27–4.2)
VLDLC SERPL-MCNC: 12 MG/DL (ref 5–40)
WBC # BLD AUTO: 11.14 10*3/MM3 (ref 3.4–10.8)

## 2021-05-12 PROCEDURE — 93010 ELECTROCARDIOGRAM REPORT: CPT | Performed by: INTERNAL MEDICINE

## 2021-05-12 PROCEDURE — 84100 ASSAY OF PHOSPHORUS: CPT | Performed by: INTERNAL MEDICINE

## 2021-05-12 PROCEDURE — 85379 FIBRIN DEGRADATION QUANT: CPT | Performed by: INTERNAL MEDICINE

## 2021-05-12 PROCEDURE — 82150 ASSAY OF AMYLASE: CPT | Performed by: INTERNAL MEDICINE

## 2021-05-12 PROCEDURE — 84484 ASSAY OF TROPONIN QUANT: CPT | Performed by: INTERNAL MEDICINE

## 2021-05-12 PROCEDURE — 84443 ASSAY THYROID STIM HORMONE: CPT | Performed by: INTERNAL MEDICINE

## 2021-05-12 PROCEDURE — 83690 ASSAY OF LIPASE: CPT | Performed by: INTERNAL MEDICINE

## 2021-05-12 PROCEDURE — 85025 COMPLETE CBC W/AUTO DIFF WBC: CPT | Performed by: INTERNAL MEDICINE

## 2021-05-12 PROCEDURE — 83735 ASSAY OF MAGNESIUM: CPT | Performed by: INTERNAL MEDICINE

## 2021-05-12 PROCEDURE — U0004 COV-19 TEST NON-CDC HGH THRU: HCPCS | Performed by: INTERNAL MEDICINE

## 2021-05-12 PROCEDURE — 93005 ELECTROCARDIOGRAM TRACING: CPT | Performed by: INTERNAL MEDICINE

## 2021-05-12 PROCEDURE — U0005 INFEC AGEN DETEC AMPLI PROBE: HCPCS | Performed by: INTERNAL MEDICINE

## 2021-05-12 PROCEDURE — 83880 ASSAY OF NATRIURETIC PEPTIDE: CPT | Performed by: INTERNAL MEDICINE

## 2021-05-12 PROCEDURE — 80053 COMPREHEN METABOLIC PANEL: CPT | Performed by: INTERNAL MEDICINE

## 2021-05-12 PROCEDURE — 80061 LIPID PANEL: CPT | Performed by: INTERNAL MEDICINE

## 2021-05-12 RX ORDER — FERROUS SULFATE 325(65) MG
325 TABLET ORAL 2 TIMES DAILY WITH MEALS
Status: DISCONTINUED | OUTPATIENT
Start: 2021-05-12 | End: 2021-05-14 | Stop reason: HOSPADM

## 2021-05-12 RX ORDER — FUROSEMIDE 20 MG/1
20 TABLET ORAL DAILY
Status: DISCONTINUED | OUTPATIENT
Start: 2021-05-12 | End: 2021-05-14 | Stop reason: HOSPADM

## 2021-05-12 RX ORDER — NITROGLYCERIN 0.4 MG/1
0.4 TABLET SUBLINGUAL
Status: DISCONTINUED | OUTPATIENT
Start: 2021-05-12 | End: 2021-05-14 | Stop reason: HOSPADM

## 2021-05-12 RX ORDER — POLYETHYLENE GLYCOL 3350 17 G/17G
17 POWDER, FOR SOLUTION ORAL DAILY PRN
Status: DISCONTINUED | OUTPATIENT
Start: 2021-05-12 | End: 2021-05-14 | Stop reason: HOSPADM

## 2021-05-12 RX ORDER — SODIUM CHLORIDE 0.9 % (FLUSH) 0.9 %
3-10 SYRINGE (ML) INJECTION AS NEEDED
Status: DISCONTINUED | OUTPATIENT
Start: 2021-05-12 | End: 2021-05-14 | Stop reason: HOSPADM

## 2021-05-12 RX ORDER — CARVEDILOL 12.5 MG/1
12.5 TABLET ORAL 2 TIMES DAILY WITH MEALS
Status: DISCONTINUED | OUTPATIENT
Start: 2021-05-12 | End: 2021-05-13

## 2021-05-12 RX ORDER — OFLOXACIN 3 MG/ML
1 SOLUTION/ DROPS OPHTHALMIC 3 TIMES DAILY
Status: DISCONTINUED | OUTPATIENT
Start: 2021-05-12 | End: 2021-05-14 | Stop reason: HOSPADM

## 2021-05-12 RX ORDER — CHOLECALCIFEROL (VITAMIN D3) 125 MCG
5 CAPSULE ORAL NIGHTLY PRN
Status: DISCONTINUED | OUTPATIENT
Start: 2021-05-12 | End: 2021-05-14 | Stop reason: HOSPADM

## 2021-05-12 RX ORDER — LOSARTAN POTASSIUM 50 MG/1
50 TABLET ORAL DAILY
Status: DISCONTINUED | OUTPATIENT
Start: 2021-05-13 | End: 2021-05-13

## 2021-05-12 RX ORDER — FLUOROMETHOLONE 0.1 %
1 SUSPENSION, DROPS(FINAL DOSAGE FORM)(ML) OPHTHALMIC (EYE) 2 TIMES DAILY
Status: DISCONTINUED | OUTPATIENT
Start: 2021-05-12 | End: 2021-05-14 | Stop reason: HOSPADM

## 2021-05-12 RX ORDER — PANTOPRAZOLE SODIUM 40 MG/1
40 TABLET, DELAYED RELEASE ORAL DAILY
Status: DISCONTINUED | OUTPATIENT
Start: 2021-05-12 | End: 2021-05-14 | Stop reason: HOSPADM

## 2021-05-12 RX ORDER — ACETAMINOPHEN 650 MG/1
650 SUPPOSITORY RECTAL EVERY 4 HOURS PRN
Status: DISCONTINUED | OUTPATIENT
Start: 2021-05-12 | End: 2021-05-14 | Stop reason: HOSPADM

## 2021-05-12 RX ORDER — ACETAMINOPHEN 325 MG/1
650 TABLET ORAL EVERY 4 HOURS PRN
Status: DISCONTINUED | OUTPATIENT
Start: 2021-05-12 | End: 2021-05-14 | Stop reason: HOSPADM

## 2021-05-12 RX ORDER — AMOXICILLIN 250 MG
2 CAPSULE ORAL 2 TIMES DAILY
Status: DISCONTINUED | OUTPATIENT
Start: 2021-05-12 | End: 2021-05-14 | Stop reason: HOSPADM

## 2021-05-12 RX ORDER — KETOROLAC TROMETHAMINE 30 MG/ML
30 INJECTION, SOLUTION INTRAMUSCULAR; INTRAVENOUS EVERY 6 HOURS PRN
Status: DISCONTINUED | OUTPATIENT
Start: 2021-05-12 | End: 2021-05-13

## 2021-05-12 RX ORDER — BISACODYL 10 MG
10 SUPPOSITORY, RECTAL RECTAL DAILY PRN
Status: DISCONTINUED | OUTPATIENT
Start: 2021-05-12 | End: 2021-05-14 | Stop reason: HOSPADM

## 2021-05-12 RX ORDER — POTASSIUM CHLORIDE 750 MG/1
20 TABLET, FILM COATED, EXTENDED RELEASE ORAL DAILY
Refills: 1 | Status: DISCONTINUED | OUTPATIENT
Start: 2021-05-13 | End: 2021-05-14 | Stop reason: HOSPADM

## 2021-05-12 RX ORDER — ONDANSETRON 2 MG/ML
4 INJECTION INTRAMUSCULAR; INTRAVENOUS EVERY 6 HOURS PRN
Status: DISCONTINUED | OUTPATIENT
Start: 2021-05-12 | End: 2021-05-14 | Stop reason: HOSPADM

## 2021-05-12 RX ORDER — CALCIUM CARBONATE 200(500)MG
2 TABLET,CHEWABLE ORAL 3 TIMES DAILY PRN
Status: DISCONTINUED | OUTPATIENT
Start: 2021-05-12 | End: 2021-05-14 | Stop reason: HOSPADM

## 2021-05-12 RX ORDER — SODIUM CHLORIDE 0.9 % (FLUSH) 0.9 %
3 SYRINGE (ML) INJECTION EVERY 12 HOURS SCHEDULED
Status: DISCONTINUED | OUTPATIENT
Start: 2021-05-12 | End: 2021-05-14 | Stop reason: HOSPADM

## 2021-05-12 RX ORDER — BISACODYL 5 MG/1
5 TABLET, DELAYED RELEASE ORAL DAILY PRN
Status: DISCONTINUED | OUTPATIENT
Start: 2021-05-12 | End: 2021-05-14 | Stop reason: HOSPADM

## 2021-05-12 RX ORDER — ACETAMINOPHEN 160 MG/5ML
650 SOLUTION ORAL EVERY 4 HOURS PRN
Status: DISCONTINUED | OUTPATIENT
Start: 2021-05-12 | End: 2021-05-14 | Stop reason: HOSPADM

## 2021-05-12 RX ADMIN — FLUOROMETHOLONE 1 DROP: 1 SOLUTION/ DROPS OPHTHALMIC at 21:46

## 2021-05-12 RX ADMIN — CARVEDILOL 12.5 MG: 12.5 TABLET, FILM COATED ORAL at 21:47

## 2021-05-12 RX ADMIN — SODIUM CHLORIDE, PRESERVATIVE FREE 3 ML: 5 INJECTION INTRAVENOUS at 21:50

## 2021-05-12 RX ADMIN — DOCUSATE SODIUM 50MG AND SENNOSIDES 8.6MG 2 TABLET: 8.6; 5 TABLET, FILM COATED ORAL at 21:47

## 2021-05-12 RX ADMIN — FERROUS SULFATE TAB 325 MG (65 MG ELEMENTAL FE) 325 MG: 325 (65 FE) TAB at 21:47

## 2021-05-12 RX ADMIN — PANTOPRAZOLE SODIUM 40 MG: 40 TABLET, DELAYED RELEASE ORAL at 21:47

## 2021-05-12 RX ADMIN — FUROSEMIDE 20 MG: 20 TABLET ORAL at 21:47

## 2021-05-13 ENCOUNTER — APPOINTMENT (OUTPATIENT)
Dept: CARDIOLOGY | Facility: HOSPITAL | Age: 81
End: 2021-05-13

## 2021-05-13 ENCOUNTER — APPOINTMENT (OUTPATIENT)
Dept: CT IMAGING | Facility: HOSPITAL | Age: 81
End: 2021-05-13

## 2021-05-13 PROBLEM — I35.0 NONRHEUMATIC AORTIC VALVE STENOSIS: Status: ACTIVE | Noted: 2021-05-13

## 2021-05-13 LAB
ANION GAP SERPL CALCULATED.3IONS-SCNC: 9.9 MMOL/L (ref 5–15)
BASOPHILS # BLD AUTO: 0.06 10*3/MM3 (ref 0–0.2)
BASOPHILS NFR BLD AUTO: 0.7 % (ref 0–1.5)
BH CV ECHO AV AORTIC VALVE AT ACCEL TIME CALCULATED: 70 MSEC
BH CV ECHO MEAS - AO ACC TIME: 0.07 SEC
BH CV ECHO MEAS - AO MAX PG (FULL): 20.6 MMHG
BH CV ECHO MEAS - AO MAX PG: 27 MMHG
BH CV ECHO MEAS - AO MEAN PG (FULL): 9 MMHG
BH CV ECHO MEAS - AO MEAN PG: 12 MMHG
BH CV ECHO MEAS - AO ROOT AREA (BSA CORRECTED): 2.1
BH CV ECHO MEAS - AO ROOT AREA: 7.5 CM^2
BH CV ECHO MEAS - AO ROOT DIAM: 3.1 CM
BH CV ECHO MEAS - AO V2 MAX: 260 CM/SEC
BH CV ECHO MEAS - AO V2 MEAN: 158 CM/SEC
BH CV ECHO MEAS - AO V2 VTI: 39.4 CM
BH CV ECHO MEAS - ASC AORTA: 2.9 CM
BH CV ECHO MEAS - AT: 0.07 SEC
BH CV ECHO MEAS - AVA(I,A): 1.4 CM^2
BH CV ECHO MEAS - AVA(I,D): 1.4 CM^2
BH CV ECHO MEAS - AVA(V,A): 1.5 CM^2
BH CV ECHO MEAS - AVA(V,D): 1.5 CM^2
BH CV ECHO MEAS - BSA(HAYCOCK): 1.5 M^2
BH CV ECHO MEAS - BSA: 1.5 M^2
BH CV ECHO MEAS - BZI_BMI: 19 KILOGRAMS/M^2
BH CV ECHO MEAS - BZI_METRIC_HEIGHT: 160 CM
BH CV ECHO MEAS - BZI_METRIC_WEIGHT: 48.5 KG
BH CV ECHO MEAS - EDV(CUBED): 91.1 ML
BH CV ECHO MEAS - EDV(MOD-SP2): 37 ML
BH CV ECHO MEAS - EDV(MOD-SP4): 36 ML
BH CV ECHO MEAS - EDV(TEICH): 92.4 ML
BH CV ECHO MEAS - EF(CUBED): 60.6 %
BH CV ECHO MEAS - EF(MOD-BP): 52.5 %
BH CV ECHO MEAS - EF(MOD-SP2): 54.1 %
BH CV ECHO MEAS - EF(MOD-SP4): 50 %
BH CV ECHO MEAS - EF(TEICH): 52.3 %
BH CV ECHO MEAS - ESV(CUBED): 35.9 ML
BH CV ECHO MEAS - ESV(MOD-SP2): 17 ML
BH CV ECHO MEAS - ESV(MOD-SP4): 18 ML
BH CV ECHO MEAS - ESV(TEICH): 44.1 ML
BH CV ECHO MEAS - FS: 26.7 %
BH CV ECHO MEAS - IVS/LVPW: 1.1
BH CV ECHO MEAS - IVSD: 1 CM
BH CV ECHO MEAS - LV DIASTOLIC VOL/BSA (35-75): 24.3 ML/M^2
BH CV ECHO MEAS - LV MASS(C)D: 142.9 GRAMS
BH CV ECHO MEAS - LV MASS(C)DI: 96.4 GRAMS/M^2
BH CV ECHO MEAS - LV MAX PG: 6.5 MMHG
BH CV ECHO MEAS - LV MEAN PG: 3 MMHG
BH CV ECHO MEAS - LV SYSTOLIC VOL/BSA (12-30): 12.1 ML/M^2
BH CV ECHO MEAS - LV V1 MAX: 127 CM/SEC
BH CV ECHO MEAS - LV V1 MEAN: 76.7 CM/SEC
BH CV ECHO MEAS - LV V1 VTI: 17 CM
BH CV ECHO MEAS - LVIDD: 4.5 CM
BH CV ECHO MEAS - LVIDS: 3.3 CM
BH CV ECHO MEAS - LVLD AP2: 6.2 CM
BH CV ECHO MEAS - LVLD AP4: 5.8 CM
BH CV ECHO MEAS - LVLS AP2: 5.4 CM
BH CV ECHO MEAS - LVLS AP4: 5.3 CM
BH CV ECHO MEAS - LVOT AREA (M): 3.1 CM^2
BH CV ECHO MEAS - LVOT AREA: 3.1 CM^2
BH CV ECHO MEAS - LVOT DIAM: 2 CM
BH CV ECHO MEAS - LVPWD: 0.9 CM
BH CV ECHO MEAS - MV DEC SLOPE: 579 CM/SEC^2
BH CV ECHO MEAS - MV DEC TIME: 158 SEC
BH CV ECHO MEAS - MV E MAX VEL: 90 CM/SEC
BH CV ECHO MEAS - MV MAX PG: 4.4 MMHG
BH CV ECHO MEAS - MV MEAN PG: 1 MMHG
BH CV ECHO MEAS - MV P1/2T MAX VEL: 106 CM/SEC
BH CV ECHO MEAS - MV P1/2T: 53.6 MSEC
BH CV ECHO MEAS - MV V2 MAX: 105 CM/SEC
BH CV ECHO MEAS - MV V2 MEAN: 51.1 CM/SEC
BH CV ECHO MEAS - MV V2 VTI: 16.1 CM
BH CV ECHO MEAS - MVA P1/2T LCG: 2.1 CM^2
BH CV ECHO MEAS - MVA(P1/2T): 4.1 CM^2
BH CV ECHO MEAS - MVA(VTI): 3.3 CM^2
BH CV ECHO MEAS - RAP SYSTOLE: 8 MMHG
BH CV ECHO MEAS - RVSP: 27.5 MMHG
BH CV ECHO MEAS - SI(AO): 200.6 ML/M^2
BH CV ECHO MEAS - SI(CUBED): 37.2 ML/M^2
BH CV ECHO MEAS - SI(LVOT): 36 ML/M^2
BH CV ECHO MEAS - SI(MOD-SP2): 13.5 ML/M^2
BH CV ECHO MEAS - SI(MOD-SP4): 12.1 ML/M^2
BH CV ECHO MEAS - SI(TEICH): 32.6 ML/M^2
BH CV ECHO MEAS - SV(AO): 297.4 ML
BH CV ECHO MEAS - SV(CUBED): 55.2 ML
BH CV ECHO MEAS - SV(LVOT): 53.4 ML
BH CV ECHO MEAS - SV(MOD-SP2): 20 ML
BH CV ECHO MEAS - SV(MOD-SP4): 18 ML
BH CV ECHO MEAS - SV(TEICH): 48.3 ML
BH CV ECHO MEAS - TAPSE (>1.6): 1.8 CM
BH CV ECHO MEAS - TR MAX VEL: 221 CM/SEC
BH CV XLRA - RV BASE: 2.9 CM
BH CV XLRA - RV LENGTH: 5.2 CM
BH CV XLRA - RV MID: 2.5 CM
BUN SERPL-MCNC: 10 MG/DL (ref 8–23)
BUN/CREAT SERPL: 12.7 (ref 7–25)
CALCIUM SPEC-SCNC: 8.9 MG/DL (ref 8.6–10.5)
CHLORIDE SERPL-SCNC: 98 MMOL/L (ref 98–107)
CO2 SERPL-SCNC: 27.1 MMOL/L (ref 22–29)
CREAT SERPL-MCNC: 0.79 MG/DL (ref 0.57–1)
DEPRECATED RDW RBC AUTO: 43.6 FL (ref 37–54)
EOSINOPHIL # BLD AUTO: 0.09 10*3/MM3 (ref 0–0.4)
EOSINOPHIL NFR BLD AUTO: 1 % (ref 0.3–6.2)
ERYTHROCYTE [DISTWIDTH] IN BLOOD BY AUTOMATED COUNT: 12.7 % (ref 12.3–15.4)
GFR SERPL CREATININE-BSD FRML MDRD: 70 ML/MIN/1.73
GLUCOSE SERPL-MCNC: 98 MG/DL (ref 65–99)
HBA1C MFR BLD: 5.6 % (ref 4.8–5.6)
HCT VFR BLD AUTO: 37.5 % (ref 34–46.6)
HGB BLD-MCNC: 12.3 G/DL (ref 12–15.9)
IMM GRANULOCYTES # BLD AUTO: 0.03 10*3/MM3 (ref 0–0.05)
IMM GRANULOCYTES NFR BLD AUTO: 0.3 % (ref 0–0.5)
LEFT ATRIUM VOLUME INDEX: 26 ML/M2
LIPASE SERPL-CCNC: 43 U/L (ref 13–60)
LYMPHOCYTES # BLD AUTO: 0.71 10*3/MM3 (ref 0.7–3.1)
LYMPHOCYTES NFR BLD AUTO: 7.8 % (ref 19.6–45.3)
MAXIMAL PREDICTED HEART RATE: 138 BPM
MCH RBC QN AUTO: 30.7 PG (ref 26.6–33)
MCHC RBC AUTO-ENTMCNC: 32.8 G/DL (ref 31.5–35.7)
MCV RBC AUTO: 93.5 FL (ref 79–97)
MONOCYTES # BLD AUTO: 0.64 10*3/MM3 (ref 0.1–0.9)
MONOCYTES NFR BLD AUTO: 7 % (ref 5–12)
NEUTROPHILS NFR BLD AUTO: 7.6 10*3/MM3 (ref 1.7–7)
NEUTROPHILS NFR BLD AUTO: 83.2 % (ref 42.7–76)
NRBC BLD AUTO-RTO: 0 /100 WBC (ref 0–0.2)
NT-PROBNP SERPL-MCNC: 3314 PG/ML (ref 0–1800)
PLATELET # BLD AUTO: 224 10*3/MM3 (ref 140–450)
PMV BLD AUTO: 10.2 FL (ref 6–12)
POTASSIUM SERPL-SCNC: 4 MMOL/L (ref 3.5–5.2)
RBC # BLD AUTO: 4.01 10*6/MM3 (ref 3.77–5.28)
SARS-COV-2 ORF1AB RESP QL NAA+PROBE: NOT DETECTED
SODIUM SERPL-SCNC: 135 MMOL/L (ref 136–145)
STRESS TARGET HR: 117 BPM
TROPONIN T SERPL-MCNC: <0.01 NG/ML (ref 0–0.03)
WBC # BLD AUTO: 9.13 10*3/MM3 (ref 3.4–10.8)

## 2021-05-13 PROCEDURE — 93306 TTE W/DOPPLER COMPLETE: CPT | Performed by: INTERNAL MEDICINE

## 2021-05-13 PROCEDURE — 25010000002 ENOXAPARIN PER 10 MG: Performed by: INTERNAL MEDICINE

## 2021-05-13 PROCEDURE — 85025 COMPLETE CBC W/AUTO DIFF WBC: CPT | Performed by: INTERNAL MEDICINE

## 2021-05-13 PROCEDURE — 0 DIATRIZOATE MEGLUMINE & SODIUM PER 1 ML: Performed by: INTERNAL MEDICINE

## 2021-05-13 PROCEDURE — 83036 HEMOGLOBIN GLYCOSYLATED A1C: CPT | Performed by: INTERNAL MEDICINE

## 2021-05-13 PROCEDURE — 99222 1ST HOSP IP/OBS MODERATE 55: CPT | Performed by: INTERNAL MEDICINE

## 2021-05-13 PROCEDURE — 25010000002 DIGOXIN PER 500 MCG: Performed by: INTERNAL MEDICINE

## 2021-05-13 PROCEDURE — 83880 ASSAY OF NATRIURETIC PEPTIDE: CPT | Performed by: INTERNAL MEDICINE

## 2021-05-13 PROCEDURE — 80048 BASIC METABOLIC PNL TOTAL CA: CPT | Performed by: INTERNAL MEDICINE

## 2021-05-13 PROCEDURE — 83690 ASSAY OF LIPASE: CPT | Performed by: INTERNAL MEDICINE

## 2021-05-13 PROCEDURE — 84484 ASSAY OF TROPONIN QUANT: CPT | Performed by: INTERNAL MEDICINE

## 2021-05-13 PROCEDURE — 74177 CT ABD & PELVIS W/CONTRAST: CPT

## 2021-05-13 PROCEDURE — 93306 TTE W/DOPPLER COMPLETE: CPT

## 2021-05-13 PROCEDURE — 25010000002 IOPAMIDOL 61 % SOLUTION: Performed by: INTERNAL MEDICINE

## 2021-05-13 RX ORDER — CARVEDILOL 25 MG/1
25 TABLET ORAL 2 TIMES DAILY WITH MEALS
Status: DISCONTINUED | OUTPATIENT
Start: 2021-05-14 | End: 2021-05-14 | Stop reason: HOSPADM

## 2021-05-13 RX ORDER — DIGOXIN 0.25 MG/ML
500 INJECTION INTRAMUSCULAR; INTRAVENOUS ONCE
Status: COMPLETED | OUTPATIENT
Start: 2021-05-13 | End: 2021-05-13

## 2021-05-13 RX ADMIN — FERROUS SULFATE TAB 325 MG (65 MG ELEMENTAL FE) 325 MG: 325 (65 FE) TAB at 17:34

## 2021-05-13 RX ADMIN — PANTOPRAZOLE SODIUM 40 MG: 40 TABLET, DELAYED RELEASE ORAL at 09:13

## 2021-05-13 RX ADMIN — FLUOROMETHOLONE 1 DROP: 1 SOLUTION/ DROPS OPHTHALMIC at 09:13

## 2021-05-13 RX ADMIN — FERROUS SULFATE TAB 325 MG (65 MG ELEMENTAL FE) 325 MG: 325 (65 FE) TAB at 09:14

## 2021-05-13 RX ADMIN — SODIUM CHLORIDE, PRESERVATIVE FREE 3 ML: 5 INJECTION INTRAVENOUS at 21:49

## 2021-05-13 RX ADMIN — FUROSEMIDE 20 MG: 20 TABLET ORAL at 09:14

## 2021-05-13 RX ADMIN — DIGOXIN 500 MCG: 0.25 INJECTION INTRAMUSCULAR; INTRAVENOUS at 12:54

## 2021-05-13 RX ADMIN — LOSARTAN POTASSIUM 50 MG: 50 TABLET, FILM COATED ORAL at 09:13

## 2021-05-13 RX ADMIN — FLUOROMETHOLONE 1 DROP: 1 SOLUTION/ DROPS OPHTHALMIC at 21:11

## 2021-05-13 RX ADMIN — ENOXAPARIN SODIUM 40 MG: 40 INJECTION SUBCUTANEOUS at 09:13

## 2021-05-13 RX ADMIN — DOCUSATE SODIUM 50MG AND SENNOSIDES 8.6MG 2 TABLET: 8.6; 5 TABLET, FILM COATED ORAL at 21:11

## 2021-05-13 RX ADMIN — PANCRELIPASE 24000 UNITS OF LIPASE: 60000; 12000; 38000 CAPSULE, DELAYED RELEASE PELLETS ORAL at 11:42

## 2021-05-13 RX ADMIN — PANCRELIPASE 24000 UNITS OF LIPASE: 60000; 12000; 38000 CAPSULE, DELAYED RELEASE PELLETS ORAL at 17:34

## 2021-05-13 RX ADMIN — IOPAMIDOL 85 ML: 612 INJECTION, SOLUTION INTRAVENOUS at 14:53

## 2021-05-13 RX ADMIN — SODIUM CHLORIDE, PRESERVATIVE FREE 3 ML: 5 INJECTION INTRAVENOUS at 09:14

## 2021-05-13 RX ADMIN — ACETAMINOPHEN 650 MG: 325 TABLET, FILM COATED ORAL at 00:02

## 2021-05-13 RX ADMIN — POTASSIUM CHLORIDE 20 MEQ: 750 TABLET, EXTENDED RELEASE ORAL at 09:13

## 2021-05-13 RX ADMIN — APIXABAN 2.5 MG: 2.5 TABLET, FILM COATED ORAL at 21:11

## 2021-05-13 RX ADMIN — DOCUSATE SODIUM 50MG AND SENNOSIDES 8.6MG 2 TABLET: 8.6; 5 TABLET, FILM COATED ORAL at 09:13

## 2021-05-13 RX ADMIN — PANCRELIPASE 24000 UNITS OF LIPASE: 60000; 12000; 38000 CAPSULE, DELAYED RELEASE PELLETS ORAL at 09:14

## 2021-05-13 RX ADMIN — OFLOXACIN 1 DROP: 3 SOLUTION/ DROPS OPHTHALMIC at 21:11

## 2021-05-13 RX ADMIN — OFLOXACIN 1 DROP: 3 SOLUTION/ DROPS OPHTHALMIC at 16:46

## 2021-05-13 RX ADMIN — CARVEDILOL 12.5 MG: 12.5 TABLET, FILM COATED ORAL at 09:14

## 2021-05-13 RX ADMIN — DIATRIZOATE MEGLUMINE AND DIATRIZOATE SODIUM 30 ML: 600; 100 SOLUTION ORAL; RECTAL at 12:54

## 2021-05-13 RX ADMIN — OFLOXACIN 1 DROP: 3 SOLUTION/ DROPS OPHTHALMIC at 09:13

## 2021-05-14 VITALS
HEART RATE: 109 BPM | HEIGHT: 63 IN | BODY MASS INDEX: 18.61 KG/M2 | WEIGHT: 105 LBS | OXYGEN SATURATION: 94 % | SYSTOLIC BLOOD PRESSURE: 118 MMHG | TEMPERATURE: 98 F | RESPIRATION RATE: 20 BRPM | DIASTOLIC BLOOD PRESSURE: 77 MMHG

## 2021-05-14 PROBLEM — K56.7 ILEUS, UNSPECIFIED (HCC): Status: ACTIVE | Noted: 2021-05-14

## 2021-05-14 LAB
ANION GAP SERPL CALCULATED.3IONS-SCNC: 6.3 MMOL/L (ref 5–15)
BASOPHILS # BLD AUTO: 0.09 10*3/MM3 (ref 0–0.2)
BASOPHILS NFR BLD AUTO: 1.4 % (ref 0–1.5)
BUN SERPL-MCNC: 10 MG/DL (ref 8–23)
BUN/CREAT SERPL: 14.3 (ref 7–25)
CALCIUM SPEC-SCNC: 8.6 MG/DL (ref 8.6–10.5)
CHLORIDE SERPL-SCNC: 103 MMOL/L (ref 98–107)
CO2 SERPL-SCNC: 26.7 MMOL/L (ref 22–29)
CREAT SERPL-MCNC: 0.7 MG/DL (ref 0.57–1)
DEPRECATED RDW RBC AUTO: 44 FL (ref 37–54)
EOSINOPHIL # BLD AUTO: 0.43 10*3/MM3 (ref 0–0.4)
EOSINOPHIL NFR BLD AUTO: 6.8 % (ref 0.3–6.2)
ERYTHROCYTE [DISTWIDTH] IN BLOOD BY AUTOMATED COUNT: 12.7 % (ref 12.3–15.4)
GFR SERPL CREATININE-BSD FRML MDRD: 80 ML/MIN/1.73
GLUCOSE SERPL-MCNC: 90 MG/DL (ref 65–99)
HCT VFR BLD AUTO: 37.7 % (ref 34–46.6)
HGB BLD-MCNC: 12.6 G/DL (ref 12–15.9)
IMM GRANULOCYTES # BLD AUTO: 0.02 10*3/MM3 (ref 0–0.05)
IMM GRANULOCYTES NFR BLD AUTO: 0.3 % (ref 0–0.5)
LYMPHOCYTES # BLD AUTO: 1.37 10*3/MM3 (ref 0.7–3.1)
LYMPHOCYTES NFR BLD AUTO: 21.6 % (ref 19.6–45.3)
MCH RBC QN AUTO: 31.8 PG (ref 26.6–33)
MCHC RBC AUTO-ENTMCNC: 33.4 G/DL (ref 31.5–35.7)
MCV RBC AUTO: 95.2 FL (ref 79–97)
MONOCYTES # BLD AUTO: 0.81 10*3/MM3 (ref 0.1–0.9)
MONOCYTES NFR BLD AUTO: 12.8 % (ref 5–12)
NEUTROPHILS NFR BLD AUTO: 3.63 10*3/MM3 (ref 1.7–7)
NEUTROPHILS NFR BLD AUTO: 57.1 % (ref 42.7–76)
NRBC BLD AUTO-RTO: 0 /100 WBC (ref 0–0.2)
PLATELET # BLD AUTO: 198 10*3/MM3 (ref 140–450)
PMV BLD AUTO: 10.1 FL (ref 6–12)
POTASSIUM SERPL-SCNC: 3.9 MMOL/L (ref 3.5–5.2)
QT INTERVAL: 333 MS
RBC # BLD AUTO: 3.96 10*6/MM3 (ref 3.77–5.28)
SODIUM SERPL-SCNC: 136 MMOL/L (ref 136–145)
WBC # BLD AUTO: 6.35 10*3/MM3 (ref 3.4–10.8)

## 2021-05-14 PROCEDURE — 80048 BASIC METABOLIC PNL TOTAL CA: CPT | Performed by: INTERNAL MEDICINE

## 2021-05-14 PROCEDURE — 85025 COMPLETE CBC W/AUTO DIFF WBC: CPT | Performed by: INTERNAL MEDICINE

## 2021-05-14 PROCEDURE — 93010 ELECTROCARDIOGRAM REPORT: CPT | Performed by: INTERNAL MEDICINE

## 2021-05-14 PROCEDURE — 99232 SBSQ HOSP IP/OBS MODERATE 35: CPT | Performed by: NURSE PRACTITIONER

## 2021-05-14 PROCEDURE — 93005 ELECTROCARDIOGRAM TRACING: CPT | Performed by: INTERNAL MEDICINE

## 2021-05-14 PROCEDURE — 99233 SBSQ HOSP IP/OBS HIGH 50: CPT | Performed by: INTERNAL MEDICINE

## 2021-05-14 RX ORDER — ACETAMINOPHEN 325 MG/1
650 TABLET ORAL EVERY 4 HOURS PRN
Start: 2021-05-14

## 2021-05-14 RX ORDER — DIGOXIN 125 MCG
125 TABLET ORAL
Status: DISCONTINUED | OUTPATIENT
Start: 2021-05-14 | End: 2021-05-14 | Stop reason: HOSPADM

## 2021-05-14 RX ORDER — DIGOXIN 125 MCG
125 TABLET ORAL
Qty: 30 TABLET | Refills: 0 | Status: SHIPPED | OUTPATIENT
Start: 2021-05-15 | End: 2021-05-19 | Stop reason: SDUPTHER

## 2021-05-14 RX ORDER — CARVEDILOL 25 MG/1
25 TABLET ORAL 2 TIMES DAILY WITH MEALS
Qty: 60 TABLET | Refills: 2 | Status: SHIPPED | OUTPATIENT
Start: 2021-05-14

## 2021-05-14 RX ORDER — FUROSEMIDE 20 MG/1
20 TABLET ORAL DAILY
Start: 2021-05-15

## 2021-05-14 RX ADMIN — FERROUS SULFATE TAB 325 MG (65 MG ELEMENTAL FE) 325 MG: 325 (65 FE) TAB at 08:26

## 2021-05-14 RX ADMIN — FLUOROMETHOLONE 1 DROP: 1 SOLUTION/ DROPS OPHTHALMIC at 08:26

## 2021-05-14 RX ADMIN — CARVEDILOL 25 MG: 25 TABLET, FILM COATED ORAL at 08:26

## 2021-05-14 RX ADMIN — FERROUS SULFATE TAB 325 MG (65 MG ELEMENTAL FE) 325 MG: 325 (65 FE) TAB at 16:54

## 2021-05-14 RX ADMIN — PANCRELIPASE 24000 UNITS OF LIPASE: 60000; 12000; 38000 CAPSULE, DELAYED RELEASE PELLETS ORAL at 08:26

## 2021-05-14 RX ADMIN — SODIUM CHLORIDE, PRESERVATIVE FREE 3 ML: 5 INJECTION INTRAVENOUS at 08:26

## 2021-05-14 RX ADMIN — APIXABAN 2.5 MG: 2.5 TABLET, FILM COATED ORAL at 08:26

## 2021-05-14 RX ADMIN — DIGOXIN 125 MCG: 125 TABLET ORAL at 16:54

## 2021-05-14 RX ADMIN — FUROSEMIDE 20 MG: 20 TABLET ORAL at 08:26

## 2021-05-14 RX ADMIN — POTASSIUM CHLORIDE 20 MEQ: 750 TABLET, EXTENDED RELEASE ORAL at 08:25

## 2021-05-14 RX ADMIN — PANTOPRAZOLE SODIUM 40 MG: 40 TABLET, DELAYED RELEASE ORAL at 08:26

## 2021-05-14 RX ADMIN — CARVEDILOL 25 MG: 25 TABLET, FILM COATED ORAL at 16:54

## 2021-05-14 RX ADMIN — DOCUSATE SODIUM 50MG AND SENNOSIDES 8.6MG 2 TABLET: 8.6; 5 TABLET, FILM COATED ORAL at 08:26

## 2021-05-14 RX ADMIN — OFLOXACIN 1 DROP: 3 SOLUTION/ DROPS OPHTHALMIC at 08:26

## 2021-05-15 ENCOUNTER — READMISSION MANAGEMENT (OUTPATIENT)
Dept: CALL CENTER | Facility: HOSPITAL | Age: 81
End: 2021-05-15

## 2021-05-15 NOTE — OUTREACH NOTE
Prep Survey      Responses   [facility] facility patient discharged from?  [place]   Is LACE score < 7 ?  No   Emergency Room discharge w/ pulse ox?  No   Eligibility  Readm Mgmt   Discharge diagnosis  PAF and Takotsubo CMP   Does the patient have one of the following disease processes/diagnoses(primary or secondary)?  Other   Does the patient have Home health ordered?  No   Is there a DME ordered?  No   Prep survey completed?  Yes          Jasmina Erazo RN

## 2021-05-17 NOTE — PROGRESS NOTES
Discharge Planning Assessment  [facility]     Patient Name: Bhumi Gimenez  MRN: [id number]  Today's Date: 5/17/2021    Admit Date: 5/12/2021    Discharge Needs Assessment    No documentation.       Discharge Plan     Row Name 05/17/21 1321       Plan    Plan  Home    Final Discharge Disposition Code  01 - home or self-care    Final Note  Home        Continued Care and Services - Discharged on 5/14/2021 Admission date: 5/12/2021 - Discharge disposition: Home or Self Care   Coordination has not been started for this encounter.       Expected Discharge Date and Time     Expected Discharge Date Expected Discharge Time    May 14, 2021         Demographic Summary    No documentation.       Functional Status    No documentation.       Psychosocial    No documentation.       Abuse/Neglect    No documentation.       Legal    No documentation.       Substance Abuse    No documentation.       Patient Forms    No documentation.           Meagan Monterroso RN

## 2021-05-18 ENCOUNTER — READMISSION MANAGEMENT (OUTPATIENT)
Dept: CALL CENTER | Facility: HOSPITAL | Age: 81
End: 2021-05-18

## 2021-05-18 NOTE — OUTREACH NOTE
Medical Week 1 Survey      Responses   [facility] patient discharged from?  [place]   Does the patient have one of the following disease processes/diagnoses(primary or secondary)?  Other   Week 1 attempt successful?  No   Unsuccessful attempts  Attempt 1          Laverne Pacheco RN

## 2021-05-19 ENCOUNTER — READMISSION MANAGEMENT (OUTPATIENT)
Dept: CALL CENTER | Facility: HOSPITAL | Age: 81
End: 2021-05-19

## 2021-05-19 ENCOUNTER — OFFICE VISIT (OUTPATIENT)
Dept: CARDIOLOGY | Facility: CLINIC | Age: 81
End: 2021-05-19

## 2021-05-19 VITALS
HEIGHT: 64 IN | HEART RATE: 68 BPM | SYSTOLIC BLOOD PRESSURE: 116 MMHG | DIASTOLIC BLOOD PRESSURE: 74 MMHG | WEIGHT: 105 LBS | OXYGEN SATURATION: 95 % | BODY MASS INDEX: 17.93 KG/M2

## 2021-05-19 DIAGNOSIS — Z79.899 HIGH RISK MEDICATION USE: ICD-10-CM

## 2021-05-19 DIAGNOSIS — I51.81 TAKOTSUBO CARDIOMYOPATHY: ICD-10-CM

## 2021-05-19 DIAGNOSIS — I48.0 PAROXYSMAL ATRIAL FIBRILLATION (HCC): Primary | ICD-10-CM

## 2021-05-19 PROCEDURE — 93000 ELECTROCARDIOGRAM COMPLETE: CPT | Performed by: NURSE PRACTITIONER

## 2021-05-19 PROCEDURE — 99214 OFFICE O/P EST MOD 30 MIN: CPT | Performed by: NURSE PRACTITIONER

## 2021-05-19 RX ORDER — DIGOXIN 250 MCG
250 TABLET ORAL
Qty: 30 TABLET | Refills: 3 | Status: SHIPPED | OUTPATIENT
Start: 2021-05-19 | End: 2021-05-19 | Stop reason: SDUPTHER

## 2021-05-19 RX ORDER — DIGOXIN 250 MCG
250 TABLET ORAL
Qty: 30 TABLET | Refills: 3 | Status: SHIPPED | OUTPATIENT
Start: 2021-05-19 | End: 2021-10-25

## 2021-05-19 NOTE — OUTREACH NOTE
Medical Week 1 Survey      Responses   [facility] patient discharged from?  [place]   Does the patient have one of the following disease processes/diagnoses(primary or secondary)?  Other   Week 1 attempt successful?  Yes   Call start time  1629   Call end time  1631   Discharge diagnosis  PAF and Takotsubo CMP   Is patient permission given to speak with other caregiver?  No   Meds reviewed with patient/caregiver?  Yes   Is the patient having any side effects they believe may be caused by any medication additions or changes?  No   Does the patient have all medications ordered at discharge?  Yes   Is the patient taking all medications as directed (includes completed medication regime)?  Yes   Does the patient have a primary care provider?   Yes   Does the patient have an appointment with their PCP within 7 days of discharge?  Yes   Has the patient kept scheduled appointments due by today?  Yes   Has home health visited the patient within 72 hours of discharge?  N/A   Psychosocial issues?  No   Did the patient receive a copy of their discharge instructions?  Yes   Nursing interventions  Reviewed instructions with patient   What is the patient's perception of their health status since discharge?  Improving   Is the patient/caregiver able to teach back signs and symptoms related to disease process for when to call PCP?  Yes   Is the patient/caregiver able to teach back signs and symptoms related to disease process for when to call 911?  Yes   Is the patient/caregiver able to teach back the hierarchy of who to call/visit for symptoms/problems? PCP, Specialist, Home health nurse, Urgent Care, ED, 911  Yes   If the patient is a current smoker, are they able to teach back resources for cessation?  Not a smoker   Week 1 call completed?  Yes   Wrap up additional comments  No questions. She saw cardiology today.  Meds changed.  She understands how to take everything.           Loyda Dotson RN

## 2021-05-19 NOTE — PROGRESS NOTES
"    CARDIOLOGY        Patient Name: Bhumi Gimenez  :1940  Age: 81 y.o.  Primary Cardiologist: Michael Subramanian MD  Encounter Provider:  ANTONY Queen    Date of Service: 21          CHIEF COMPLAINT / REASON FOR OFFICE VISIT     Atrial Fibrillation (f/u for SOA) and Shortness of Breath      HISTORY OF PRESENT ILLNESS       HPI  Bhumi Gimenez is a 81 y.o. female who presents today for hospital follow-up.     Pt has a  history significant for PAF, rheumatoid arthritis, hypertension, pancreatitis, irritable bowel syndrome.    Review of medical records reveals patient was hospitalized with discharge date 2021.  Patient was seen by her PCP where she was being evaluated for shortness of breath.  ECG done in the office revealed atrial fibrillation with rate of 116.  Patient was hospitalized for further evaluation.  Beta-blocker dosing was adjusted.  Patient was started on low-dose apixaban.    Patient reports that since discharge she has done well and not had any symptoms.  She states that she has not had any episodes of shortness of breath, chest pain, heart palpitations or tachycardias, lightheadedness, lower extremity edema, fatigue.  She is tolerating apixaban without any adverse bleeding complications.    The following portions of the patient's history were reviewed and updated as appropriate: allergies, current medications, past family history, past medical history, past social history, past surgical history and problem list.      VITAL SIGNS     Visit Vitals  /74 (BP Location: Left arm, Patient Position: Sitting, Cuff Size: Adult)   Pulse 68   Ht 161.3 cm (63.5\")   Wt 47.6 kg (105 lb)   SpO2 95%   BMI 18.31 kg/m²         Wt Readings from Last 3 Encounters:   21 47.6 kg (105 lb)   21 47.6 kg (105 lb)   20 50 kg (110 lb 3.2 oz)     Body mass index is 18.31 kg/m².      REVIEW OF SYSTEMS   Review of Systems   Constitutional: Negative for chills, fever, weight " gain and weight loss.   Cardiovascular: Negative for leg swelling.   Respiratory: Negative for cough, snoring and wheezing.    Hematologic/Lymphatic: Negative for bleeding problem. Does not bruise/bleed easily.   Skin: Negative for color change.   Musculoskeletal: Negative for falls, joint pain and myalgias.   Gastrointestinal: Negative for melena.   Genitourinary: Negative for hematuria.   Neurological: Negative for excessive daytime sleepiness.   Psychiatric/Behavioral: Negative for depression. The patient is not nervous/anxious.            PHYSICAL EXAMINATION     Vitals and nursing note reviewed.   Constitutional:       Appearance: Normal appearance. Well-developed.   Eyes:      Conjunctiva/sclera: Conjunctivae normal.   Neck:      Vascular: No carotid bruit.   Pulmonary:      Breath sounds: Normal breath sounds.   Cardiovascular:      Tachycardia present. Irregularly irregular rhythm. Normal S1 with normal intensity. Normal S2 with normal intensity.      Murmurs: There is no murmur.      No gallop. No click. No rub.   Edema:     Peripheral edema absent.   Musculoskeletal: Normal range of motion. Skin:     General: Skin is warm and dry.   Neurological:      Mental Status: Alert and oriented to person, place, and time.      GCS: GCS eye subscore is 4. GCS verbal subscore is 5. GCS motor subscore is 6.   Psychiatric:         Speech: Speech normal.         Behavior: Behavior normal.         Thought Content: Thought content normal.         Judgment: Judgment normal.           REVIEWED DATA       ECG 12 Lead    Date/Time: 5/19/2021 8:31 AM  Performed by: Ewa Timmons APRN  Authorized by: Ewa Timmons APRN   Comparison: compared with previous ECG from 5/12/2021  Rhythm: atrial fibrillation  Rate: tachycardic  BPM: 103  Conduction: conduction normal  ST Segments: ST segments normal  T Waves: T waves normal  Other: no other findings    Clinical impression: abnormal EKG            Cardiac  Procedures:  1. Echocardiogram 5/13/2021.  LVEF 56-60%.  All LV wall segments contract normally.  LAE.  Mild to moderate mitral valve regurgitation.  Moderate aortic valve stenosis.  Mild to moderate tricuspid valve regurgitation with estimated RVSP less than 35 mmHg.    Lipid Panel    Lipid Panel 5/12/21   Total Cholesterol 157   Triglycerides 56   HDL Cholesterol 75 (A)   VLDL Cholesterol 12   LDL Cholesterol  70   LDL/HDL Ratio 0.94   (A) Abnormal value                ASSESSMENT & PLAN      Diagnosis Plan   1. Paroxysmal atrial fibrillation (CMS/HCC)  Basic Metabolic Panel    Digoxin Level   2. High risk medication use  Digoxin Level   3. Takotsubo cardiomyopathy           SUMMARY/DISCUSSION  1. PAF.  Patient with recent hospitalization for atrial fibrillation with RVR where she was placed on digoxin 125 mcg/day and carvedilol 25 mg twice per day.  She presents today stating she has not had any symptoms since discharge such as shortness of breath, tachycardia, palpitations.  ECG in office today reveals atrial fibrillation with rate of 103, however when patient was hooked to the ECG machine her heart rate elevated to the mid 120s.  Patient does need better rate control.  She is currently on max dose of beta-blocker.  I recommended that we increase digoxin to 250 mcg/day.  Patient does have history of renal disease with most recent creatinine during hospitalization 0.70.  I have ordered a digoxin level and BMP to be drawn at her PCP appointment on 5/24.  She is anticoagulated with apixaban 2.5 mg twice per day.  She is very active and exercising routinely.  2. High risk medication use.  Digoxin level to be drawn 5/24  3. Takotsubo cardiomyopathy.  Denies shortness of breath.  Patient euvolemic on exam today.  Continue carvedilol 25 mg twice per day and furosemide 20 mg/day.  4. Follow-up with me in 1 month and Dr. Subramanian in 4 months for reevaluation.        MEDICATIONS         Discharge Medications           Accurate as of May 19, 2021  9:46 AM. If you have any questions, ask your nurse or doctor.            Changes to Medications      Instructions Start Date   digoxin 250 MCG tablet  Commonly known as: LANOXIN  What changed:   · medication strength  · how much to take  Changed by: ANTONY Queen   250 mcg, Oral, Daily Digoxin         Continue These Medications      Instructions Start Date   acetaminophen 325 MG tablet  Commonly known as: TYLENOL   650 mg, Oral, Every 4 Hours PRN      apixaban 2.5 MG tablet tablet  Commonly known as: ELIQUIS   2.5 mg, Oral, Every 12 Hours Scheduled      carvedilol 25 MG tablet  Commonly known as: COREG   25 mg, Oral, 2 Times Daily With Meals      ferrous sulfate 325 (65 FE) MG EC tablet   325 mg, Oral, Daily With Breakfast      fluorometholone 0.1 % ophthalmic suspension  Commonly known as: FML   SHAKE GENTLY AND INSTILL ONE DROP IN RIGHT EYE BID.      furosemide 20 MG tablet  Commonly known as: Lasix   20 mg, Oral, Daily      ofloxacin 0.3 % ophthalmic solution  Commonly known as: OCUFLOX   INSTILL 1 DROP INTO RIGHT EYE 3 TIMES A DAY FOR 1 WEEK      pantoprazole 40 MG EC tablet  Commonly known as: PROTONIX   40 mg, Oral, Daily      potassium chloride ER 20 MEQ tablet controlled-release ER tablet  Commonly known as: K-TAB   20 mEq, Oral, Daily                 **Dragon Disclaimer:   Much of this encounter note is an electronic transcription/translation of spoken language to printed text. The electronic translation of spoken language may permit erroneous, or at times, nonsensical words or phrases to be inadvertently transcribed. Although I have reviewed the note for such errors, some may still exist.

## 2021-05-28 ENCOUNTER — READMISSION MANAGEMENT (OUTPATIENT)
Dept: CALL CENTER | Facility: HOSPITAL | Age: 81
End: 2021-05-28

## 2021-05-28 NOTE — OUTREACH NOTE
Medical Week 2 Survey      Responses   [facility] patient discharged from?  [place]   Does the patient have one of the following disease processes/diagnoses(primary or secondary)?  Other   Week 2 attempt successful?  Yes   Call start time  1248   Discharge diagnosis  PAF and Takotsubo CMP   Call end time  1250   Meds reviewed with patient/caregiver?  Yes   Is the patient having any side effects they believe may be caused by any medication additions or changes?  No   Does the patient have all medications ordered at discharge?  Yes   Is the patient taking all medications as directed (includes completed medication regime)?  Yes   Does the patient have a primary care provider?   Yes   Does the patient have an appointment with their PCP within 7 days of discharge?  Yes   Has the patient kept scheduled appointments due by today?  Yes   Comments  Will call PCP to set up appointment with PCP next week.    Has home health visited the patient within 72 hours of discharge?  N/A   Psychosocial issues?  No   Did the patient receive a copy of their discharge instructions?  Yes   What is the patient's perception of their health status since discharge?  Improving   Is the patient/caregiver able to teach back signs and symptoms related to disease process for when to call PCP?  Yes   Is the patient/caregiver able to teach back signs and symptoms related to disease process for when to call 911?  Yes   If the patient is a current smoker, are they able to teach back resources for cessation?  Not a smoker   Week 2 Call Completed?  Yes   Wrap up additional comments  No questions or needs identified.           Vivi Salazar RN

## 2021-06-23 ENCOUNTER — OFFICE VISIT (OUTPATIENT)
Dept: CARDIOLOGY | Facility: CLINIC | Age: 81
End: 2021-06-23

## 2021-06-23 VITALS
HEART RATE: 68 BPM | OXYGEN SATURATION: 98 % | SYSTOLIC BLOOD PRESSURE: 118 MMHG | BODY MASS INDEX: 17.93 KG/M2 | WEIGHT: 105 LBS | HEIGHT: 64 IN | DIASTOLIC BLOOD PRESSURE: 78 MMHG

## 2021-06-23 DIAGNOSIS — I48.0 PAROXYSMAL ATRIAL FIBRILLATION (HCC): Primary | ICD-10-CM

## 2021-06-23 DIAGNOSIS — Z79.899 HIGH RISK MEDICATION USE: ICD-10-CM

## 2021-06-23 DIAGNOSIS — I51.81 TAKOTSUBO CARDIOMYOPATHY: ICD-10-CM

## 2021-06-23 PROCEDURE — 93000 ELECTROCARDIOGRAM COMPLETE: CPT | Performed by: NURSE PRACTITIONER

## 2021-06-23 PROCEDURE — 99214 OFFICE O/P EST MOD 30 MIN: CPT | Performed by: NURSE PRACTITIONER

## 2021-06-23 NOTE — PROGRESS NOTES
"    CARDIOLOGY        Patient Name: Bhumi Gimenez  :1940  Age: 81 y.o.  Primary Cardiologist: Michael Subramanian MD  Encounter Provider:  ANTONY Queen    Date of Service: 21          CHIEF COMPLAINT / REASON FOR OFFICE VISIT     Atrial Fibrillation (1 month f/u )      HISTORY OF PRESENT ILLNESS       HPI    Bhumi Gimenez is a 81 y.o. female who presents today for reevaluation of atrial fibrillation..     Pt has a  history significant for PAF, rheumatoid arthritis, hypertension, pancreatitis, irritable bowel syndrome.    Review of medical records reveals patient was hospitalized with discharge date 2021.  Patient was seen by her PCP where she was being evaluated for shortness of breath.  ECG done in the office revealed atrial fibrillation with rate of 116.  Patient was hospitalized for further evaluation.  Beta-blocker dosing was adjusted.  Patient was started on low-dose apixaban.    At appointment 1 month ago patient was still experiencing intermittent episodes of tachycardia, at that time digoxin was increased.  Patient reports that she did have laboratory studies approximately 1 week ago at her PCP office.  She states that they were reviewed with her and she was told that they were \"normal\".  She states she did get a digoxin level.  Patient reports that she is tolerating her carvedilol and apixaban without any adverse effects.  She states that she has had 2 episodes where she feels short of breath and a little bit fatigued but states that they were very brief episodes and have resolved.  She is walking 3 to 4 miles per day without any limiting symptoms.  She is otherwise asymptomatic.    The following portions of the patient's history were reviewed and updated as appropriate: allergies, current medications, past family history, past medical history, past social history, past surgical history and problem list.      VITAL SIGNS     Visit Vitals  /78 (BP Location: Left arm, " "Patient Position: Sitting, Cuff Size: Adult)   Pulse 68   Ht 161.3 cm (63.5\")   Wt 47.6 kg (105 lb)   SpO2 98%   BMI 18.31 kg/m²         Wt Readings from Last 3 Encounters:   06/23/21 47.6 kg (105 lb)   05/19/21 47.6 kg (105 lb)   05/14/21 47.6 kg (105 lb)     Body mass index is 18.31 kg/m².      REVIEW OF SYSTEMS   Review of Systems   Constitutional: Negative for chills, fever, weight gain and weight loss.   Cardiovascular: Negative for leg swelling.   Respiratory: Negative for cough, snoring and wheezing.    Hematologic/Lymphatic: Negative for bleeding problem. Does not bruise/bleed easily.   Skin: Negative for color change.   Musculoskeletal: Negative for falls, joint pain and myalgias.   Gastrointestinal: Negative for melena.   Genitourinary: Negative for hematuria.   Neurological: Negative for excessive daytime sleepiness.   Psychiatric/Behavioral: Negative for depression. The patient is not nervous/anxious.            PHYSICAL EXAMINATION     Vitals and nursing note reviewed.   Constitutional:       Appearance: Normal appearance. Well-developed.   Eyes:      Conjunctiva/sclera: Conjunctivae normal.   Neck:      Vascular: No carotid bruit.   Pulmonary:      Breath sounds: Normal breath sounds.   Cardiovascular:      Irregularly irregular rhythm. Normal S1 with normal intensity. Normal S2 with normal intensity.      Murmurs: There is no murmur.      No gallop. No click. No rub.   Edema:     Peripheral edema absent.   Musculoskeletal: Normal range of motion. Skin:     General: Skin is warm and dry.   Neurological:      Mental Status: Alert and oriented to person, place, and time.      GCS: GCS eye subscore is 4. GCS verbal subscore is 5. GCS motor subscore is 6.   Psychiatric:         Speech: Speech normal.         Behavior: Behavior normal.         Thought Content: Thought content normal.         Judgment: Judgment normal.           REVIEWED DATA       ECG 12 Lead    Date/Time: 6/23/2021 9:39 AM  Performed by: " Ewa Timmons APRN  Authorized by: Ewa Timmons APRN   Comparison: compared with previous ECG from 5/19/2021  Rhythm: atrial fibrillation  Rate: normal  BPM: 75  ST Segments: ST segments normal  T Waves: T waves normal  QRS axis: normal    Clinical impression: abnormal EKG            Cardiac Procedures:  1. Echocardiogram 5/13/2021.  LVEF 56-60%.  All LV wall segments contract normally.  LAE.  Mild to moderate mitral valve regurgitation.  Moderate aortic valve stenosis.  Mild to moderate tricuspid valve regurgitation with estimated RVSP less than 35 mmHg.    Lipid Panel    Lipid Panel 5/12/21   Total Cholesterol 157   Triglycerides 56   HDL Cholesterol 75 (A)   VLDL Cholesterol 12   LDL Cholesterol  70   LDL/HDL Ratio 0.94   (A) Abnormal value                ASSESSMENT & PLAN      Diagnosis Plan   1. Paroxysmal atrial fibrillation (CMS/HCC)     2. High risk medication use     3. Takotsubo cardiomyopathy           SUMMARY/DISCUSSION  1. PAF.  Patient's heart rate is much more controlled in office today.  Heart rate in the upper 60s-70s.  Patient will continue carvedilol 25 mg twice per day, digoxin 250 mcg/day.  We will request laboratory studies from PCP office.  Patient is anticoagulated with apixaban per renal dosing and not having any bleeding complications.  2. High risk medication use.  Patient is on digoxin as well as apixaban.  3. Takotsubo cardiomyopathy.  Currently denies any shortness of breath, dyspnea with exertion, signs of volume overload.  Patient walks 3 to 4 miles per day without any limiting symptoms.  Continue carvedilol 25 mg twice per day and furosemide 20 mg/day.  4. Follow-up with Dr. Subramanian in 4 months for reevaluation.        MEDICATIONS         Discharge Medications          Accurate as of June 23, 2021 10:18 AM. If you have any questions, ask your nurse or doctor.            Continue These Medications      Instructions Start Date   acetaminophen 325 MG tablet  Commonly known  as: TYLENOL   650 mg, Oral, Every 4 Hours PRN      apixaban 2.5 MG tablet tablet  Commonly known as: ELIQUIS   2.5 mg, Oral, Every 12 Hours Scheduled      carvedilol 25 MG tablet  Commonly known as: COREG   25 mg, Oral, 2 Times Daily With Meals      digoxin 250 MCG tablet  Commonly known as: LANOXIN   250 mcg, Oral, Daily Digoxin      ferrous sulfate 325 (65 FE) MG EC tablet   325 mg, Oral, Daily With Breakfast      fluorometholone 0.1 % ophthalmic suspension  Commonly known as: FML   SHAKE GENTLY AND INSTILL ONE DROP IN RIGHT EYE BID.      furosemide 20 MG tablet  Commonly known as: Lasix   20 mg, Oral, Daily      methotrexate 2.5 MG tablet   TAKE 5 TABLETS BY MOUTH EVERY WEEK      ofloxacin 0.3 % ophthalmic solution  Commonly known as: OCUFLOX   INSTILL 1 DROP INTO RIGHT EYE 3 TIMES A DAY FOR 1 WEEK      pantoprazole 40 MG EC tablet  Commonly known as: PROTONIX   40 mg, Oral, Daily      potassium chloride ER 20 MEQ tablet controlled-release ER tablet  Commonly known as: K-TAB   20 mEq, Oral, Daily                 **Dragon Disclaimer:   Much of this encounter note is an electronic transcription/translation of spoken language to printed text. The electronic translation of spoken language may permit erroneous, or at times, nonsensical words or phrases to be inadvertently transcribed. Although I have reviewed the note for such errors, some may still exist.

## 2021-10-18 ENCOUNTER — OFFICE VISIT (OUTPATIENT)
Dept: CARDIOLOGY | Facility: CLINIC | Age: 81
End: 2021-10-18

## 2021-10-18 VITALS
DIASTOLIC BLOOD PRESSURE: 78 MMHG | HEART RATE: 95 BPM | WEIGHT: 110 LBS | OXYGEN SATURATION: 98 % | RESPIRATION RATE: 18 BRPM | BODY MASS INDEX: 25.46 KG/M2 | HEIGHT: 55 IN | SYSTOLIC BLOOD PRESSURE: 110 MMHG

## 2021-10-18 DIAGNOSIS — I48.0 PAF (PAROXYSMAL ATRIAL FIBRILLATION) (HCC): ICD-10-CM

## 2021-10-18 DIAGNOSIS — R06.02 SHORTNESS OF BREATH: ICD-10-CM

## 2021-10-18 DIAGNOSIS — I35.0 NONRHEUMATIC AORTIC VALVE STENOSIS: ICD-10-CM

## 2021-10-18 DIAGNOSIS — I51.81 TAKOTSUBO CARDIOMYOPATHY: ICD-10-CM

## 2021-10-18 DIAGNOSIS — M54.6 MIDLINE THORACIC BACK PAIN, UNSPECIFIED CHRONICITY: Primary | ICD-10-CM

## 2021-10-18 PROCEDURE — 99214 OFFICE O/P EST MOD 30 MIN: CPT | Performed by: INTERNAL MEDICINE

## 2021-10-19 NOTE — PROGRESS NOTES
Date of Office Visit: 10/18/2021  Encounter Provider: Praish Subramanian MD  Place of Service: [facility] CARDIOLOGY  Patient Name: Bhumi Gimenez  :1940    Chief complaint: Follow-up for paroxysmal atrial fibrillation, aortic stenosis, history of   stress-induced cardiomyopathy.    History of Present Illness:    Dear Dr. Cheatham,     I again had the pleasure of seeing your patient in cardiology office on 10/18/2021.  As  you well know, she is a very pleasant 79 year-old white female with a past medical history  significant for multiple medical problems who presents for follow-up regarding  stress-induced cardiomyopathy and paroxysmal atrial fibrillation.  The patient has a very  complicated past medical history.  She was admitted on 2013 with acute on chronic  pancreatitis.  She does have a history of drinking 3-4 alcoholic beverages per day.  She  subsequently developed multiple medical issues while hospitalized, including acute on  chronic kidney disease, respiratory failure, and atrial fibrillation with rapid  ventricular response.  During her evaluation for the paroxysmal atrial fibrillation, she  had an echocardiogram performed on 2013 which showed an ejection fraction of  60%-65%.  However, on 2013, the patient developed severe EKG changes which   were concerning for a possible anterior myocardial infarction.  She also was noted to   have an elevated troponin at that time.  A follow up echocardiogram on 2013   showed an ejection fraction of 20%-25%.  The decision was made not to proceed with   cardiac cath secondary to significant thrombocytopenia and kidney injury.  However, the  patient's ejection fraction improved on 12/3/2013 and was noted to be 50%-55%.   Therefore, it was felt that she likely had a stress induced cardiomyopathy secondary to  her other acute illnesses.  She was not anticoagulated at the time for her atrial  fibrillation  secondary to significant thrombocytopenia.      The patient later presented in May 2021 with rapid atrial fibrillation.  Her medications   were adjusted, including the addition of digoxin to carvedilol.  She was ultimately   anticoagulated with low-dose Eliquis based on her body habitus and age.  An echo  obtained on 5/13/2021 showed an ejection fraction of 55 to 60%, moderate left atrial   dilatation, and mild aortic stenosis.     The patient presents today for follow-up.  She really does not feel the atrial fibrillation,   although she states that she has been more short of breath.  She has noticed this   when she has been playing golf and going up stairs.  She states that most of the   symptoms are with exertion, and she often has pain in her mid upper back which   accompanies the shortness of breath with exertion.  She states that usually after 15   to 20 minutes of rest, the symptoms will resolve.    Past Medical History:   Diagnosis Date   • Anemia of chronic disorder    • Biliary dyskinesia    • Chronic deep venous thrombosis (HCC)     chronic bilateral DVT   • Chronic kidney disease    • Chronic pancreatitis (HCC)     ACUTE PANCREATITIS 11/20/2013   • Diverticulosis    • Esophageal reflux    • Full dentures    • Hepatomegaly     LIKELY SECONDARY TO ALCOHOL USE   • History of alcohol use     quit 11/13   • History of transfusion    • Hypertension    • IBS (irritable bowel syndrome)     with diarrhea   • Left foot drop     LEFT FOOD DROP SECONDARY TO PERONEAL NERVE INJURY   • Osteoarthritis     OSTEO   • PAF (paroxysmal atrial fibrillation) (HCC)    • Peripheral neuropathy     LEFT LOWER EXTREMITY   • Personal history of gallstones    • Personal history of malignant neoplasm of skin     S/P REMOVAL FROM LEFT KNEE   • Primary osteoarthritis of both hands    • Takotsubo syndrome     Likely secondary to acute illness in 11/13.  EF as low as 20-25% 11/27/13.  EF 63% by echo 4/14/14.   •  Thrombocytopenia (HCC)        Past Surgical History:   Procedure Laterality Date   • CHOLECYSTECTOMY     • CORNEAL TRANSPLANT     • MULTIPLE TOOTH EXTRACTIONS      FULL MOUTH TEETH REMOVED   • TOTAL SHOULDER ARTHROPLASTY W/ DISTAL CLAVICLE EXCISION Right 11/16/2017    Procedure: Reverse Total Shoulder Arthroplasty;  Surgeon: Sabino Harley MD;  Location: [facility];  Service:        Current Outpatient Medications on File Prior to Visit   Medication Sig Dispense Refill   • apixaban (ELIQUIS) 2.5 MG tablet tablet Take 1 tablet by mouth Every 12 (Twelve) Hours. Indications: Atrial Fibrillation 60 tablet 2   • carvedilol (COREG) 25 MG tablet Take 1 tablet by mouth 2 (Two) Times a Day With Meals. 60 tablet 2   • digoxin (LANOXIN) 250 MCG tablet Take 1 tablet by mouth Daily. (Patient taking differently: Take 125 mcg by mouth Daily.) 30 tablet 3   • ferrous sulfate 325 (65 FE) MG EC tablet Take 325 mg by mouth Daily With Breakfast.     • fluorometholone (FML) 0.1 % ophthalmic suspension SHAKE GENTLY AND INSTILL ONE DROP IN RIGHT EYE BID.  3   • furosemide (Lasix) 20 MG tablet Take 1 tablet by mouth Daily.     • methotrexate 2.5 MG tablet TAKE 5 TABLETS BY MOUTH EVERY WEEK     • ofloxacin (OCUFLOX) 0.3 % ophthalmic solution INSTILL 1 DROP INTO RIGHT EYE 3 TIMES A DAY FOR 1 WEEK  0   • pantoprazole (PROTONIX) 40 MG EC tablet Take 1 tablet by mouth daily. 90 tablet 3   • potassium chloride ER (K-TAB) 20 MEQ tablet controlled-release ER tablet Take 1 tablet by mouth Daily. 90 tablet 1   • acetaminophen (TYLENOL) 325 MG tablet Take 2 tablets by mouth Every 4 (Four) Hours As Needed for Mild Pain .       No current facility-administered medications on file prior to visit.     Allergies as of 10/18/2021   • (No Known Allergies)     Social History     Socioeconomic History   • Marital status: Single   Tobacco Use   • Smoking status: Never Smoker   • Smokeless tobacco: Never Used   Vaping Use   • Vaping Use: Never used  "  Substance and Sexual Activity   • Alcohol use: No     Comment: caffiene: coffee pot daily   • Drug use: No   • Sexual activity: Defer     Family History   Problem Relation Age of Onset   • Alcohol abuse Mother    • Other Mother         CORONARY ARTERIOSCLEROSIS   • Cancer Father         prostate   • Malig Hyperthermia Neg Hx        Review of Systems   Respiratory: Positive for shortness of breath.    Musculoskeletal: Positive for back pain.   All other systems reviewed and are negative.     Objective:     Vitals:    10/18/21 1320   BP: 110/78   Pulse: 95   Resp: 18   SpO2: 98%   Weight: 49.9 kg (110 lb)   Height: 63 cm (24.8\")     Body mass index is 125.71 kg/m².    Physical Exam  Constitutional:       Appearance: She is well-developed.   HENT:      Head: Normocephalic and atraumatic.   Eyes:      Conjunctiva/sclera: Conjunctivae normal.   Cardiovascular:      Rate and Rhythm: Normal rate. Rhythm irregularly irregular.      Heart sounds: Murmur heard.    Systolic murmur is present with a grade of 3/6 at the upper right sternal border and upper left sternal border.  No friction rub. No gallop.    Pulmonary:      Effort: Pulmonary effort is normal.      Breath sounds: Normal breath sounds.   Abdominal:      Palpations: Abdomen is soft.      Tenderness: There is no abdominal tenderness.   Musculoskeletal:      Cervical back: Neck supple.   Skin:     General: Skin is warm.   Neurological:      Mental Status: She is alert and oriented to person, place, and time.   Psychiatric:         Behavior: Behavior normal.       Lab Review:   Procedures    Assessment:       Diagnosis Plan   1. Midline thoracic back pain, unspecified chronicity  Stress Test With Myocardial Perfusion One Day   2. Shortness of breath  Stress Test With Myocardial Perfusion One Day   3. PAF (paroxysmal atrial fibrillation) ([facility])  Holter Monitor - 24 Hour   4. Nonrheumatic aortic valve stenosis     5. Takotsubo cardiomyopathy       Plan:       She has " been having exertional dyspnea and upper back discomfort.  I suspect that she has moved more into a persistent pattern of atrial fibrillation.  Her rate today is 95, and I want to make sure the rate is controlled.  I am going to check a Holter monitor.  For now, she is going to continue on the carvedilol at 25 mg twice a day and the digoxin at 125 mcg/day.    More importantly, the symptoms could represent an anginal equivalent.  She does have rheumatoid arthritis, which automatically places her in a high risk category for coronary artery disease.  I am going to check a [employer]isc[employer] Myoview stress test at this point.  She will continue on the Lasix at 20 mg/day.  She has not had any bleeding issues taking the Eliquis 2.5 mg twice a day.  She is taking the reduced dose based on her body habitus and age.  Until the stress test, I have also asked her to start taking an aspirin 81 mg/day.  The patient was in agreement with this plan, and further plans will be made after the testing.

## 2021-10-21 ENCOUNTER — HOSPITAL ENCOUNTER (OUTPATIENT)
Dept: CARDIOLOGY | Facility: HOSPITAL | Age: 81
Discharge: HOME | End: 2021-10-21
Admitting: INTERNAL MEDICINE

## 2021-10-21 VITALS — WEIGHT: 110.01 LBS | BODY MASS INDEX: 19.49 KG/M2 | HEIGHT: 63 IN

## 2021-10-21 DIAGNOSIS — R06.02 SHORTNESS OF BREATH: ICD-10-CM

## 2021-10-21 DIAGNOSIS — M54.6 MIDLINE THORACIC BACK PAIN, UNSPECIFIED CHRONICITY: ICD-10-CM

## 2021-10-21 LAB
BH CV NUCLEAR PRIOR STUDY: 2
BH CV REST NUCLEAR ISOTOPE DOSE: 10.6 MCI
BH CV STRESS BP STAGE 1: NORMAL
BH CV STRESS COMMENTS STAGE 1: NORMAL
BH CV STRESS DOSE REGADENOSON STAGE 1: 0.4
BH CV STRESS DURATION MIN STAGE 1: 0
BH CV STRESS DURATION SEC STAGE 1: 10
BH CV STRESS HR STAGE 1: 150
BH CV STRESS NUCLEAR ISOTOPE DOSE: 32.6 MCI
BH CV STRESS PROTOCOL 1: NORMAL
BH CV STRESS RECOVERY BP: NORMAL MMHG
BH CV STRESS RECOVERY HR: 113 BPM
BH CV STRESS STAGE 1: 1
LV EF NUC BP: 70 %
MAXIMAL PREDICTED HEART RATE: 138 BPM
PERCENT MAX PREDICTED HR: 108.7 %
STRESS BASELINE BP: NORMAL MMHG
STRESS BASELINE HR: 100 BPM
STRESS PERCENT HR: 128 %
STRESS POST EXERCISE DUR SEC: 10 SEC
STRESS POST PEAK BP: NORMAL MMHG
STRESS POST PEAK HR: 150 BPM
STRESS TARGET HR: 117 BPM

## 2021-10-21 PROCEDURE — 93016 CV STRESS TEST SUPVJ ONLY: CPT | Performed by: INTERNAL MEDICINE

## 2021-10-21 PROCEDURE — 25010000002 REGADENOSON 0.4 MG/5ML SOLUTION: Performed by: INTERNAL MEDICINE

## 2021-10-21 PROCEDURE — 0 TECHNETIUM TETROFOSMIN KIT: Performed by: INTERNAL MEDICINE

## 2021-10-21 PROCEDURE — 93018 CV STRESS TEST I&R ONLY: CPT | Performed by: INTERNAL MEDICINE

## 2021-10-21 PROCEDURE — 78452 HT MUSCLE IMAGE SPECT MULT: CPT

## 2021-10-21 PROCEDURE — 78452 HT MUSCLE IMAGE SPECT MULT: CPT | Performed by: INTERNAL MEDICINE

## 2021-10-21 PROCEDURE — A9502 TC99M TETROFOSMIN: HCPCS | Performed by: INTERNAL MEDICINE

## 2021-10-21 PROCEDURE — 93017 CV STRESS TEST TRACING ONLY: CPT

## 2021-10-21 RX ADMIN — TETROFOSMIN 1 DOSE: 1.38 INJECTION, POWDER, LYOPHILIZED, FOR SOLUTION INTRAVENOUS at 12:36

## 2021-10-21 RX ADMIN — TETROFOSMIN 1 DOSE: 1.38 INJECTION, POWDER, LYOPHILIZED, FOR SOLUTION INTRAVENOUS at 12:02

## 2021-10-21 RX ADMIN — REGADENOSON 0.4 MG: 0.08 INJECTION, SOLUTION INTRAVENOUS at 12:36

## 2021-10-21 NOTE — PROGRESS NOTES
Called and gave the patient the results.  No ischemia.  Await results of the 24-hour monitor.    CHIQUITA

## 2021-10-25 RX ORDER — DIGOXIN 250 MCG
TABLET ORAL
Qty: 90 TABLET | Refills: 2 | Status: SHIPPED | OUTPATIENT
Start: 2021-10-25 | End: 2022-07-11

## 2021-12-27 ENCOUNTER — OFFICE VISIT (OUTPATIENT)
Dept: CARDIOLOGY | Facility: CLINIC | Age: 81
End: 2021-12-27

## 2021-12-27 VITALS
RESPIRATION RATE: 15 BRPM | HEART RATE: 77 BPM | WEIGHT: 111 LBS | BODY MASS INDEX: 19.67 KG/M2 | SYSTOLIC BLOOD PRESSURE: 136 MMHG | HEIGHT: 63 IN | OXYGEN SATURATION: 98 % | DIASTOLIC BLOOD PRESSURE: 88 MMHG

## 2021-12-27 DIAGNOSIS — I51.81 TAKOTSUBO CARDIOMYOPATHY: ICD-10-CM

## 2021-12-27 DIAGNOSIS — Z79.899 HIGH RISK MEDICATION USE: ICD-10-CM

## 2021-12-27 DIAGNOSIS — I48.0 PAF (PAROXYSMAL ATRIAL FIBRILLATION) (HCC): Primary | ICD-10-CM

## 2021-12-27 PROCEDURE — 99214 OFFICE O/P EST MOD 30 MIN: CPT | Performed by: NURSE PRACTITIONER

## 2021-12-27 RX ORDER — LOTEPREDNOL ETABONATE 5 MG/ML
1 SUSPENSION/ DROPS OPHTHALMIC 2 TIMES DAILY
COMMUNITY
Start: 2021-11-16

## 2021-12-27 NOTE — PROGRESS NOTES
"    CARDIOLOGY        Patient Name: Bhumi Gimenez  :1940  Age: 81 y.o.  Primary Cardiologist: Michael Subramanian MD  Encounter Provider:  ANTONY Queen    Date of Service: 21          CHIEF COMPLAINT / REASON FOR OFFICE VISIT     No chief complaint on file.      HISTORY OF PRESENT ILLNESS       Atrial Fibrillation  Past medical history includes atrial fibrillation.     Bhumi Gimenez is a 81 y.o. female who presents today for 2-month reevaluation.    Pt has a  history significant for PAF, rheumatoid arthritis, hypertension, pancreatitis, irritable bowel syndrome.    Patient was evaluated by Dr. Palumbo several months ago where she was having episodes of chest discomfort.  She subsequently had a myocardial perfusion study which was negative for ischemia as well as a 24-hour Holter which was unremarkable.  Patient states that since that time she has not experienced any further episodes of chest discomfort.  She states that she walks 2 miles daily without any exertional symptoms and she continues to work part-time working 4 to 5 hours/day.  She states that she is currently asymptomatic and denies heart palpitations, shortness of breath, angina, dyspnea with exertion, lower extremity edema, lightheadedness, fatigue.  She is tolerating all medications without any adverse effects.    The following portions of the patient's history were reviewed and updated as appropriate: allergies, current medications, past family history, past medical history, past social history, past surgical history and problem list.      VITAL SIGNS     Visit Vitals  /88 (BP Location: Left arm, Patient Position: Sitting, Cuff Size: Adult)   Pulse 77   Resp 15   Ht 160 cm (62.99\")   Wt 50.3 kg (111 lb)   SpO2 98%   BMI 19.67 kg/m²         Wt Readings from Last 3 Encounters:   21 50.3 kg (111 lb)   10/21/21 49.9 kg (110 lb 0.2 oz)   10/18/21 49.9 kg (110 lb)     Body mass index is 19.67 kg/m².      REVIEW OF " SYSTEMS   Review of Systems   Constitutional: Negative for chills, fever, weight gain and weight loss.   Cardiovascular: Negative for leg swelling.   Respiratory: Negative for cough, snoring and wheezing.    Hematologic/Lymphatic: Negative for bleeding problem. Does not bruise/bleed easily.   Skin: Negative for color change.   Musculoskeletal: Negative for falls, joint pain and myalgias.   Gastrointestinal: Negative for melena.   Genitourinary: Negative for hematuria.   Neurological: Negative for excessive daytime sleepiness.   Psychiatric/Behavioral: Negative for depression. The patient is not nervous/anxious.            PHYSICAL EXAMINATION     Vitals and nursing note reviewed.   Constitutional:       Appearance: Normal appearance. Well-developed.   Eyes:      Conjunctiva/sclera: Conjunctivae normal.   Neck:      Vascular: No carotid bruit.   Pulmonary:      Breath sounds: Normal breath sounds.   Cardiovascular:      Irregularly irregular rhythm. Normal S1 with normal intensity. Normal S2 with normal intensity.      Murmurs: There is a grade 2/6 systolic murmur.      No gallop. No click. No rub.   Edema:     Peripheral edema absent.   Musculoskeletal: Normal range of motion. Skin:     General: Skin is warm and dry.   Neurological:      Mental Status: Alert and oriented to person, place, and time.      GCS: GCS eye subscore is 4. GCS verbal subscore is 5. GCS motor subscore is 6.   Psychiatric:         Speech: Speech normal.         Behavior: Behavior normal.         Thought Content: Thought content normal.         Judgment: Judgment normal.           REVIEWED DATA     Procedures      Cardiac Procedures:  1. Echocardiogram 5/13/2021.  LVEF 56-60%.  All LV wall segments contract normally.  LAE.  Mild to moderate mitral valve regurgitation.  Moderate aortic valve stenosis.  Mild to moderate tricuspid valve regurgitation with estimated RVSP less than 35 mmHg.    Lipid Panel    Lipid Panel 5/12/21   Total Cholesterol  157   Triglycerides 56   HDL Cholesterol 75 (A)   VLDL Cholesterol 12   LDL Cholesterol  70   LDL/HDL Ratio 0.94   (A) Abnormal value                ASSESSMENT & PLAN      Diagnosis Plan   1. PAF (paroxysmal atrial fibrillation) (HCC)     2. High risk medication use     3. Takotsubo cardiomyopathy           SUMMARY/DISCUSSION  1. PAF.  Heart rate currently controlled in the 70s.  Patient denies any heart palpitations, tachycardias.  She will continue carvedilol 25 mg twice per day, digoxin 250 mcg/day.  She is anticoagulated with apixaban and not having any bleeding complications.  2. High risk medication use.  Patient is on digoxin as well as apixaban.  3. Takotsubo cardiomyopathy.  Currently denies any shortness of breath, dyspnea with exertion, signs of volume overload.  Patient walks 3 to 4 miles per day without any limiting symptoms.  Continue carvedilol 25 mg twice per day and furosemide 20 mg/day.  4. Follow-up with Dr. Subramanian in 6 months as previously scheduled.        MEDICATIONS         Discharge Medications          Accurate as of December 27, 2021  9:05 AM. If you have any questions, ask your nurse or doctor.            Continue These Medications      Instructions Start Date   acetaminophen 325 MG tablet  Commonly known as: TYLENOL   650 mg, Oral, Every 4 Hours PRN      apixaban 2.5 MG tablet tablet  Commonly known as: ELIQUIS   2.5 mg, Oral, Every 12 Hours Scheduled      carvedilol 25 MG tablet  Commonly known as: COREG   25 mg, Oral, 2 Times Daily With Meals      digoxin 250 MCG tablet  Commonly known as: LANOXIN   TAKE 1 TABLET BY MOUTH EVERY DAY      ferrous sulfate 325 (65 FE) MG EC tablet   325 mg, Oral, Daily With Breakfast      fluorometholone 0.1 % ophthalmic suspension  Commonly known as: FML   SHAKE GENTLY AND INSTILL ONE DROP IN RIGHT EYE BID.      furosemide 20 MG tablet  Commonly known as: Lasix   20 mg, Oral, Daily      loteprednol 0.5 % ophthalmic suspension  Commonly known as:  LOTEMAX   1 drop, Both Eyes, 2 Times Daily      methotrexate 2.5 MG tablet   TAKE 5 TABLETS BY MOUTH EVERY WEEK      ofloxacin 0.3 % ophthalmic solution  Commonly known as: OCUFLOX   INSTILL 1 DROP INTO RIGHT EYE 3 TIMES A DAY FOR 1 WEEK      pantoprazole 40 MG EC tablet  Commonly known as: PROTONIX   40 mg, Oral, Daily      potassium chloride ER 20 MEQ tablet controlled-release ER tablet  Commonly known as: K-TAB   20 mEq, Oral, Daily                 **Dragon Disclaimer:   Much of this encounter note is an electronic transcription/translation of spoken language to printed text. The electronic translation of spoken language may permit erroneous, or at times, nonsensical words or phrases to be inadvertently transcribed. Although I have reviewed the note for such errors, some may still exist.

## 2022-01-31 ENCOUNTER — TELEPHONE (OUTPATIENT)
Dept: CARDIOLOGY | Facility: CLINIC | Age: 82
End: 2022-01-31

## 2022-06-02 ENCOUNTER — OFFICE VISIT (OUTPATIENT)
Dept: CARDIOLOGY | Facility: CLINIC | Age: 82
End: 2022-06-02

## 2022-06-02 VITALS
BODY MASS INDEX: 19.03 KG/M2 | SYSTOLIC BLOOD PRESSURE: 118 MMHG | OXYGEN SATURATION: 99 % | DIASTOLIC BLOOD PRESSURE: 72 MMHG | HEART RATE: 63 BPM | WEIGHT: 107.4 LBS | HEIGHT: 63 IN

## 2022-06-02 DIAGNOSIS — Z86.79 HISTORY OF CARDIOMYOPATHY: ICD-10-CM

## 2022-06-02 DIAGNOSIS — I35.0 NONRHEUMATIC AORTIC VALVE STENOSIS: ICD-10-CM

## 2022-06-02 DIAGNOSIS — I48.11 LONGSTANDING PERSISTENT ATRIAL FIBRILLATION: Primary | ICD-10-CM

## 2022-06-02 PROCEDURE — 99213 OFFICE O/P EST LOW 20 MIN: CPT | Performed by: INTERNAL MEDICINE

## 2022-06-02 NOTE — PROGRESS NOTES
Date of Office Visit: 2022  Encounter Provider: Parish Subramanian MD  Place of Service: [facility] CARDIOLOGY  Patient Name: Bhumi Gimenez  :1940    Chief complaint: Follow-up for persistent atrial fibrillation, aortic stenosis, history of   stress-induced cardiomyopathy.    History of Present Illness:    Dear Dr. Cheatham,     I again had the pleasure of seeing your patient in cardiology office on 2022.  As  you well know, she is a very pleasant 82 year-old white female with a past medical history  significant for multiple medical problems who presents for follow-up regarding  stress-induced cardiomyopathy and paroxysmal atrial fibrillation.  The patient has a very  complicated past medical history.  She was admitted on 2013 with acute on chronic  pancreatitis.  She does have a history of drinking 3-4 alcoholic beverages per day.  She  subsequently developed multiple medical issues while hospitalized, including acute on  chronic kidney disease, respiratory failure, and atrial fibrillation with rapid  ventricular response.  During her evaluation for the paroxysmal atrial fibrillation, she  had an echocardiogram performed on 2013 which showed an ejection fraction of  60%-65%.  However, on 2013, the patient developed severe EKG changes which   were concerning for a possible anterior myocardial infarction.  She also was noted to   have an elevated troponin at that time.  A follow up echocardiogram on 2013   showed an ejection fraction of 20%-25%.  The decision was made not to proceed with   cardiac cath secondary to significant thrombocytopenia and kidney injury.  However, the  patient's ejection fraction improved on 12/3/2013 and was noted to be 50%-55%.   Therefore, it was felt that she likely had a stress induced cardiomyopathy secondary to  her other acute illnesses.  She was not anticoagulated at the time for her atrial fibrillation  secondary  to significant thrombocytopenia.      The patient later presented in May 2021 with rapid atrial fibrillation.  Her medications   were adjusted, including the addition of digoxin to carvedilol.  She was ultimately   anticoagulated with low-dose Eliquis based on her body habitus and age.  An echo  obtained on 5/13/2021 showed an ejection fraction of 55 to 60%, moderate left atrial   dilatation, and mild aortic stenosis.     The patient presents today for follow-up.  Overall, she is doing very well.  She retired 3   weeks ago.  This has cut down on her stress level in general.  She is walking about 3   miles per day.  She still has some shortness of breath with going up steps and inclines,   although this has been her baseline.  She has not had any chest discomfort.  She   remains in rate controlled atrial fibrillation.      Past Medical History:   Diagnosis Date   • Anemia of chronic disorder    • Biliary dyskinesia    • Chronic deep venous thrombosis (HCC)     chronic bilateral DVT   • Chronic kidney disease    • Chronic pancreatitis (HCC)     ACUTE PANCREATITIS 11/20/2013   • Diverticulosis    • Esophageal reflux    • Full dentures    • Hepatomegaly     LIKELY SECONDARY TO ALCOHOL USE   • History of alcohol use     quit 11/13   • History of transfusion    • Hypertension    • IBS (irritable bowel syndrome)     with diarrhea   • Left foot drop     LEFT FOOD DROP SECONDARY TO PERONEAL NERVE INJURY   • Osteoarthritis     OSTEO   • PAF (paroxysmal atrial fibrillation) (HCC)    • Peripheral neuropathy     LEFT LOWER EXTREMITY   • Personal history of gallstones    • Personal history of malignant neoplasm of skin     S/P REMOVAL FROM LEFT KNEE   • Primary osteoarthritis of both hands    • Takotsubo syndrome     Likely secondary to acute illness in 11/13.  EF as low as 20-25% 11/27/13.  EF 63% by echo 4/14/14.   • Thrombocytopenia (HCC)        Past Surgical History:   Procedure Laterality Date   • CHOLECYSTECTOMY     •  CORNEAL TRANSPLANT     • MULTIPLE TOOTH EXTRACTIONS      FULL MOUTH TEETH REMOVED   • TOTAL SHOULDER ARTHROPLASTY W/ DISTAL CLAVICLE EXCISION Right 11/16/2017    Procedure: Reverse Total Shoulder Arthroplasty;  Surgeon: Sabino Harley MD;  Location: [facility];  Service:        Current Outpatient Medications on File Prior to Visit   Medication Sig Dispense Refill   • acetaminophen (TYLENOL) 325 MG tablet Take 2 tablets by mouth Every 4 (Four) Hours As Needed for Mild Pain .     • apixaban (ELIQUIS) 2.5 MG tablet tablet Take 1 tablet by mouth Every 12 (Twelve) Hours. Indications: Atrial Fibrillation 60 tablet 2   • carvedilol (COREG) 25 MG tablet Take 1 tablet by mouth 2 (Two) Times a Day With Meals. 60 tablet 2   • digoxin (LANOXIN) 250 MCG tablet TAKE 1 TABLET BY MOUTH EVERY DAY 90 tablet 2   • ferrous sulfate 325 (65 FE) MG EC tablet Take 325 mg by mouth Daily With Breakfast.     • fluorometholone (FML) 0.1 % ophthalmic suspension SHAKE GENTLY AND INSTILL ONE DROP IN RIGHT EYE BID.  3   • furosemide (Lasix) 20 MG tablet Take 1 tablet by mouth Daily.     • loteprednol (LOTEMAX) 0.5 % ophthalmic suspension Administer 1 drop to both eyes 2 (Two) Times a Day.     • methotrexate 2.5 MG tablet TAKE 5 TABLETS BY MOUTH EVERY WEEK     • ofloxacin (OCUFLOX) 0.3 % ophthalmic solution INSTILL 1 DROP INTO RIGHT EYE 3 TIMES A DAY FOR 1 WEEK  0   • pantoprazole (PROTONIX) 40 MG EC tablet Take 1 tablet by mouth daily. 90 tablet 3   • potassium chloride ER (K-TAB) 20 MEQ tablet controlled-release ER tablet Take 1 tablet by mouth Daily. 90 tablet 1     No current facility-administered medications on file prior to visit.     Allergies as of 06/02/2022   • (No Known Allergies)     Social History     Socioeconomic History   • Marital status: Single   Tobacco Use   • Smoking status: Never Smoker   • Smokeless tobacco: Never Used   Vaping Use   • Vaping Use: Never used   Substance and Sexual Activity   • Alcohol use: No      "Comment: caffiene: coffee pot daily   • Drug use: No   • Sexual activity: Defer     Family History   Problem Relation Age of Onset   • Alcohol abuse Mother    • Other Mother         CORONARY ARTERIOSCLEROSIS   • Cancer Father         prostate   • Malig Hyperthermia Neg Hx        Review of Systems   Respiratory: Positive for shortness of breath.    Musculoskeletal: Positive for back pain.   All other systems reviewed and are negative.     Objective:     Vitals:    06/02/22 0824   BP: 118/72   Pulse: 63   SpO2: 99%   Weight: 48.7 kg (107 lb 6.4 oz)   Height: 160 cm (62.99\")     Body mass index is 19.03 kg/m².    Physical Exam  Constitutional:       Appearance: She is well-developed.   HENT:      Head: Normocephalic and atraumatic.   Eyes:      Conjunctiva/sclera: Conjunctivae normal.   Cardiovascular:      Rate and Rhythm: Normal rate. Rhythm irregularly irregular.      Heart sounds: Murmur heard.    Systolic murmur is present with a grade of 3/6 at the upper right sternal border and upper left sternal border.    No friction rub. No gallop.   Pulmonary:      Effort: Pulmonary effort is normal.      Breath sounds: Normal breath sounds.   Abdominal:      Palpations: Abdomen is soft.      Tenderness: There is no abdominal tenderness.   Musculoskeletal:      Cervical back: Neck supple.   Skin:     General: Skin is warm.   Neurological:      Mental Status: She is alert and oriented to person, place, and time.   Psychiatric:         Behavior: Behavior normal.       Lab Review:   Procedures    Assessment:       Diagnosis Plan   1. Longstanding persistent atrial fibrillation (HCC)  Adult Transthoracic Echo Complete w/ Color, Spectral and Contrast if Necessary Per Protocol   2. Nonrheumatic aortic valve stenosis  Adult Transthoracic Echo Complete w/ Color, Spectral and Contrast if Necessary Per Protocol   3. History of cardiomyopathy       Plan:       Her rate is well controlled currently on the carvedilol at 25 mg twice a day " and the digoxin at 250 mcg/day.  She is relatively asymptomatic from the atrial fibrillation.  She will continue on the Eliquis at 2.5 mg every 12 hours.  This is the correct dose based on her age and body habitus.  She is euvolemic on the Lasix at 20 mg/day.  Her blood pressure is excellent at 118/72.  Her murmur did sound slightly more prominent today, and I am going to recheck an echocardiogram to reevaluate the aortic stenosis.  Otherwise, I will have her follow-up in 6 months.

## 2022-06-21 ENCOUNTER — APPOINTMENT (OUTPATIENT)
Dept: CARDIOLOGY | Facility: HOSPITAL | Age: 82
End: 2022-06-21

## 2022-06-22 ENCOUNTER — HOSPITAL ENCOUNTER (OUTPATIENT)
Dept: CARDIOLOGY | Facility: HOSPITAL | Age: 82
Discharge: HOME | End: 2022-06-22
Admitting: INTERNAL MEDICINE

## 2022-06-22 VITALS
DIASTOLIC BLOOD PRESSURE: 80 MMHG | WEIGHT: 107 LBS | BODY MASS INDEX: 18.96 KG/M2 | HEIGHT: 63 IN | HEART RATE: 80 BPM | SYSTOLIC BLOOD PRESSURE: 120 MMHG

## 2022-06-22 DIAGNOSIS — I48.11 LONGSTANDING PERSISTENT ATRIAL FIBRILLATION: ICD-10-CM

## 2022-06-22 DIAGNOSIS — I35.0 NONRHEUMATIC AORTIC VALVE STENOSIS: ICD-10-CM

## 2022-06-22 LAB
AORTIC ARCH: 2.7 CM
AORTIC DIMENSIONLESS INDEX: 0.4 (DI)
ASCENDING AORTA: 2.8 CM
BH CV ECHO MEAS - ACS: 1.33 CM
BH CV ECHO MEAS - AO MAX PG: 37.6 MMHG
BH CV ECHO MEAS - AO MEAN PG: 16 MMHG
BH CV ECHO MEAS - AO ROOT DIAM: 3.2 CM
BH CV ECHO MEAS - AO V2 MAX: 306.7 CM/SEC
BH CV ECHO MEAS - AO V2 VTI: 62.7 CM
BH CV ECHO MEAS - AVA(I,D): 1.67 CM2
BH CV ECHO MEAS - EDV(CUBED): 48.5 ML
BH CV ECHO MEAS - EDV(MOD-SP2): 53 ML
BH CV ECHO MEAS - EDV(MOD-SP4): 62 ML
BH CV ECHO MEAS - EF(MOD-BP): 61 %
BH CV ECHO MEAS - EF(MOD-SP2): 62.3 %
BH CV ECHO MEAS - EF(MOD-SP4): 62.9 %
BH CV ECHO MEAS - ESV(CUBED): 12.6 ML
BH CV ECHO MEAS - ESV(MOD-SP2): 20 ML
BH CV ECHO MEAS - ESV(MOD-SP4): 23 ML
BH CV ECHO MEAS - FS: 36.3 %
BH CV ECHO MEAS - IVS/LVPW: 0.8 CM
BH CV ECHO MEAS - IVSD: 0.91 CM
BH CV ECHO MEAS - LAT PEAK E' VEL: 10.4 CM/SEC
BH CV ECHO MEAS - LV DIASTOLIC VOL/BSA (35-75): 41.8 CM2
BH CV ECHO MEAS - LV MASS(C)D: 114.1 GRAMS
BH CV ECHO MEAS - LV MAX PG: 8.7 MMHG
BH CV ECHO MEAS - LV MEAN PG: 4.8 MMHG
BH CV ECHO MEAS - LV SYSTOLIC VOL/BSA (12-30): 15.5 CM2
BH CV ECHO MEAS - LV V1 MAX: 147.4 CM/SEC
BH CV ECHO MEAS - LV V1 VTI: 31.9 CM
BH CV ECHO MEAS - LVIDD: 3.6 CM
BH CV ECHO MEAS - LVIDS: 2.32 CM
BH CV ECHO MEAS - LVOT AREA: 3.3 CM2
BH CV ECHO MEAS - LVOT DIAM: 2.04 CM
BH CV ECHO MEAS - LVPWD: 1.14 CM
BH CV ECHO MEAS - MED PEAK E' VEL: 8.1 CM/SEC
BH CV ECHO MEAS - MR MAX PG: 125.5 MMHG
BH CV ECHO MEAS - MR MAX VEL: 560.1 CM/SEC
BH CV ECHO MEAS - MV DEC SLOPE: 721.1 CM/SEC2
BH CV ECHO MEAS - MV DEC TIME: 0.15 MSEC
BH CV ECHO MEAS - MV E MAX VEL: 113 CM/SEC
BH CV ECHO MEAS - MV MAX PG: 7.9 MMHG
BH CV ECHO MEAS - MV MEAN PG: 2.02 MMHG
BH CV ECHO MEAS - MV P1/2T: 59.4 MSEC
BH CV ECHO MEAS - MV V2 VTI: 37.5 CM
BH CV ECHO MEAS - MVA(P1/2T): 3.7 CM2
BH CV ECHO MEAS - MVA(VTI): 2.8 CM2
BH CV ECHO MEAS - PA ACC TIME: 0.11 SEC
BH CV ECHO MEAS - PA PR(ACCEL): 29.9 MMHG
BH CV ECHO MEAS - PA V2 MAX: 130.3 CM/SEC
BH CV ECHO MEAS - PI END-D VEL: 105.9 CM/SEC
BH CV ECHO MEAS - PULM DIAS VEL: 70.3 CM/SEC
BH CV ECHO MEAS - PULM S/D: 0.85
BH CV ECHO MEAS - PULM SYS VEL: 59.6 CM/SEC
BH CV ECHO MEAS - QP/QS: 0.37
BH CV ECHO MEAS - RAP SYSTOLE: 8 MMHG
BH CV ECHO MEAS - RV MAX PG: 2.07 MMHG
BH CV ECHO MEAS - RV V1 MAX: 72 CM/SEC
BH CV ECHO MEAS - RV V1 VTI: 17.7 CM
BH CV ECHO MEAS - RVOT DIAM: 1.67 CM
BH CV ECHO MEAS - RVSP: 37 MMHG
BH CV ECHO MEAS - SI(MOD-SP2): 22.3 ML/M2
BH CV ECHO MEAS - SI(MOD-SP4): 26.3 ML/M2
BH CV ECHO MEAS - SUP REN AO DIAM: 1.9 CM
BH CV ECHO MEAS - SV(LVOT): 104.9 ML
BH CV ECHO MEAS - SV(MOD-SP2): 33 ML
BH CV ECHO MEAS - SV(MOD-SP4): 39 ML
BH CV ECHO MEAS - SV(RVOT): 38.9 ML
BH CV ECHO MEAS - TAPSE (>1.6): 1.88 CM
BH CV ECHO MEAS - TR MAX PG: 25.1 MMHG
BH CV ECHO MEAS - TR MAX VEL: 250.4 CM/SEC
BH CV ECHO MEASUREMENTS AVERAGE E/E' RATIO: 12.22
BH CV XLRA - RV BASE: 3.4 CM
BH CV XLRA - RV LENGTH: 5.4 CM
BH CV XLRA - RV MID: 2.2 CM
BH CV XLRA - TDI S': 13.7 CM/SEC
LEFT ATRIUM VOLUME INDEX: 54.9 ML/M2
MAXIMAL PREDICTED HEART RATE: 137 BPM
SINUS: 3.2 CM
STJ: 2.7 CM
STRESS TARGET HR: 116 BPM

## 2022-06-22 PROCEDURE — 93306 TTE W/DOPPLER COMPLETE: CPT | Performed by: INTERNAL MEDICINE

## 2022-06-22 PROCEDURE — 93306 TTE W/DOPPLER COMPLETE: CPT

## 2022-06-22 NOTE — PROGRESS NOTES
Called and gave her the results.  The aortic stenosis is unchanged.  The echocardiogram is very similar to the last echo.  No need for intervention of the aortic valve at this point.    CHIQUITA

## 2022-06-29 ENCOUNTER — TRANSCRIBE ORDERS (OUTPATIENT)
Dept: ADMINISTRATIVE | Facility: HOSPITAL | Age: 82
End: 2022-06-29

## 2022-06-29 ENCOUNTER — HOSPITAL ENCOUNTER (OUTPATIENT)
Dept: CT IMAGING | Facility: HOSPITAL | Age: 82
Discharge: HOME | End: 2022-06-29
Admitting: INTERNAL MEDICINE

## 2022-06-29 DIAGNOSIS — R10.11 RIGHT UPPER QUADRANT PAIN: ICD-10-CM

## 2022-06-29 DIAGNOSIS — R14.0 BLOATED ABDOMEN: Primary | ICD-10-CM

## 2022-06-29 DIAGNOSIS — R14.0 BLOATED ABDOMEN: ICD-10-CM

## 2022-06-29 LAB — CREAT BLDA-MCNC: 0.7 MG/DL (ref 0.6–1.3)

## 2022-06-29 PROCEDURE — 0 DIATRIZOATE MEGLUMINE & SODIUM PER 1 ML: Performed by: INTERNAL MEDICINE

## 2022-06-29 PROCEDURE — 82565 ASSAY OF CREATININE: CPT

## 2022-06-29 PROCEDURE — 25010000002 IOPAMIDOL 61 % SOLUTION: Performed by: INTERNAL MEDICINE

## 2022-06-29 PROCEDURE — 74177 CT ABD & PELVIS W/CONTRAST: CPT

## 2022-06-29 RX ADMIN — DIATRIZOATE MEGLUMINE AND DIATRIZOATE SODIUM 30 ML: 660; 100 LIQUID ORAL; RECTAL at 09:55

## 2022-06-29 RX ADMIN — IOPAMIDOL 80 ML: 612 INJECTION, SOLUTION INTRAVENOUS at 11:41

## 2022-07-11 RX ORDER — DIGOXIN 250 MCG
TABLET ORAL
Qty: 90 TABLET | Refills: 2 | Status: SHIPPED | OUTPATIENT
Start: 2022-07-11

## 2022-07-13 ENCOUNTER — TRANSCRIBE ORDERS (OUTPATIENT)
Dept: ADMINISTRATIVE | Facility: HOSPITAL | Age: 82
End: 2022-07-13

## 2022-07-13 DIAGNOSIS — R10.11 ABDOMINAL PAIN, RIGHT UPPER QUADRANT: Primary | ICD-10-CM

## 2022-08-02 ENCOUNTER — APPOINTMENT (OUTPATIENT)
Dept: CT IMAGING | Facility: HOSPITAL | Age: 82
End: 2022-08-02

## 2022-08-16 ENCOUNTER — OFFICE VISIT (OUTPATIENT)
Dept: SURGERY | Facility: CLINIC | Age: 82
End: 2022-08-16

## 2022-08-16 VITALS — HEIGHT: 64 IN | BODY MASS INDEX: 18.61 KG/M2 | WEIGHT: 109 LBS

## 2022-08-16 DIAGNOSIS — R10.11 RIGHT UPPER QUADRANT ABDOMINAL PAIN: ICD-10-CM

## 2022-08-16 DIAGNOSIS — K57.10 DUODENAL DIVERTICULUM: Primary | ICD-10-CM

## 2022-08-16 DIAGNOSIS — Z12.11 SCREENING FOR COLON CANCER: ICD-10-CM

## 2022-08-16 PROCEDURE — 99204 OFFICE O/P NEW MOD 45 MIN: CPT | Performed by: SURGERY

## 2022-08-16 RX ORDER — SUCRALFATE 1 G/1
1 TABLET ORAL 4 TIMES DAILY
COMMUNITY
End: 2023-02-08 | Stop reason: ALTCHOICE

## 2022-08-16 RX ORDER — ASPIRIN 81 MG/1
81 TABLET ORAL DAILY
COMMUNITY

## 2022-08-16 NOTE — PROGRESS NOTES
Subjective   Bhumi Gimenez is a 82 y.o. female who presents to the office in surgical consultation from Niko Cheatham MD for recent abdominal pain.    History of Present Illness     The patient has a previous history of acute and chronic pancreatitis.  She was hospitalized in November 2013 with acute pancreatitis that was complicated by cardiogenic shock and acute kidney failure that required dialysis.  She was hospitalized again March 2014 with acute on chronic pancreatitis.  She was drinking alcohol regularly at that time and has since stopped.  She has also had a cholecystectomy.    She recently developed right upper quadrant abdominal pain that were different than her previous symptoms of pancreatitis.  She underwent a CT scan of the abdomen and pelvis on 6/29/2022 that showed pancreatic calcifications consistent with chronic pancreatitis but no evidence of acute pancreatitis.  It also showed a large, 5 cm, duodenal diverticulum with mild thickening thought to represent duodenitis.  A similar finding was present on a CT scan of the abdomen and pelvis on 5/13/2021.  She has been on pantoprazole and Carafate was added to her routine and her symptoms completely resolved.  She now is feeling well with no abdominal pain.    She has never had a previous colonoscopy.  She has no problems with constipation.    She is active and plays golf several times a week.  She is able to walk without any symptoms.    Review of Systems   Constitutional: Negative for fatigue and fever.   Respiratory: Negative for chest tightness and shortness of breath.    Cardiovascular: Negative for chest pain and palpitations.   Gastrointestinal: Negative for abdominal pain, blood in stool, constipation, diarrhea, nausea and vomiting.     Past Medical History:   Diagnosis Date   • Anemia of chronic disorder    • Biliary dyskinesia    • Chronic deep venous thrombosis (HCC)     chronic bilateral DVT   • Chronic kidney disease    • Chronic  pancreatitis (HCC)     ACUTE PANCREATITIS 11/20/2013   • Diverticulosis    • Esophageal reflux    • Full dentures    • Hepatomegaly     LIKELY SECONDARY TO ALCOHOL USE   • History of alcohol use     quit 11/13   • History of transfusion    • Hypertension    • IBS (irritable bowel syndrome)     with diarrhea   • Left foot drop     LEFT FOOD DROP SECONDARY TO PERONEAL NERVE INJURY   • Osteoarthritis     OSTEO   • PAF (paroxysmal atrial fibrillation) (HCC)    • Peripheral neuropathy     LEFT LOWER EXTREMITY   • Personal history of gallstones    • Personal history of malignant neoplasm of skin     S/P REMOVAL FROM LEFT KNEE   • Primary osteoarthritis of both hands    • Takotsubo syndrome     Likely secondary to acute illness in 11/13.  EF as low as 20-25% 11/27/13.  EF 63% by echo 4/14/14.   • Thrombocytopenia (HCC)      Past Surgical History:   Procedure Laterality Date   • CHOLECYSTECTOMY     • CORNEAL TRANSPLANT     • MULTIPLE TOOTH EXTRACTIONS      FULL MOUTH TEETH REMOVED   • TOTAL SHOULDER ARTHROPLASTY W/ DISTAL CLAVICLE EXCISION Right 11/16/2017    Procedure: Reverse Total Shoulder Arthroplasty;  Surgeon: Sabino Harley MD;  Location: [facility];  Service:      Family History   Problem Relation Age of Onset   • Alcohol abuse Mother    • Other Mother         CORONARY ARTERIOSCLEROSIS   • Cancer Father         prostate   • Malig Hyperthermia Neg Hx      Social History     Socioeconomic History   • Marital status: Single   Tobacco Use   • Smoking status: Never Smoker   • Smokeless tobacco: Never Used   Vaping Use   • Vaping Use: Never used   Substance and Sexual Activity   • Alcohol use: No     Comment: caffiene: coffee pot daily   • Drug use: No   • Sexual activity: Defer       Objective   Physical Exam  Constitutional:       Appearance: Normal appearance. She is well-developed. She is not toxic-appearing.   Eyes:      General: No scleral icterus.  Pulmonary:      Effort: Pulmonary effort is normal. No  respiratory distress.   Abdominal:      Palpations: Abdomen is soft.      Tenderness: There is no abdominal tenderness.   Skin:     General: Skin is warm and dry.   Neurological:      Mental Status: She is alert and oriented to person, place, and time.   Psychiatric:         Behavior: Behavior normal.         Thought Content: Thought content normal.         Judgment: Judgment normal.         Assessment & Plan     1.  Duodenal diverticulum: The patient recently had right upper quadrant abdominal pain and was found to have a duodenal diverticulum on CT scan of the abdomen and pelvis.  It has been present on previous studies.  She was treated with Carafate and her symptoms improved.  She will need an EGD to further evaluate the duodenum and the diverticulum.    2.  Need for screening colonoscopy: The patient has never had a previous colonoscopy.  She has no significant GI symptoms but will need to have a screening colonoscopy.    3.  Chronic pancreatitis: The patient has chronic pancreatitis but is not experiencing symptoms.  She currently does not drink alcohol.  She has previously had a cholecystectomy.  There is no need for further management at this time.

## 2022-09-08 ENCOUNTER — ANESTHESIA EVENT (OUTPATIENT)
Dept: GASTROENTEROLOGY | Facility: HOSPITAL | Age: 82
End: 2022-09-08

## 2022-09-08 ENCOUNTER — ANESTHESIA (OUTPATIENT)
Dept: GASTROENTEROLOGY | Facility: HOSPITAL | Age: 82
End: 2022-09-08

## 2022-09-08 ENCOUNTER — HOSPITAL ENCOUNTER (OUTPATIENT)
Facility: HOSPITAL | Age: 82
Setting detail: HOSPITAL OUTPATIENT SURGERY
Discharge: HOME | End: 2022-09-08
Attending: SURGERY | Admitting: SURGERY

## 2022-09-08 VITALS
HEIGHT: 63 IN | TEMPERATURE: 98.2 F | HEART RATE: 69 BPM | WEIGHT: 105 LBS | SYSTOLIC BLOOD PRESSURE: 126 MMHG | RESPIRATION RATE: 17 BRPM | OXYGEN SATURATION: 100 % | DIASTOLIC BLOOD PRESSURE: 84 MMHG | BODY MASS INDEX: 18.61 KG/M2

## 2022-09-08 DIAGNOSIS — R10.11 RIGHT UPPER QUADRANT ABDOMINAL PAIN: ICD-10-CM

## 2022-09-08 DIAGNOSIS — Z12.11 SCREENING FOR COLON CANCER: ICD-10-CM

## 2022-09-08 DIAGNOSIS — K57.10 DUODENAL DIVERTICULUM: ICD-10-CM

## 2022-09-08 PROCEDURE — 25010000002 PROPOFOL 10 MG/ML EMULSION: Performed by: ANESTHESIOLOGY

## 2022-09-08 PROCEDURE — 43239 EGD BIOPSY SINGLE/MULTIPLE: CPT | Performed by: SURGERY

## 2022-09-08 PROCEDURE — G0121 COLON CA SCRN NOT HI RSK IND: HCPCS | Performed by: SURGERY

## 2022-09-08 PROCEDURE — 88305 TISSUE EXAM BY PATHOLOGIST: CPT | Performed by: SURGERY

## 2022-09-08 RX ORDER — LIDOCAINE HYDROCHLORIDE 20 MG/ML
INJECTION, SOLUTION INFILTRATION; PERINEURAL AS NEEDED
Status: DISCONTINUED | OUTPATIENT
Start: 2022-09-08 | End: 2022-09-08 | Stop reason: SURG

## 2022-09-08 RX ORDER — SODIUM CHLORIDE, SODIUM LACTATE, POTASSIUM CHLORIDE, CALCIUM CHLORIDE 600; 310; 30; 20 MG/100ML; MG/100ML; MG/100ML; MG/100ML
30 INJECTION, SOLUTION INTRAVENOUS CONTINUOUS PRN
Status: DISCONTINUED | OUTPATIENT
Start: 2022-09-08 | End: 2022-09-08 | Stop reason: HOSPADM

## 2022-09-08 RX ORDER — PROPOFOL 10 MG/ML
VIAL (ML) INTRAVENOUS AS NEEDED
Status: DISCONTINUED | OUTPATIENT
Start: 2022-09-08 | End: 2022-09-08 | Stop reason: SURG

## 2022-09-08 RX ADMIN — SODIUM CHLORIDE, POTASSIUM CHLORIDE, SODIUM LACTATE AND CALCIUM CHLORIDE 30 ML/HR: 600; 310; 30; 20 INJECTION, SOLUTION INTRAVENOUS at 09:19

## 2022-09-08 RX ADMIN — LIDOCAINE HYDROCHLORIDE 60 MG: 20 INJECTION, SOLUTION INFILTRATION; PERINEURAL at 09:44

## 2022-09-08 RX ADMIN — PROPOFOL 30 MG: 10 INJECTION, EMULSION INTRAVENOUS at 09:47

## 2022-09-08 RX ADMIN — PROPOFOL 30 MG: 10 INJECTION, EMULSION INTRAVENOUS at 09:58

## 2022-09-08 RX ADMIN — PROPOFOL 30 MG: 10 INJECTION, EMULSION INTRAVENOUS at 09:50

## 2022-09-08 RX ADMIN — PROPOFOL 120 MCG/KG/MIN: 10 INJECTION, EMULSION INTRAVENOUS at 09:50

## 2022-09-08 RX ADMIN — PROPOFOL 50 MG: 10 INJECTION, EMULSION INTRAVENOUS at 09:44

## 2022-09-08 NOTE — OP NOTE
Surgeon:     Jhonatan Casey Jr., M.D.    Preoperative Diagnosis:     1.  Duodenal diverticulum  2.  Need for screening colonoscopy    Postoperative Diagnosis:     1.  Esophagitis  2.  Mild gastritis    Procedure Performed:     1.  EGD with esophageal biopsy and gastric biopsy    Indications:     The patient is a pleasant 82-year-old female with a history of pancreatitis who had a CT scan of the abdomen and pelvis that showed a large duodenal diverticulum.  She has also never had a colonoscopy.  She presents for EGD and colonoscopy.  The patient understands the indications, alternatives, risks, and benefits of the procedure and wishes to proceed.    Procedure:     The patient was identified, taken to the endoscopy suite, and place in the left side down decubitus procedure.  The patient underwent a MAC anesthesia and was appropriately monitored through the case by the anesthesia personnel.  A biteblock was placed and the gastroscope was introduced into the oropharynx and advanced into the esophagus, through the esophagogastric junction, and into the stomach.  The gastroscope was then advanced through the pylorus into the duodenal bulb, and on to the second and third portion of the duodenum.  It was then withdrawn into the stomach.  All areas were carefully examined.  The stomach was decompressed and the scope was withdrawn.  The patient tolerated the procedure well.  Attention was then turned to the colonoscopy.  A rectal exam was performed that was normal.  The colonoscope was introduced into the rectum and advanced very carefully to the cecum that was identified by the cecal strap, ileocecal valve, and the appendiceal orifice.  The scope was then slowly withdrawn with careful circumferential examination of the mucosa performed.  The bowel prep was good allowing adequate visualization of mucosal surfaces.  The scope was withdrawn.  The patient tolerated the procedure well and was transferred the recovery area in  stable condition.    Findings:    There was esophagitis that was suspicious for Kulkarni's esophagus.  A biopsy of the esophagitis was performed with cold biopsy forceps and retrieved.    There was mild gastritis in the region of the antrum and a biopsy was taken to evaluate for H. Pylori.    There was no evidence of a duodenal diverticulum.    There are no abnormalities identified in the colon.    Recommendations:     1.  Await pathology results from the esophageal and gastric biopsy.  2.  No need to repeat a colonoscopy.    Jhonatan Casey Jr., M.D.

## 2022-09-08 NOTE — ANESTHESIA POSTPROCEDURE EVALUATION
"Patient: Bhumi Gimenez    Procedure Summary     Date: 09/08/22 Room / Location: [facility] ENDOSCOPY 9 / [facility] ENDOSCOPY    Anesthesia Start: 0939 Anesthesia Stop: 1027    Procedures:       ESOPHAGOGASTRODUODENOSCOPY with cold biopsies (N/A Esophagus)      COLONOSCOPY to cecum (N/A ) Diagnosis:       Duodenal diverticulum      Right upper quadrant abdominal pain      Screening for colon cancer      (Duodenal diverticulum [K57.10])      (Right upper quadrant abdominal pain [R10.11])      (Screening for colon cancer [Z12.11])    Surgeons: Jhonatan Casey Jr., MD Provider: Junior Urbano MD    Anesthesia Type: MAC ASA Status: 3          Anesthesia Type: MAC    Vitals  Vitals Value Taken Time   /84 09/08/22 1045   Temp     Pulse 69 09/08/22 1045   Resp 17 09/08/22 1045   SpO2 100 % 09/08/22 1045           Post Anesthesia Care and Evaluation    Pain management: adequate    Airway patency: patent  Anesthetic complications: No anesthetic complications    Cardiovascular status: acceptable  Respiratory status: acceptable  Hydration status: acceptable    Comments: /84   Pulse 69   Temp 36.8 °C (98.2 °F) (Oral)   Resp 17   Ht 160 cm (63\")   Wt 47.6 kg (105 lb)   SpO2 100%   BMI 18.60 kg/m²         "

## 2022-09-08 NOTE — ANESTHESIA PREPROCEDURE EVALUATION
Anesthesia Evaluation                  Airway   Mallampati: II  Dental      Pulmonary    (-) asthma, sleep apnea, not a smoker    ROS comment: Negative patient screen for PARAG    Cardiovascular     (+) hypertension, valvular problems/murmurs AS, dysrhythmias Paroxysmal Atrial Fib, DVT,       Neuro/Psych  GI/Hepatic/Renal/Endo    (+)  GERD,  liver disease, renal disease CRI,     Musculoskeletal     Abdominal    Substance History      OB/GYN          Other   arthritis,                      Anesthesia Plan    ASA 3     MAC       Anesthetic plan, risks, benefits, and alternatives have been provided, discussed and informed consent has been obtained with: patient.        CODE STATUS:

## 2022-09-09 LAB
LAB AP CASE REPORT: NORMAL
PATH REPORT.FINAL DX SPEC: NORMAL
PATH REPORT.GROSS SPEC: NORMAL

## 2022-09-26 ENCOUNTER — TELEPHONE (OUTPATIENT)
Dept: SURGERY | Facility: CLINIC | Age: 82
End: 2022-09-26

## 2022-09-26 NOTE — TELEPHONE ENCOUNTER
Called and spoke with patient regarding biopsies for her upper and lower scope and that they were benign  and that nothing else needs to be done.

## 2022-09-26 NOTE — TELEPHONE ENCOUNTER
----- Message from Jhonatan Casey Jr., MD sent at 9/23/2022  6:20 PM EDT -----  Please contact this patient and let her know that all of her biopsies from the upper scope are benign and nothing needs to be treated.  She does not need a repeat upper or lower scope.  Thanks

## 2023-02-08 ENCOUNTER — OFFICE VISIT (OUTPATIENT)
Dept: CARDIOLOGY | Facility: CLINIC | Age: 83
End: 2023-02-08
Payer: MEDICARE

## 2023-02-08 VITALS
DIASTOLIC BLOOD PRESSURE: 72 MMHG | OXYGEN SATURATION: 100 % | SYSTOLIC BLOOD PRESSURE: 120 MMHG | BODY MASS INDEX: 18.37 KG/M2 | WEIGHT: 107.6 LBS | HEART RATE: 61 BPM | HEIGHT: 64 IN

## 2023-02-08 DIAGNOSIS — I51.81 TAKOTSUBO CARDIOMYOPATHY: ICD-10-CM

## 2023-02-08 DIAGNOSIS — I35.0 NONRHEUMATIC AORTIC VALVE STENOSIS: ICD-10-CM

## 2023-02-08 DIAGNOSIS — I48.11 LONGSTANDING PERSISTENT ATRIAL FIBRILLATION: Primary | ICD-10-CM

## 2023-02-08 PROCEDURE — 93000 ELECTROCARDIOGRAM COMPLETE: CPT | Performed by: NURSE PRACTITIONER

## 2023-02-08 PROCEDURE — 99214 OFFICE O/P EST MOD 30 MIN: CPT | Performed by: NURSE PRACTITIONER

## 2023-02-08 NOTE — PROGRESS NOTES
"    CARDIOLOGY        Patient Name: Bhumi Gimenez  :1940  Age: 82 y.o.  Primary Cardiologist: Michael Subramanian MD  Encounter Provider:  ANTONY Queen    Date of Service: 23        CHIEF COMPLAINT / REASON FOR OFFICE VISIT     Atrial Fibrillation (6 month f/u)      HISTORY OF PRESENT ILLNESS       Atrial Fibrillation  Past medical history includes atrial fibrillation.     Bhumi Gimenez is a 82 y.o. female who presents today for 6-month reevaluation.    Pt has a  history significant for PAF, rheumatoid arthritis, hypertension, pancreatitis, irritable bowel syndrome, Takotsubo cardiomyopathy.    Patient reports that she has done well since last assessment.  Other than dyspnea when walking up a steep hill or steps that she is asymptomatic.  She denies chest discomfort, dyspnea at rest, orthopnea, palpitations, lightheadedness, edema, fatigue.  She does exercise in the form of walking several miles a day and she also plays golf routinely.  Patient reports that last month she had laboratory studies drawn at her rheumatology office and there was concern about her digoxin level.  Unfortunately, I do not have access to this test results.    The following portions of the patient's history were reviewed and updated as appropriate: allergies, current medications, past family history, past medical history, past social history, past surgical history and problem list.      VITAL SIGNS     Visit Vitals  /72 (BP Location: Left arm, Patient Position: Sitting, Cuff Size: Adult)   Pulse 61   Ht 161.3 cm (63.5\")   Wt 48.8 kg (107 lb 9.6 oz)   SpO2 100%   BMI 18.76 kg/m²         Wt Readings from Last 3 Encounters:   23 48.8 kg (107 lb 9.6 oz)   22 47.6 kg (105 lb)   22 49.4 kg (109 lb)     Body mass index is 18.76 kg/m².      REVIEW OF SYSTEMS   Review of Systems   Constitutional: Negative for chills, fever, weight gain and weight loss.   Cardiovascular: Negative for leg swelling. "   Respiratory: Negative for cough, snoring and wheezing.    Hematologic/Lymphatic: Negative for bleeding problem. Does not bruise/bleed easily.   Skin: Negative for color change.   Musculoskeletal: Negative for falls, joint pain and myalgias.   Gastrointestinal: Negative for melena.   Genitourinary: Negative for hematuria.   Neurological: Negative for excessive daytime sleepiness.   Psychiatric/Behavioral: Negative for depression. The patient is not nervous/anxious.            PHYSICAL EXAMINATION     Vitals and nursing note reviewed.   Constitutional:       Appearance: Normal appearance. Well-developed.   Eyes:      Conjunctiva/sclera: Conjunctivae normal.   Neck:      Vascular: No carotid bruit.   Pulmonary:      Breath sounds: Normal breath sounds.   Cardiovascular:      Irregularly irregular rhythm. Normal S1 with normal intensity. Normal S2 with normal intensity.      Murmurs: There is a grade 2/6 systolic murmur.      No gallop. No click. No rub.   Edema:     Peripheral edema absent.   Musculoskeletal: Normal range of motion. Skin:     General: Skin is warm and dry.   Neurological:      Mental Status: Alert and oriented to person, place, and time.      GCS: GCS eye subscore is 4. GCS verbal subscore is 5. GCS motor subscore is 6.   Psychiatric:         Speech: Speech normal.         Behavior: Behavior normal.         Thought Content: Thought content normal.         Judgment: Judgment normal.           REVIEWED DATA       ECG 12 Lead    Date/Time: 2/8/2023 12:46 PM  Performed by: Ewa Timmons APRN  Authorized by: Ewa Timmons APRN   Comparison: compared with previous ECG from 6/23/2021  Rhythm: atrial fibrillation  Rate: normal  BPM: 61  Conduction: conduction normal  ST Segments: ST segments normal  T Waves: T waves normal  QRS axis: normal    Clinical impression: abnormal EKG            Cardiac Procedures:  1. Echocardiogram 5/13/2021.  LVEF 56-60%.  All LV wall segments contract normally.  LAE.   Mild to moderate mitral valve regurgitation.  Moderate aortic valve stenosis.  Mild to moderate tricuspid valve regurgitation with estimated RVSP less than 35 mmHg.  2. Echocardiogram 6/22/2022.  LVEF 61-65%.  Sigmoid shaped ventricular septum present.  Normal RV cavity size and systolic function.  Left atrial cavity severely dilated.  Moderate aortic valve regurgitation.  No aortic valve stenosis.  Maximum pressure gradient 37.6 mmHg.  Mean pressure gradient 16 mmHg.  Mild to moderate mitral valve regurgitation.  Calculated RVSP 37 mmHg.          ASSESSMENT & PLAN     Diagnoses and all orders for this visit:    1. Longstanding persistent atrial fibrillation (HCC) (Primary)  · Patient in rate controlled atrial fibrillation in clinic today  · She mentions that she had laboratory studies drawn at a rheumatology appointment and there was concern about her digoxin level.  Unfortunately, I do not have access to these results at this time but will have my medical assistant reach out to Dr. Belinda Mcdonough's office to have them faxed.  If digoxin level is elevated then obviously this will need to be adjusted.  · Continue carvedilol 25 mg twice daily  · Anticoagulated with apixaban and denies bleeding complications    CHADS-VASc Risk Assessment            6 Total Score    1 Hypertension    2 Age >/= 75    2 PRIOR STROKE/TIA/THROMBO    1 Sex: Female        Criteria that do not apply:    CHF    DM    Vascular Disease    Age 65-74            2. Nonrheumatic aortic valve stenosis  · No stenosis noted on most recent echocardiogram but there is moderate regurgitation  · Currently asymptomatic    3. Takotsubo cardiomyopathy  · LVEF preserved on echocardiogram June 2022.  · Patient asymptomatic other than dyspnea when walking up inclines  · She exercises in the form of walking  · Continue carvedilol 25 mg twice daily        Return in about 6 months (around 8/8/2023) for Dr. Miguel-transitioning from Dr. Subramanian.    Future Appointments        Provider Department Center    8/9/2023 11:45 AM Arcadio Miguel MD [facility] CARDIOLOGY [place]        1558: Received laboratory studies dated 1/10/2023 [facility].  Digoxin level 1.1 with creatinine 0.84.  Discussed with Dr. Subramanian.  Patient has done very well on digoxin 250 mcg for several years.  We will repeat digoxin level in 3 months, if still above baseline consider alternating 125 mcg with 250 mcg.    MEDICATIONS         Discharge Medications          Accurate as of February 8, 2023  1:13 PM. If you have any questions, ask your nurse or doctor.            Continue These Medications      Instructions Start Date   acetaminophen 325 MG tablet  Commonly known as: TYLENOL   650 mg, Oral, Every 4 Hours PRN      apixaban 2.5 MG tablet tablet  Commonly known as: ELIQUIS   2.5 mg, Oral, Every 12 Hours Scheduled      aspirin 81 MG EC tablet   81 mg, Oral, Daily      carvedilol 25 MG tablet  Commonly known as: COREG   25 mg, Oral, 2 Times Daily With Meals      digoxin 250 MCG tablet  Commonly known as: LANOXIN   TAKE 1 TABLET BY MOUTH EVERY DAY      ferrous sulfate 325 (65 FE) MG EC tablet   325 mg, Oral, Daily With Breakfast      fluorometholone 0.1 % ophthalmic suspension  Commonly known as: FML   SHAKE GENTLY AND INSTILL ONE DROP IN RIGHT EYE BID.      furosemide 20 MG tablet  Commonly known as: Lasix   20 mg, Oral, Daily      loteprednol 0.5 % ophthalmic suspension  Commonly known as: LOTEMAX   1 drop, Both Eyes, 2 Times Daily      methotrexate 2.5 MG tablet   TAKE 8 TABLETS BY MOUTH EVERY WEEK      ofloxacin 0.3 % ophthalmic solution  Commonly known as: OCUFLOX   INSTILL 1 DROP INTO RIGHT EYE 3 TIMES A DAY FOR 1 WEEK      pantoprazole 40 MG EC tablet  Commonly known as: PROTONIX   40 mg, Oral, Daily      potassium chloride ER 20 MEQ tablet controlled-release ER tablet  Commonly known as: K-TAB   20 mEq, Oral, Daily                 **Dragon Disclaimer:   Much of this  encounter note is an electronic transcription/translation of spoken language to printed text. The electronic translation of spoken language may permit erroneous, or at times, nonsensical words or phrases to be inadvertently transcribed. Although I have reviewed the note for such errors, some may still exist.

## 2023-02-09 ENCOUNTER — TELEPHONE (OUTPATIENT)
Dept: CARDIOLOGY | Facility: CLINIC | Age: 83
End: 2023-02-09
Payer: MEDICARE

## 2023-02-09 NOTE — TELEPHONE ENCOUNTER
----- Message from ANTONY Queen sent at 2/8/2023  4:00 PM EST -----  Can you please let patient know that I did receive her digoxin level and it is just barely above baseline.  I did talk to Dr. Subramanian about this and she has done very well on her current dose of digoxin for several years and he was hesitant to change this.  We will repeat a digoxin level in 3 months and I have placed an order for her to get this drawn at lab.

## 2023-02-09 NOTE — TELEPHONE ENCOUNTER
I spoke with Bhumi Gimenez and updated pt on results/recommendations from provider.  Pt verbalized understanding and has no further questions at this time.    Thank you,    Kelsey Queen, RN  Triage [facility]  02/09/23 11:34 EST

## 2023-04-25 RX ORDER — DIGOXIN 250 MCG
TABLET ORAL
Qty: 90 TABLET | Refills: 2 | Status: SHIPPED | OUTPATIENT
Start: 2023-04-25

## 2023-08-10 NOTE — PROGRESS NOTES
RM:________     PCP: Niko Cheatham MD    : 1940  AGE: 83 y.o.  EST PATIENT   REASON FOR VISIT/  CC:    Wt Readings from Last 3 Encounters:   23 48.2 kg (106 lb 4.8 oz)   23 48.8 kg (107 lb 9.6 oz)   22 47.6 kg (105 lb)      BP Readings from Last 3 Encounters:   23 120/72   22 126/84   22 120/80      WT: ____________ BP: __________L __________R HR______    CHEST PAIN: _____________    SOA: _____________PALPS: _______________     LIGHTHEADED: ___________FATIGUE: ________________ EDEMA __________    ALLERGIES:Patient has no known allergies. SMOKING HISTORY:  Social History     Tobacco Use    Smoking status: Never     Passive exposure: Never    Smokeless tobacco: Never   Vaping Use    Vaping Use: Never used   Substance Use Topics    Alcohol use: No     Comment: caffiene: coffee pot daily    Drug use: No     CAFFEINE USE_________________  ALCOHOL ______________________    Below is the patient's most recent value for Albumin, ALT, AST, BUN, Calcium, Chloride, Cholesterol, CO2, Creatinine, GFR, Glucose, HDL, Hematocrit, Hemoglobin, Hemoglobin A1C, LDL, Magnesium, Phosphorus, Platelets, Potassium, PSA, Sodium, Triglycerides, TSH and WBC.   Lab Results   Component Value Date    ALBUMIN 3.90 2021    ALT 11 2021    AST 18 2021    BUN 10 2021    CALCIUM 8.6 2021     2021    CHOL 157 2021    CO2 26.7 2021    CREATININE 0.70 2022    HDL 75 (H) 2021    HCT 37.7 2021    HGB 12.6 2021    HGBA1C 5.60 2021    LDL 70 2021    MG 2.0 2021    PHOS 3.6 2021     2021    K 3.9 2021     2021    TRIG 56 2021    TSH 2.100 2021    WBC 6.35 2021          NEW DIAGNOSIS/ SURGERY/ HOSP OR ED VISITS: ______________________    __________________________________________________________________      RECENT LABS OR DIAGNOSTIC TESTING:   _____________________________    __________________________________________________________________      ASSESSMENT/ PLAN: _______________________________________________    __________________________________________________________________

## 2023-08-11 ENCOUNTER — OFFICE VISIT (OUTPATIENT)
Dept: CARDIOLOGY | Facility: CLINIC | Age: 83
End: 2023-08-11
Payer: MEDICARE

## 2023-08-11 VITALS
WEIGHT: 105.2 LBS | BODY MASS INDEX: 17.96 KG/M2 | SYSTOLIC BLOOD PRESSURE: 114 MMHG | HEIGHT: 64 IN | HEART RATE: 62 BPM | DIASTOLIC BLOOD PRESSURE: 70 MMHG

## 2023-08-11 DIAGNOSIS — S81.809D WOUNDS, MULTIPLE OPEN, LOWER EXTREMITY, UNSPECIFIED LATERALITY, SUBSEQUENT ENCOUNTER: ICD-10-CM

## 2023-08-11 DIAGNOSIS — I34.0 NONRHEUMATIC MITRAL VALVE REGURGITATION: ICD-10-CM

## 2023-08-11 DIAGNOSIS — I10 HYPERTENSION, ESSENTIAL: ICD-10-CM

## 2023-08-11 DIAGNOSIS — I35.0 NONRHEUMATIC AORTIC VALVE STENOSIS: ICD-10-CM

## 2023-08-11 DIAGNOSIS — R06.09 DYSPNEA ON EXERTION: Primary | ICD-10-CM

## 2023-08-11 DIAGNOSIS — Z79.899 LONG-TERM USE OF HIGH-RISK MEDICATION: ICD-10-CM

## 2023-08-11 DIAGNOSIS — I82.5Y3 CHRONIC DEEP VEIN THROMBOSIS (DVT) OF PROXIMAL VEIN OF BOTH LOWER EXTREMITIES: ICD-10-CM

## 2023-08-11 DIAGNOSIS — I48.21 PERMANENT ATRIAL FIBRILLATION: ICD-10-CM

## 2023-08-11 PROBLEM — R07.89 INTERMITTENT RIGHT-SIDED CHEST PAIN: Status: RESOLVED | Noted: 2021-05-12 | Resolved: 2023-08-11

## 2023-08-11 PROBLEM — K56.7 ILEUS, UNSPECIFIED: Status: RESOLVED | Noted: 2021-05-14 | Resolved: 2023-08-11

## 2023-08-11 NOTE — PROGRESS NOTES
Date of Office Visit: 23  Encounter Provider: Arcadio Miguel MD  Place of Service: [facility] CARDIOLOGY  Patient Name: Bhumi Gimenez  :1940    Chief Complaint   Patient presents with    Atrial Fibrillation   :     HPI:     Ms. Gimenez is 83 y.o. and presents today to establish care. I have reviewed prior notes and there are no changes except for any new updates described below. I have also reviewed any information entered into the medical record by the patient or by ancillary staff.     She has permanent atrial fibrillation; she has been treated with apixaban, carvedilol, and digoxin. She has a history of hypertension. She has rheumatoid arthritis. She had an echo in  that I interpreted myself; it showed normal LVSF, moderate basal septal hypertrophy without obstruction, moderate aortic valve thickening/mild AS/trivial AI, and mild-moderate MR. She had a normal perfusion stress test in .    She reports extremely easy bruising and bleeding, but no GI bleeding.  She slipped while getting out of the vehicle the other day and scraped the back of her legs and has open wounds that have been draining serous fluid.  She is asking for help with caring for these wounds.  She has chronic exertional dyspnea with inclines, but not on flat ground or at rest.  She denies PND, orthopnea, or leg swelling.  She denies chest pain or palpitations.  She denies syncope or near syncope.    Past Medical History:   Diagnosis Date    Anemia of chronic disorder     Biliary dyskinesia     Chronic deep venous thrombosis     chronic bilateral DVT    Chronic kidney disease     Chronic pancreatitis     ACUTE PANCREATITIS 2013    Diverticulosis     Duodenal diverticulum 2022    Added automatically from request for surgery [id number]    Esophageal reflux     Full dentures     Hepatomegaly     LIKELY SECONDARY TO ALCOHOL USE    History of alcohol use     quit     History of  transfusion     Hypertension     IBS (irritable bowel syndrome)     with diarrhea    Ileus, unspecified 05/14/2021    Left foot drop     LEFT FOOD DROP SECONDARY TO PERONEAL NERVE INJURY    Nonrheumatic aortic valve stenosis 08/11/2023    Osteoarthritis     OSTEO    Pancreatic pseudocyst resolved on CT 5/13/21 12/05/2016    Peripheral neuropathy     LEFT LOWER EXTREMITY    Permanent atrial fibrillation 08/11/2023    Personal history of gallstones     Personal history of malignant neoplasm of skin     S/P REMOVAL FROM LEFT KNEE    Primary osteoarthritis of both hands     Rheumatoid arthritis of multiple sites without organ or system involvement with positive rheumatoid factor 05/12/2021    Takotsubo syndrome     Likely secondary to acute illness in 11/13.  EF as low as 20-25% 11/27/13.  EF 63% by echo 4/14/14.    Thrombocytopenia        Past Surgical History:   Procedure Laterality Date    CHOLECYSTECTOMY      COLONOSCOPY N/A 9/8/2022    Procedure: COLONOSCOPY to cecum;  Surgeon: Jhonatan Casey Jr., MD;  Location: [facility] ENDOSCOPY;  Service: General;  Laterality: N/A;  pre: screening  post: normal    CORNEAL TRANSPLANT      ENDOSCOPY N/A 9/8/2022    Procedure: ESOPHAGOGASTRODUODENOSCOPY with cold biopsies;  Surgeon: Jhonatan Casey Jr., MD;  Location: [facility] ENDOSCOPY;  Service: General;  Laterality: N/A;  pre: RUQ abdominal pain  post: esophagitis and mild gastritis    MULTIPLE TOOTH EXTRACTIONS      FULL MOUTH TEETH REMOVED    TOTAL SHOULDER ARTHROPLASTY W/ DISTAL CLAVICLE EXCISION Right 11/16/2017    Procedure: Reverse Total Shoulder Arthroplasty;  Surgeon: Sabino Harley MD;  Location: [facility] MAIN OR;  Service:        Social History     Socioeconomic History    Marital status: Single   Tobacco Use    Smoking status: Never     Passive exposure: Never    Smokeless tobacco: Never   Vaping Use    Vaping Use: Never used   Substance and Sexual Activity    Alcohol use: No    Drug use: No    Sexual activity: Defer        Family History   Problem Relation Age of Onset    Alcohol abuse Mother     Other Mother         CORONARY ARTERIOSCLEROSIS    Cancer Father         prostate    Malig Hyperthermia Neg Hx        Review of Systems   All other systems reviewed and are negative.    No Known Allergies      Current Outpatient Medications:     acetaminophen (TYLENOL) 325 MG tablet, Take 2 tablets by mouth Every 4 (Four) Hours As Needed for Mild Pain ., Disp: , Rfl:     apixaban (ELIQUIS) 2.5 MG tablet tablet, Take 1 tablet by mouth Every 12 (Twelve) Hours. Indications: Atrial Fibrillation, Disp: 60 tablet, Rfl: 2    aspirin 81 MG EC tablet, Take 1 tablet by mouth Daily., Disp: , Rfl:     carvedilol (COREG) 25 MG tablet, Take 1 tablet by mouth 2 (Two) Times a Day With Meals., Disp: 60 tablet, Rfl: 2    digoxin (LANOXIN) 250 MCG tablet, TAKE 1 TABLET BY MOUTH EVERY DAY, Disp: 90 tablet, Rfl: 2    ferrous sulfate 325 (65 FE) MG EC tablet, Take 1 tablet by mouth Daily With Breakfast., Disp: , Rfl:     fluorometholone (FML) 0.1 % ophthalmic suspension, SHAKE GENTLY AND INSTILL ONE DROP IN RIGHT EYE BID., Disp: , Rfl: 3    folic acid (FOLVITE) 1 MG tablet, Take 1 tablet by mouth Daily., Disp: , Rfl:     furosemide (Lasix) 20 MG tablet, Take 1 tablet by mouth Daily., Disp: , Rfl:     loteprednol (LOTEMAX) 0.5 % ophthalmic suspension, Administer 1 drop to both eyes 2 (Two) Times a Day., Disp: , Rfl:     methotrexate 2.5 MG tablet, 7 TABLETS PER WEEK, Disp: , Rfl:     ofloxacin (OCUFLOX) 0.3 % ophthalmic solution, INSTILL 1 DROP INTO RIGHT EYE 3 TIMES A DAY FOR 1 WEEK, Disp: , Rfl: 0    pantoprazole (PROTONIX) 40 MG EC tablet, Take 1 tablet by mouth daily., Disp: 90 tablet, Rfl: 3    potassium chloride ER (K-TAB) 20 MEQ tablet controlled-release ER tablet, Take 1 tablet by mouth Daily., Disp: 90 tablet, Rfl: 1      Objective:     Vitals:    08/11/23 1135   BP: 114/70   BP Location: Left arm   Pulse: 62   Weight: 47.7 kg (105 lb 3.2 oz)  "  Height: 161.3 cm (63.5\")     Body mass index is 18.34 kg/mý.    Vitals reviewed.   Constitutional:       Appearance: Underweight.   Eyes:      Conjunctiva/sclera: Conjunctivae normal.   HENT:      Head: Normocephalic.      Nose: Nose normal.   Neck:      Thyroid: Thyroid normal.      Vascular: No JVD. JVD normal.      Lymphadenopathy: No cervical adenopathy.   Pulmonary:      Effort: Pulmonary effort is normal.      Breath sounds: Normal breath sounds.   Cardiovascular:      Normal rate. Irregularly irregular rhythm.      Murmurs: There is a grade 2/6 systolic murmur.   Pulses:     Intact distal pulses.   Edema:     Peripheral edema absent.   Abdominal:      Palpations: Abdomen is soft.      Tenderness: There is no abdominal tenderness.   Musculoskeletal: Normal range of motion.      Cervical back: Normal range of motion. Skin:     General: Skin is warm and dry.      Findings: No rash.   Neurological:      General: No focal deficit present.      Mental Status: Alert and oriented to person, place, and time.      Cranial Nerves: No cranial nerve deficit.   Psychiatric:         Behavior: Behavior normal.         Thought Content: Thought content normal.         Judgment: Judgment normal.         ECG 12 Lead    Date/Time: 8/11/2023 12:02 PM  Performed by: Arcadio Miguel MD  Authorized by: Arcadio Miguel MD   Comparison: compared with previous ECG   Similar to previous ECG  Rhythm: atrial fibrillation  Conduction: conduction normal  T Waves: T waves normal  QRS axis: normal  Other findings: non-specific ST-T wave changes    Clinical impression: abnormal EKG          Assessment:       Diagnosis Plan   1. Dyspnea on exertion  Adult Transthoracic Echo Complete W/ Cont if Necessary Per Protocol      2. Permanent atrial fibrillation  ECG 12 Lead    Adult Transthoracic Echo Complete W/ Cont if Necessary Per Protocol      3. Long-term use of high-risk medication        4. Chronic deep vein thrombosis (DVT) of proximal vein of " both lower extremities        5. Hypertension, essential        6. Nonrheumatic aortic valve stenosis  Adult Transthoracic Echo Complete W/ Cont if Necessary Per Protocol      7. Nonrheumatic mitral valve regurgitation        8. Wounds, multiple open, lower extremity, unspecified laterality, subsequent encounter  Ambulatory Referral to Wound Clinic             Plan:       She has chronic ROLLINS with inclines; this is stable and unchanged. She has no dyspnea at rest or on flat ground. She is euvolemic. I don't feel we have to do targeted testing just for this symptom, but I am getting an echo to evaluate her AS.    2/3.  Previous notes state that Dr. Cheatham has had some concern about her digoxin level in the past, but we have not had copies of these labs.  I am also concerned about such an underweight, older woman being on high-dose digoxin.  Additionally, she has diffuse ST scooping on her EKG.  I called Dr. Cheatham's office and they are going to draw a digoxin level when she comes to her appointment next week.  I will follow-up on that and I will surely be decreasing the dose.  As she is a very thin woman over the age of 79, our goal should probably be to stop it completely, if we can.    I see no indication for her to be on aspirin.  She is anticoagulated with apixaban.    4.  As above, she is on an appropriate dose of apixaban for DVT prophylaxis.     5.  Blood pressure is extremely well-controlled on her current regimen.    6/7.  In 2022, she had mild aortic stenosis and mild-moderate mitral regurgitation.  I will repeat an echocardiogram at this time.    8.  I have referred her to wound care for evaluation of these large superficial lesions with serous drainage.    Sincerely,       Arcadio Miguel MD

## 2023-08-14 ENCOUNTER — OFFICE VISIT (OUTPATIENT)
Dept: WOUND CARE | Facility: HOSPITAL | Age: 83
End: 2023-08-14
Payer: MEDICARE

## 2023-08-14 PROCEDURE — G0463 HOSPITAL OUTPT CLINIC VISIT: HCPCS

## 2023-08-14 PROCEDURE — 97602 WOUND(S) CARE NON-SELECTIVE: CPT

## 2023-08-22 NOTE — PROGRESS NOTES
I reviewed her labs.  She had a CMP drawn which was normal.  Her lipid panel showed an HDL of 73, LDL 66, triglycerides 92.  Her digoxin level is 1.2.  Her proBNP is 2000.    Triage, please call her and ask her to decrease digoxin to 1 tablet 3 days/week, usually this is on M/W/F.     We can then discuss at her upcoming visit in September if we need to further decrease it.

## 2023-08-23 ENCOUNTER — TELEPHONE (OUTPATIENT)
Dept: CARDIOLOGY | Facility: CLINIC | Age: 83
End: 2023-08-23
Payer: MEDICARE

## 2023-08-23 NOTE — TELEPHONE ENCOUNTER
I spoke with Bhumi Gimenez and updated pt on results/recommendations from provider.  Pt verbalized understanding and has no further questions at this time.    Thank you,    Kelsey Queen, RN  Triage [facility]  08/23/23 09:59 EDT

## 2023-08-28 ENCOUNTER — OFFICE VISIT (OUTPATIENT)
Dept: WOUND CARE | Facility: HOSPITAL | Age: 83
End: 2023-08-28
Payer: MEDICARE

## 2023-08-28 PROCEDURE — G0463 HOSPITAL OUTPT CLINIC VISIT: HCPCS

## 2023-09-11 ENCOUNTER — OFFICE VISIT (OUTPATIENT)
Dept: WOUND CARE | Facility: HOSPITAL | Age: 83
End: 2023-09-11
Payer: MEDICARE

## 2023-09-11 PROCEDURE — 97602 WOUND(S) CARE NON-SELECTIVE: CPT

## 2023-09-15 ENCOUNTER — TELEPHONE (OUTPATIENT)
Dept: CARDIOLOGY | Facility: CLINIC | Age: 83
End: 2023-09-15
Payer: MEDICARE

## 2023-09-15 NOTE — TELEPHONE ENCOUNTER
Patient apologizes for cancelling, but she was sick and running a fever. She is better now. She does want to reschedule.    Scheduling: can you get patient rescheduled for an appointment with TK and echo on the same day?    Stephanie Doran RN  Triage [facility]

## 2023-09-15 NOTE — PROGRESS NOTES
She was to follow-up with [facility] and have an echo 4 weeks after her visit.  The appointments were made but she canceled them.  Will you call to make sure she is doing okay and if she plans to reschedule?    Marcellus jackman

## 2023-09-29 ENCOUNTER — TELEPHONE (OUTPATIENT)
Dept: ORTHOPEDIC SURGERY | Facility: CLINIC | Age: 83
End: 2023-09-29

## 2023-09-29 NOTE — TELEPHONE ENCOUNTER
Caller: Bhumi Gimenez    Relationship to patient: Self    Best call back number: [phone]    Patient is needing: PATIENT RETUNING CALL TO SCHEDULE. UNABLE TO WT

## 2023-09-29 NOTE — TELEPHONE ENCOUNTER
Caller: Bhumi Gimenez    Relationship to patient: Self    Best call back number: [phone]    Chief complaint: LEFT SHOULDER     Type of visit: INJECTION     Requested date: ASAP   If rescheduling, when is the original appointment: N/A     Additional notes: PLEASE CALL FOR SCHEDULING. OKAY TO LEAVE A VOICEMAIL. LAST INJECTION 6-21-23

## 2023-10-02 ENCOUNTER — OFFICE VISIT (OUTPATIENT)
Dept: WOUND CARE | Facility: HOSPITAL | Age: 83
End: 2023-10-02
Payer: MEDICARE

## 2023-10-02 PROCEDURE — 17250 CHEM CAUT OF GRANLTJ TISSUE: CPT

## 2023-10-03 ENCOUNTER — TRANSCRIBE ORDERS (OUTPATIENT)
Dept: ADMINISTRATIVE | Facility: HOSPITAL | Age: 83
End: 2023-10-03
Payer: MEDICARE

## 2023-10-03 DIAGNOSIS — I70.242 ATHEROSCLEROSIS OF NATIVE ARTERY OF LEFT LOWER EXTREMITY WITH ULCERATION OF CALF: Primary | ICD-10-CM

## 2023-10-05 NOTE — PROGRESS NOTES
[facility]  Physical Therapy  Orthopedic / Sports / Industrial Physical Therapy  Physical Therapy Initial Evaluation and Plan of Care    Patient Name: Bhumi Gimenez          :  1940  Referring Physician: Sabino Harley MD  Diagnosis: Status post reverse total shoulder replacement, right [Z96.611]    Date of Evaluation: 2017  ______________________________________________________________________  TIME IN 1430 TIME OUT 1610    Subjective Evaluation    History of Present Illness  Onset date: Early summer 2017 and progressively got worse.  Date of surgery: 2017 - Right REV TSA.  Mechanism of injury: Insidious onset of (R) shoulder pain early in summer that progressively got worse - Received a cortizone injection with some improvement, but Pt noted severe increase in (R) shoulder pain on 3rd day of golfing while on vacation with family and she could not raise her arm -   MRI showed a RTC tear x 3 which could not be repaired per patient -       Patient Occupation: [employer] -  - Some lifting -    Precautions and Work Restrictions: Off work now - Quality of life: excellent    Pain  Current pain ratin  At worst pain rating: 3  Location: Diffuse ant/post/lat (R) shoulder into deltoid and upper arm - Pt denies NT-ing in RUE -  Quality: Ache.  Alleviating factors: Rest / non-use.  Exacerbated by: FE / Raising (R) arm - (R) Side-lying.  Progression: improved    Social Support  Lives in: condominium (Lives alone - )    Hand dominance: right    Diagnostic Tests  MRI studies: abnormal (RTC tear x 3 prior to surgery - per patient - )    Treatments  Previous treatment: injection treatment and medication  Current treatment: medication  Patient Goals  Patient/family treatment goals: Pain alleviation; Functional mobility, strength, function, and RTW w/o restrictions -          ___________________________________________________  Objective       Postural Observations  Standing posture:  Severe atrophy of (R) infraspinatus, supraspinatus fossas, deltoid and UT; (R) Scap ABDucted, winging inf/med angle, and inf angle rotated medially; Very prominent AC jt R>L -          Active Range of Motion   Left Shoulder   Normal active range of motion    Additional Active Range of Motion Details  AROM RIGHT SHOULDER - NA due to recent Rev TSA    Passive Range of Motion   Left Shoulder   Normal passive range of motion    Additional Passive Range of Motion Details  (R) shoulder FE: (Max 90-deg): 90-deg                      ER (In plane of scap 20-deg Max):  20-deg +                       NO IR or Shoulder EXTENSION!!  (R) Elbow/Forearm/Hand WNL -                   Strength/Myotome Testing     Left Shoulder   Normal muscle strength    Additional Strength Details  (R) Shoulder - Not forcefully assessed manually;  Grossly weak shoulder flexion, ext, abd, er/ir;        Elbow, Wrist grossly WFL    Tests     Additional Tests Details  (R) Shoulder: NA due to recent Reverse TSA (R) =      See Treatment Flow sheet for Exercises, Manual therapy, and modalities.   FUNCTIONAL ACTIVITIES: X 15 min  · TAPING / BRACING: NA  · Jt protection, ADL modification; Posture and ; Precautions; Protocol     ___________________________________________________  Assessment & Plan     Assessment  Assessment details:   S/P (R) Reverse TSA 11/16/2017    PROBLEMS: Pain; Limited mobility, strength, function, and unable to perform normal job -  PROGNOSIS: Good    GOALS:   SHORT TERM GOALS: 6 weeks:  1) HEP Initiated; 2) Pain decreased 50%:   3) AROM  increased:  4) Improved functional ability grossly;     LONG TERM GOALS: 12 weeks (or at time of DISCHARGE): 1) (I) HEP; 2) AROM WFL and pain free; 3) Strength / mobility to be able to perform all ADL's and job-related activities w/ minimal restrictions, etc.       Plan  Planned therapy interventions: ADL retraining, body mechanics training, flexibility, functional ROM exercises, home  exercise program, IADL retraining, manual therapy, neuromuscular re-education, postural training, soft tissue mobilization, strengthening, stretching and therapeutic activities (Modalities prn; Taping prn; )  Frequency: 3x week  Duration in weeks: 12  Treatment plan discussed with: patient    ___________________________________________________  Manual Therapy:    15     mins  38438;   Therapeutic Exercise:    25     mins  34541;     Neuromuscular Mekhi:        mins  12358;   Therapeutic Activity:     15     mins  01984;     Ultrasound:          mins  56858;    Electrical Stimulation:  ____         mins  15876 ( );  Dry Needling          mins self-pay   Gait Training:          mins  68604;  EVAL TIME:   25 mins    Timed Treatment:   55   mins                Total Treatment:     100   mins    PT SIGNATURE:   Evan Avery, PT  DATE TREATMENT INITIATED: 12/19/2017  ___________________________________________________  Initial Certification  Certification Period: 3/19/2018  I certify that the therapy services are furnished while this patient is under my care.  The services outlined above are required by this patient, and will be reviewed every 90 days.     PHYSICIAN: ________________________________  DATE: ______  Sabino Harley MD        Please sign and return via fax to [phone].. Thank you, [facility].  ______________________________________________________________________  [id number] [facility] [place] [address]  Phone: [phone] Fax: [phone]   n/a

## 2023-10-16 ENCOUNTER — OFFICE VISIT (OUTPATIENT)
Dept: WOUND CARE | Facility: HOSPITAL | Age: 83
End: 2023-10-16
Payer: MEDICARE

## 2023-10-16 PROCEDURE — 17250 CHEM CAUT OF GRANLTJ TISSUE: CPT

## 2023-10-19 ENCOUNTER — OFFICE VISIT (OUTPATIENT)
Dept: CARDIOLOGY | Facility: CLINIC | Age: 83
End: 2023-10-19
Payer: MEDICARE

## 2023-10-19 ENCOUNTER — HOSPITAL ENCOUNTER (OUTPATIENT)
Dept: CARDIOLOGY | Facility: HOSPITAL | Age: 83
Discharge: HOME | End: 2023-10-19
Admitting: INTERNAL MEDICINE
Payer: MEDICARE

## 2023-10-19 VITALS
WEIGHT: 108.8 LBS | HEIGHT: 64 IN | SYSTOLIC BLOOD PRESSURE: 128 MMHG | HEART RATE: 86 BPM | DIASTOLIC BLOOD PRESSURE: 70 MMHG | BODY MASS INDEX: 18.57 KG/M2

## 2023-10-19 DIAGNOSIS — I48.21 PERMANENT ATRIAL FIBRILLATION: Primary | ICD-10-CM

## 2023-10-19 DIAGNOSIS — I35.0 NONRHEUMATIC AORTIC VALVE STENOSIS: ICD-10-CM

## 2023-10-19 DIAGNOSIS — M89.8X1 SHOULDER BLADE PAIN: ICD-10-CM

## 2023-10-19 DIAGNOSIS — R06.09 DYSPNEA ON EXERTION: ICD-10-CM

## 2023-10-19 DIAGNOSIS — I36.1 NONRHEUMATIC TRICUSPID VALVE REGURGITATION: ICD-10-CM

## 2023-10-19 DIAGNOSIS — I48.21 PERMANENT ATRIAL FIBRILLATION: ICD-10-CM

## 2023-10-19 DIAGNOSIS — I34.0 NONRHEUMATIC MITRAL VALVE REGURGITATION: ICD-10-CM

## 2023-10-19 LAB
AORTIC ARCH: 2.3 CM
AORTIC DIMENSIONLESS INDEX: 0.3 (DI)
ASCENDING AORTA: 2.7 CM
BH CV ECHO MEAS - ACS: 1.04 CM
BH CV ECHO MEAS - AO MAX PG: 36 MMHG
BH CV ECHO MEAS - AO MEAN PG: 20.5 MMHG
BH CV ECHO MEAS - AO ROOT DIAM: 3.3 CM
BH CV ECHO MEAS - AO V2 MAX: 299.9 CM/SEC
BH CV ECHO MEAS - AO V2 VTI: 68.4 CM
BH CV ECHO MEAS - AVA(I,D): 0.93 CM2
BH CV ECHO MEAS - EDV(CUBED): 88.3 ML
BH CV ECHO MEAS - EDV(MOD-SP2): 67 ML
BH CV ECHO MEAS - EDV(MOD-SP4): 58 ML
BH CV ECHO MEAS - EF(MOD-BP): 57.5 %
BH CV ECHO MEAS - EF(MOD-SP2): 56.7 %
BH CV ECHO MEAS - EF(MOD-SP4): 56.9 %
BH CV ECHO MEAS - ESV(CUBED): 17.7 ML
BH CV ECHO MEAS - ESV(MOD-SP2): 29 ML
BH CV ECHO MEAS - ESV(MOD-SP4): 25 ML
BH CV ECHO MEAS - FS: 41.5 %
BH CV ECHO MEAS - IVS/LVPW: 1.01 CM
BH CV ECHO MEAS - IVSD: 0.82 CM
BH CV ECHO MEAS - LAT PEAK E' VEL: 11 CM/SEC
BH CV ECHO MEAS - LV DIASTOLIC VOL/BSA (35-75): 39.2 CM2
BH CV ECHO MEAS - LV MASS(C)D: 115 GRAMS
BH CV ECHO MEAS - LV MAX PG: 3.2 MMHG
BH CV ECHO MEAS - LV MEAN PG: 1.75 MMHG
BH CV ECHO MEAS - LV SYSTOLIC VOL/BSA (12-30): 16.9 CM2
BH CV ECHO MEAS - LV V1 MAX: 89.6 CM/SEC
BH CV ECHO MEAS - LV V1 VTI: 19.4 CM
BH CV ECHO MEAS - LVIDD: 4.5 CM
BH CV ECHO MEAS - LVIDS: 2.6 CM
BH CV ECHO MEAS - LVOT AREA: 3.3 CM2
BH CV ECHO MEAS - LVOT DIAM: 2.04 CM
BH CV ECHO MEAS - LVPWD: 0.81 CM
BH CV ECHO MEAS - MED PEAK E' VEL: 7.4 CM/SEC
BH CV ECHO MEAS - MR MAX PG: 132.5 MMHG
BH CV ECHO MEAS - MR MAX VEL: 575.5 CM/SEC
BH CV ECHO MEAS - MR MEAN PG: 95.4 MMHG
BH CV ECHO MEAS - MR MEAN VEL: 472.4 CM/SEC
BH CV ECHO MEAS - MR VTI: 196.3 CM
BH CV ECHO MEAS - MV DEC SLOPE: 471.2 CM/SEC2
BH CV ECHO MEAS - MV DEC TIME: 0.21 SEC
BH CV ECHO MEAS - MV E MAX VEL: 92 CM/SEC
BH CV ECHO MEAS - MV MAX PG: 4.6 MMHG
BH CV ECHO MEAS - MV MEAN PG: 2.08 MMHG
BH CV ECHO MEAS - MV P1/2T: 68.7 MSEC
BH CV ECHO MEAS - MV V2 VTI: 21 CM
BH CV ECHO MEAS - MVA(P1/2T): 3.2 CM2
BH CV ECHO MEAS - MVA(VTI): 3 CM2
BH CV ECHO MEAS - PA ACC TIME: 0.11 SEC
BH CV ECHO MEAS - PA V2 MAX: 72.9 CM/SEC
BH CV ECHO MEAS - RV MAX PG: 0.73 MMHG
BH CV ECHO MEAS - RV V1 MAX: 42.8 CM/SEC
BH CV ECHO MEAS - RV V1 VTI: 7.9 CM
BH CV ECHO MEAS - SI(MOD-SP2): 25.7 ML/M2
BH CV ECHO MEAS - SI(MOD-SP4): 22.3 ML/M2
BH CV ECHO MEAS - SUP REN AO DIAM: 2 CM
BH CV ECHO MEAS - SV(LVOT): 63.6 ML
BH CV ECHO MEAS - SV(MOD-SP2): 38 ML
BH CV ECHO MEAS - SV(MOD-SP4): 33 ML
BH CV ECHO MEAS - TAPSE (>1.6): 1.3 CM
BH CV ECHO MEAS - TR MAX PG: 21.3 MMHG
BH CV ECHO MEAS - TR MAX VEL: 230.8 CM/SEC
BH CV ECHO MEASUREMENTS AVERAGE E/E' RATIO: 10
BH CV XLRA - RV BASE: 3.2 CM
BH CV XLRA - RV LENGTH: 5.4 CM
BH CV XLRA - RV MID: 2.5 CM
BH CV XLRA - TDI S': 7.7 CM/SEC
LEFT ATRIUM VOLUME INDEX: 66.2 ML/M2
SINUS: 3.2 CM
STJ: 2.3 CM

## 2023-10-19 PROCEDURE — 1159F MED LIST DOCD IN RCRD: CPT | Performed by: NURSE PRACTITIONER

## 2023-10-19 PROCEDURE — 99214 OFFICE O/P EST MOD 30 MIN: CPT | Performed by: NURSE PRACTITIONER

## 2023-10-19 PROCEDURE — 93000 ELECTROCARDIOGRAM COMPLETE: CPT | Performed by: NURSE PRACTITIONER

## 2023-10-19 PROCEDURE — 3074F SYST BP LT 130 MM HG: CPT | Performed by: NURSE PRACTITIONER

## 2023-10-19 PROCEDURE — 1160F RVW MEDS BY RX/DR IN RCRD: CPT | Performed by: NURSE PRACTITIONER

## 2023-10-19 PROCEDURE — 93306 TTE W/DOPPLER COMPLETE: CPT

## 2023-10-19 PROCEDURE — 3078F DIAST BP <80 MM HG: CPT | Performed by: NURSE PRACTITIONER

## 2023-10-19 NOTE — PROGRESS NOTES
Date of Office Visit: 10/19/2023  Encounter Provider: ANTONY Onofre  Place of Service: [facility] CARDIOLOGY  Patient Name: Bhumi Gimenez  :1940  Primary Cardiologist: Dr. Arcadio Miguel    Chief Complaint   Patient presents with    Follow-up    Atrial Fibrillation   :     HPI: Bhumi Gimenez is a 83 y.o. female who presents today for follow-up visit.  She is a new patient to me and I reviewed her medical records.    She has been diagnosed with hypertension and rheumatoid arthritis.  She has a history of chronic DVT of the proximal vein of both lower extremities.    She has permanent atrial fibrillation treated with apixaban, carvedilol, and digoxin.  In , myocardial perfusion study was normal.    In , Dr. Miguel's echocardiogram interpretation was normal EF, moderate basal septal hypertrophy without obstruction, moderate aortic valve thickening, mild aortic stenosis, trivial aortic regurgitation, and mild to moderate mitral regurgitation.    In 2023, she established care with Dr. Arcadio Miguel (previously Dr. Michael Subramanian's patient).  She reported chronic exertional dyspnea with inclines.  Her digoxin level is 1.2 and her digoxin was decreased to 1 tablet on Monday, Wednesday, and . Aspirin was discontinued.  An echocardiogram was ordered.    Echocardiogram was just completed and showed the following: Normal EF, left atrium severely dilated, aortic valve calcification/thickening, moderate aortic stenosis, mild to moderate mitral valve regurgitation, nodular mitral valve calcification, and mild to moderate tricuspid regurgitation.  The results were discussed with the patient.    She reports dyspnea with inclines. Sometimes after playing golf she will experience a mild shoulder blade pain/fatigue. She denies any exertional discomfort or chest pain.  She denies palpitations, edema, dizziness, or syncope.  She is taking the digoxin on Monday,  Wednesday, and Fridays.  She denies palpitations.  She said since she stopped the aspirin she is not bruising as badly.      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    Added automatically from request for surgery [id number]    Esophageal reflux     Full dentures     Hepatomegaly     LIKELY SECONDARY TO ALCOHOL USE    History of alcohol use     quit 11/13    History of transfusion     Hypertension     IBS (irritable bowel syndrome)     with diarrhea    Ileus, unspecified 05/14/2021    Left foot drop     LEFT FOOD DROP SECONDARY TO PERONEAL NERVE INJURY    Nonrheumatic aortic valve stenosis 08/11/2023    Osteoarthritis     OSTEO    Pancreatic pseudocyst resolved on CT 5/13/21 12/05/2016    Peripheral neuropathy     LEFT LOWER EXTREMITY    Permanent atrial fibrillation 08/11/2023    Personal history of gallstones     Personal history of malignant neoplasm of skin     S/P REMOVAL FROM LEFT KNEE    Primary osteoarthritis of both hands     Rheumatoid arthritis of multiple sites without organ or system involvement with positive rheumatoid factor 05/12/2021    Takotsubo syndrome     Likely secondary to acute illness in 11/13.  EF as low as 20-25% 11/27/13.  EF 63% by echo 4/14/14.    Thrombocytopenia        Past Surgical History:   Procedure Laterality Date    CHOLECYSTECTOMY      COLONOSCOPY N/A 9/8/2022    Procedure: COLONOSCOPY to cecum;  Surgeon: Jhonatan Casey Jr., MD;  Location: [facility] ENDOSCOPY;  Service: General;  Laterality: N/A;  pre: screening  post: normal    CORNEAL TRANSPLANT      ENDOSCOPY N/A 9/8/2022    Procedure: ESOPHAGOGASTRODUODENOSCOPY with cold biopsies;  Surgeon: Jhonatan Casey Jr., MD;  Location: [facility] ENDOSCOPY;  Service: General;  Laterality: N/A;   pre: RUQ abdominal pain  post: esophagitis and mild gastritis    MULTIPLE TOOTH EXTRACTIONS      FULL MOUTH TEETH REMOVED    TOTAL SHOULDER ARTHROPLASTY W/ DISTAL CLAVICLE EXCISION Right 11/16/2017    Procedure: Reverse Total Shoulder Arthroplasty;  Surgeon: Sabino Harley MD;  Location: [facility];  Service:        Social History     Socioeconomic History    Marital status: Single   Tobacco Use    Smoking status: Never     Passive exposure: Never    Smokeless tobacco: Never   Vaping Use    Vaping Use: Never used   Substance and Sexual Activity    Alcohol use: No    Drug use: No    Sexual activity: Defer       Family History   Problem Relation Age of Onset    Alcohol abuse Mother     Other Mother         CORONARY ARTERIOSCLEROSIS    Cancer Father         prostate    Malig Hyperthermia Neg Hx        The following portion of the patient's history were reviewed and updated as appropriate: past medical history, past surgical history, past social history, past family history, allergies, current medications, and problem list.    Review of Systems   Constitutional: Negative.   Cardiovascular:  Positive for dyspnea on exertion (inclines). Negative for palpitations.   Respiratory: Negative.     Hematologic/Lymphatic: Negative.    Neurological: Negative.        No Known Allergies      Current Outpatient Medications:     acetaminophen (TYLENOL) 325 MG tablet, Take 2 tablets by mouth Every 4 (Four) Hours As Needed for Mild Pain ., Disp: , Rfl:     apixaban (ELIQUIS) 2.5 MG tablet tablet, Take 1 tablet by mouth Every 12 (Twelve) Hours. Indications: Atrial Fibrillation, Disp: 60 tablet, Rfl: 2    carvedilol (COREG) 25 MG tablet, Take 1 tablet by mouth 2 (Two) Times a Day With Meals., Disp: 60 tablet, Rfl: 2    digoxin (LANOXIN) 250 MCG tablet, TAKE 1 TABLET BY MOUTH EVERY DAY (Patient taking differently: ONE TABLET, MONDAY, WEDNESDAY AND FRIDAY), Disp: 90 tablet, Rfl: 2    ferrous sulfate 325 (65 FE) MG EC tablet, Take 1  "tablet by mouth Daily With Breakfast., Disp: , Rfl:     fluorometholone (FML) 0.1 % ophthalmic suspension, SHAKE GENTLY AND INSTILL ONE DROP IN RIGHT EYE BID., Disp: , Rfl: 3    folic acid (FOLVITE) 1 MG tablet, Take 1 tablet by mouth Daily., Disp: , Rfl:     furosemide (Lasix) 20 MG tablet, Take 1 tablet by mouth Daily., Disp: , Rfl:     loteprednol (LOTEMAX) 0.5 % ophthalmic suspension, Administer 1 drop to both eyes 2 (Two) Times a Day., Disp: , Rfl:     methotrexate 2.5 MG tablet, 7 TABLETS PER WEEK, Disp: , Rfl:     ofloxacin (OCUFLOX) 0.3 % ophthalmic solution, INSTILL 1 DROP INTO RIGHT EYE 3 TIMES A DAY FOR 1 WEEK, Disp: , Rfl: 0    pantoprazole (PROTONIX) 40 MG EC tablet, Take 1 tablet by mouth daily., Disp: 90 tablet, Rfl: 3    potassium chloride ER (K-TAB) 20 MEQ tablet controlled-release ER tablet, Take 1 tablet by mouth Daily., Disp: 90 tablet, Rfl: 1         Objective:     Vitals:    10/19/23 1449   BP: 128/70   BP Location: Left arm   Patient Position: Sitting   Cuff Size: Adult   Pulse: 86   Weight: 49.4 kg (108 lb 12.8 oz)   Height: 161.3 cm (63.5\")     Body mass index is 18.97 kg/m².    PHYSICAL EXAM:    Vitals Reviewed.   General Appearance: In no acute distress.  Thin.  Eyes: Glasses.   HENT: No hearing loss noted.    Respiratory: No signs of respiratory distress. Respiration rhythm and depth normal.  Clear to auscultation.   Cardiovascular:  Jugular Venous Pressure: Normal  Heart Rate and Rhythm: Irregular, irregular.  Heart Sounds: Normal S1 and S2. No S3 or S4 noted.  Murmurs: LUSB grade 2/6 systolic murmur present.  Lower Extremities: Bilateral trace lower extremity edema.  Dressing on left leg.  Musculoskeletal: Normal movement of extremities.  Skin: General appearance normal.  Ro.  Psychiatric: Patient alert and oriented to person, place, and time. Speech and behavior appropriate. Normal mood and affect.       ECG 12 Lead    Date/Time: 10/19/2023 2:42 PM  Performed by: Maira Jay " ANTONY    Authorized by: Maira Jay APRN  Comparison: compared with previous ECG from 8/11/2023  Similar to previous ECG  Rhythm: atrial fibrillation  Rate: normal  BPM: 86  Conduction: conduction normal  T Waves: T waves normal  QRS axis: normal  Other findings: non-specific ST-T wave changes    Clinical impression: abnormal EKG            Assessment:       Diagnosis Plan   1. Permanent atrial fibrillation  ECG 12 Lead      2. Nonrheumatic aortic valve stenosis        3. Nonrheumatic mitral valve regurgitation        4. Nonrheumatic tricuspid valve regurgitation        5. Dyspnea on exertion        6. Shoulder blade pain               Plan:       1.  Permanent Atrial Fibrillation: Rate controlled with carvedilol and digoxin.  She is now taking digoxin 1 tablet on Monday, Wednesday, and Fridays.  Repeat dig level.    2/3/4.  Today's echocardiogram showed moderate aortic stenosis, mild to moderate mitral regurgitation, and mild to moderate tricuspid regurgitation.  I will ask Dr. Miguel to review.    5.  She experiences dyspnea with inclines.  Sometimes when playing golf, she will experience a mild left shoulder blade pain/fatigue feeling.  I will discuss with Dr. Miguel if she thinks a stress test is warranted.    6.  Further recommendations to follow.    ADDENDUM 10/23/2023:  Continue digoxin 3 times per week.  Repeat digoxin level at her PCP office per her request.  With her moderate aortic stenosis-repeat echocardiogram in 1 year.  Dr. Miguel recommended a Lexiscan Myoview stress test to be completed.  Patient informed & verbalizes understanding.     As always, it has been a pleasure to participate in your patient's care. Thank you.         Sincerely,         ANTONY Mclaughlin  [facility] Cardiology      Dictated utilizing Dragon Dictation

## 2023-10-19 NOTE — PROGRESS NOTES
I discussed results with patient during office visit today.  Her aortic stenosis has progressed from mild to moderate.  She reports some mild dyspnea with inclines only.  She is having some shoulder blade discomfort.  I plan to discuss with Dr. Miguel upon her return.

## 2023-10-23 ENCOUNTER — CLINICAL SUPPORT (OUTPATIENT)
Dept: ORTHOPEDIC SURGERY | Facility: CLINIC | Age: 83
End: 2023-10-23
Payer: MEDICARE

## 2023-10-23 ENCOUNTER — TELEPHONE (OUTPATIENT)
Dept: CARDIOLOGY | Facility: CLINIC | Age: 83
End: 2023-10-23
Payer: MEDICARE

## 2023-10-23 VITALS — TEMPERATURE: 98.5 F | HEIGHT: 64 IN | BODY MASS INDEX: 18.44 KG/M2 | WEIGHT: 108 LBS

## 2023-10-23 DIAGNOSIS — I48.21 PERMANENT ATRIAL FIBRILLATION: Primary | ICD-10-CM

## 2023-10-23 DIAGNOSIS — M25.512 CHRONIC LEFT SHOULDER PAIN: Primary | ICD-10-CM

## 2023-10-23 DIAGNOSIS — G89.29 CHRONIC LEFT SHOULDER PAIN: Primary | ICD-10-CM

## 2023-10-23 PROCEDURE — 20610 DRAIN/INJ JOINT/BURSA W/O US: CPT | Performed by: ORTHOPAEDIC SURGERY

## 2023-10-23 RX ORDER — METHYLPREDNISOLONE ACETATE 80 MG/ML
80 INJECTION, SUSPENSION INTRA-ARTICULAR; INTRALESIONAL; INTRAMUSCULAR; SOFT TISSUE
Status: COMPLETED | OUTPATIENT
Start: 2023-10-23 | End: 2023-10-23

## 2023-10-23 RX ORDER — LIDOCAINE HYDROCHLORIDE 10 MG/ML
2 INJECTION, SOLUTION EPIDURAL; INFILTRATION; INTRACAUDAL; PERINEURAL
Status: COMPLETED | OUTPATIENT
Start: 2023-10-23 | End: 2023-10-23

## 2023-10-23 RX ADMIN — METHYLPREDNISOLONE ACETATE 80 MG: 80 INJECTION, SUSPENSION INTRA-ARTICULAR; INTRALESIONAL; INTRAMUSCULAR; SOFT TISSUE at 14:14

## 2023-10-23 RX ADMIN — LIDOCAINE HYDROCHLORIDE 2 ML: 10 INJECTION, SOLUTION EPIDURAL; INFILTRATION; INTRACAUDAL; PERINEURAL at 14:14

## 2023-10-23 NOTE — TELEPHONE ENCOUNTER
I discussed plan of care with Dr. Miguel     Continue digoxin 3 times per week.    Repeat digoxin level at her PCP office per her request.  With her moderate aortic stenosis-repeat echocardiogram in 1 year.  Dr. Miguel recommended a Lexiscan Myoview stress test to be completed.    Patient informed & verbalizes understanding.

## 2023-10-23 NOTE — PROGRESS NOTES
Your office note from 10/19 is not complete. Will you complete it and then we can discuss plan? I'm okay with dig 3x/week for now. A stress would be reasonable. Mod AS needs follow up with echo in one year.    augustina

## 2023-10-23 NOTE — TELEPHONE ENCOUNTER
Please call patient.  Please schedule Lexiscan Myoview stress test to be completed in the near future.  Please remind her to have a digoxin level completed at the main lab before or after the test.    Please also schedule a 1 year follow-up visit with Dr. Miguel and same-day echocardiogram.  Thank you

## 2023-10-30 ENCOUNTER — HOSPITAL ENCOUNTER (OUTPATIENT)
Dept: CARDIOLOGY | Facility: HOSPITAL | Age: 83
Discharge: HOME | End: 2023-10-30
Admitting: EMERGENCY MEDICINE
Payer: MEDICARE

## 2023-10-30 DIAGNOSIS — I70.242 ATHEROSCLEROSIS OF NATIVE ARTERY OF LEFT LOWER EXTREMITY WITH ULCERATION OF CALF: ICD-10-CM

## 2023-10-30 LAB
BH CV LOWER ARTERIAL LEFT ABI RATIO: 1.13
BH CV LOWER ARTERIAL LEFT DORSALIS PEDIS SYS MAX: 139
BH CV LOWER ARTERIAL LEFT GREAT TOE SYS MAX: 87
BH CV LOWER ARTERIAL LEFT POST TIBIAL SYS MAX: 112
BH CV LOWER ARTERIAL LEFT TBI RATIO: 0.71
BH CV LOWER ARTERIAL RIGHT ABI RATIO: 1.23
BH CV LOWER ARTERIAL RIGHT DORSALIS PEDIS SYS MAX: 151
BH CV LOWER ARTERIAL RIGHT GREAT TOE SYS MAX: 105
BH CV LOWER ARTERIAL RIGHT POST TIBIAL SYS MAX: 133
BH CV LOWER ARTERIAL RIGHT TBI RATIO: 0.85
UPPER ARTERIAL LEFT ARM BRACHIAL SYS MAX: 123
UPPER ARTERIAL RIGHT ARM BRACHIAL SYS MAX: 123

## 2023-10-30 PROCEDURE — 93922 UPR/L XTREMITY ART 2 LEVELS: CPT

## 2023-11-01 ENCOUNTER — LAB (OUTPATIENT)
Dept: LAB | Facility: HOSPITAL | Age: 83
End: 2023-11-01
Payer: MEDICARE

## 2023-11-01 ENCOUNTER — HOSPITAL ENCOUNTER (OUTPATIENT)
Dept: CARDIOLOGY | Facility: HOSPITAL | Age: 83
Discharge: HOME | End: 2023-11-01
Payer: MEDICARE

## 2023-11-01 DIAGNOSIS — I48.21 PERMANENT ATRIAL FIBRILLATION: ICD-10-CM

## 2023-11-01 DIAGNOSIS — M89.8X1 SHOULDER BLADE PAIN: ICD-10-CM

## 2023-11-01 DIAGNOSIS — R06.09 DYSPNEA ON EXERTION: ICD-10-CM

## 2023-11-01 LAB — DIGOXIN SERPL-MCNC: 1.2 NG/ML (ref 0.6–1.2)

## 2023-11-01 PROCEDURE — 80162 ASSAY OF DIGOXIN TOTAL: CPT

## 2023-11-01 PROCEDURE — 36415 COLL VENOUS BLD VENIPUNCTURE: CPT

## 2023-11-02 ENCOUNTER — TELEPHONE (OUTPATIENT)
Dept: CARDIOLOGY | Facility: CLINIC | Age: 83
End: 2023-11-02
Payer: MEDICARE

## 2023-11-02 RX ORDER — DIGOXIN 125 MCG
TABLET ORAL
Qty: 20 TABLET | Refills: 1 | Status: SHIPPED | OUTPATIENT
Start: 2023-11-02

## 2023-11-02 NOTE — PROGRESS NOTES
She has permanent atrial fibrillation that is rate controlled and she is asymptomatic.  She is taking digoxin 250 mcg 1 tablet on Monday, Wednesday, and Fridays.  You recommended a repeat digoxin level and its 1.2.      Are you okay for her to continue with the digoxin?

## 2023-11-02 NOTE — TELEPHONE ENCOUNTER
I spoke with Ms. Gimenez. I spoke with her about the digoxin level results.  Dr. Miguel recommended reducing the digoxin to 125 mcg Monday, Wednesday, and Friday.  She verbalized understanding.  She like to come to the office in a month for a follow-up with the medication change.

## 2023-11-02 NOTE — PROGRESS NOTES
I spoke with Ms. Gimenez.  I spoke with her about the digoxin level results.  Dr. Miguel recommended reducing the digoxin to 125 mcg Monday, Wednesday, and Friday.  He verbalized understanding.  She like to come to the office in a month for a follow-up with the medication change.

## 2023-11-06 ENCOUNTER — OFFICE VISIT (OUTPATIENT)
Dept: WOUND CARE | Facility: HOSPITAL | Age: 83
End: 2023-11-06
Payer: MEDICARE

## 2023-11-07 ENCOUNTER — HOSPITAL ENCOUNTER (OUTPATIENT)
Dept: CARDIOLOGY | Facility: HOSPITAL | Age: 83
Discharge: HOME | End: 2023-11-07
Admitting: NURSE PRACTITIONER
Payer: MEDICARE

## 2023-11-07 VITALS — HEIGHT: 64 IN | BODY MASS INDEX: 17.31 KG/M2 | WEIGHT: 101.41 LBS

## 2023-11-07 LAB
BH CV NUCLEAR PRIOR STUDY: 1
BH CV REST NUCLEAR ISOTOPE DOSE: 6.9 MCI
BH CV STRESS BP STAGE 1: NORMAL
BH CV STRESS COMMENTS STAGE 1: NORMAL
BH CV STRESS DOSE REGADENOSON STAGE 1: 0.4
BH CV STRESS DURATION MIN STAGE 1: 0
BH CV STRESS DURATION SEC STAGE 1: 10
BH CV STRESS HR STAGE 1: 124
BH CV STRESS NUCLEAR ISOTOPE DOSE: 22.7 MCI
BH CV STRESS PROTOCOL 1: NORMAL
BH CV STRESS RECOVERY BP: NORMAL MMHG
BH CV STRESS RECOVERY HR: 96 BPM
BH CV STRESS STAGE 1: 1
LV EF NUC BP: 71 %
MAXIMAL PREDICTED HEART RATE: 136 BPM
PERCENT MAX PREDICTED HR: 91.18 %
STRESS BASELINE BP: NORMAL MMHG
STRESS BASELINE HR: 86 BPM
STRESS PERCENT HR: 107 %
STRESS POST EXERCISE DUR SEC: 10 SEC
STRESS POST PEAK BP: NORMAL MMHG
STRESS POST PEAK HR: 124 BPM
STRESS TARGET HR: 116 BPM

## 2023-11-07 PROCEDURE — 25010000002 REGADENOSON 0.4 MG/5ML SOLUTION: Performed by: NURSE PRACTITIONER

## 2023-11-07 PROCEDURE — 78452 HT MUSCLE IMAGE SPECT MULT: CPT

## 2023-11-07 PROCEDURE — 93016 CV STRESS TEST SUPVJ ONLY: CPT | Performed by: INTERNAL MEDICINE

## 2023-11-07 PROCEDURE — 93018 CV STRESS TEST I&R ONLY: CPT | Performed by: INTERNAL MEDICINE

## 2023-11-07 PROCEDURE — 93017 CV STRESS TEST TRACING ONLY: CPT

## 2023-11-07 PROCEDURE — 78452 HT MUSCLE IMAGE SPECT MULT: CPT | Performed by: INTERNAL MEDICINE

## 2023-11-07 PROCEDURE — A9502 TC99M TETROFOSMIN: HCPCS | Performed by: NURSE PRACTITIONER

## 2023-11-07 PROCEDURE — 0 TECHNETIUM TETROFOSMIN KIT: Performed by: NURSE PRACTITIONER

## 2023-11-07 RX ORDER — REGADENOSON 0.08 MG/ML
0.4 INJECTION, SOLUTION INTRAVENOUS
Status: COMPLETED | OUTPATIENT
Start: 2023-11-07 | End: 2023-11-07

## 2023-11-07 RX ADMIN — TETROFOSMIN 1 DOSE: 1.38 INJECTION, POWDER, LYOPHILIZED, FOR SOLUTION INTRAVENOUS at 08:20

## 2023-11-07 RX ADMIN — TETROFOSMIN 1 DOSE: 1.38 INJECTION, POWDER, LYOPHILIZED, FOR SOLUTION INTRAVENOUS at 09:13

## 2023-11-07 RX ADMIN — REGADENOSON 0.4 MG: 0.08 INJECTION, SOLUTION INTRAVENOUS at 09:13

## 2023-11-08 ENCOUNTER — TELEPHONE (OUTPATIENT)
Dept: CARDIOLOGY | Facility: CLINIC | Age: 83
End: 2023-11-08
Payer: MEDICARE

## 2023-11-08 NOTE — TELEPHONE ENCOUNTER
I recently saw her in the office and she reported dyspnea with inclines. Sometimes she would experience shoulder blade pain and fatigue within golf.       Stress test showed no evidence of ischemia.  She reported dyspnea during the stress test.  I recommended that we just continue to monitor.    I tried calling; no answer.  I left a message saying that I would call her back with test results.

## 2023-11-14 NOTE — TELEPHONE ENCOUNTER
I spoke with her via phone about results. She notices dyspnea with inclines; otherwise she feels good. She walks on a flat surface she has no problem and walks 4 miles daily. She will fu with me in December.     Next steps.. afib causing this? Cath? Further recommendations to follow.

## 2023-12-05 ENCOUNTER — OFFICE VISIT (OUTPATIENT)
Dept: CARDIOLOGY | Facility: CLINIC | Age: 83
End: 2023-12-05
Payer: MEDICARE

## 2023-12-05 VITALS
SYSTOLIC BLOOD PRESSURE: 122 MMHG | WEIGHT: 109 LBS | DIASTOLIC BLOOD PRESSURE: 60 MMHG | HEIGHT: 64 IN | HEART RATE: 95 BPM | BODY MASS INDEX: 18.61 KG/M2

## 2023-12-05 DIAGNOSIS — R06.09 DYSPNEA ON EXERTION: Primary | ICD-10-CM

## 2023-12-05 DIAGNOSIS — I36.1 NONRHEUMATIC TRICUSPID VALVE REGURGITATION: ICD-10-CM

## 2023-12-05 DIAGNOSIS — I34.0 NONRHEUMATIC MITRAL VALVE REGURGITATION: ICD-10-CM

## 2023-12-05 DIAGNOSIS — I35.0 NONRHEUMATIC AORTIC VALVE STENOSIS: ICD-10-CM

## 2023-12-05 DIAGNOSIS — M05.79 RHEUMATOID ARTHRITIS OF MULTIPLE SITES WITHOUT ORGAN OR SYSTEM INVOLVEMENT WITH POSITIVE RHEUMATOID FACTOR: ICD-10-CM

## 2023-12-05 DIAGNOSIS — M89.8X1 SHOULDER BLADE PAIN: ICD-10-CM

## 2023-12-05 DIAGNOSIS — I48.21 PERMANENT ATRIAL FIBRILLATION: ICD-10-CM

## 2023-12-05 PROCEDURE — 1159F MED LIST DOCD IN RCRD: CPT | Performed by: NURSE PRACTITIONER

## 2023-12-05 PROCEDURE — 1160F RVW MEDS BY RX/DR IN RCRD: CPT | Performed by: NURSE PRACTITIONER

## 2023-12-05 PROCEDURE — 99214 OFFICE O/P EST MOD 30 MIN: CPT | Performed by: NURSE PRACTITIONER

## 2023-12-05 RX ORDER — POTASSIUM CHLORIDE 20 MEQ/1
TABLET, EXTENDED RELEASE ORAL
COMMUNITY
Start: 2023-11-17 | End: 2023-12-06

## 2023-12-05 NOTE — H&P (VIEW-ONLY)
Date of Office Visit: 2023  Encounter Provider: ANTONY Onofre  Place of Service: [facility] CARDIOLOGY  Patient Name: Bhumi Gimenez  :1940  Primary Cardiologist: Dr. Arcadio Miguel    Chief Complaint   Patient presents with    Follow-up    Shortness of Breath   :     HPI: Bhumi Gimenez is a 83 y.o. female who presents today for follow-up visit. I have reviewed her medical records.    She has been diagnosed with hypertension and rheumatoid arthritis.  She has a history of chronic DVT of the proximal vein of both lower extremities.    She has permanent atrial fibrillation treated with apixaban, carvedilol, and digoxin.  In , myocardial perfusion study was normal.    In 2023, she established care with Dr. Arcadio Miguel (previously Dr. Michael Subramanian's patient).  She reported chronic exertional dyspnea with inclines.  Her digoxin level is 1.2 and her digoxin was decreased to 1 tablet on Monday, Wednesday, and . Aspirin was discontinued.  Echocardiogram showed normal EF, left atrium severely dilated, aortic valve calcification/thickening, moderate aortic stenosis, mild to moderate mitral valve regurgitation, nodular mitral valve calcification, and mild to moderate tricuspid regurgitation.      In 2023, she followed up with me. She reported still having dyspnea and bilateral shoulder blade pain with inclines that resolves with rest.  In 2023, myocardial perfusion study was normal.    She presents today for follow-up visit. We reviewed the stress test results.  She has not been playing golf and avoid status, so she has not had any exertional symptoms.  She walks 4 miles daily on flat surface without any shortness of breath, chest pain, or back pain.  She reports that her younger brother had a heart attack and both her older brother and sister have had stents placed.  She denies chest pain, palpitations, edema, dizziness, syncope, or  bleeding.      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    Added automatically from request for surgery [id number]    Esophageal reflux     Full dentures     Hepatomegaly     LIKELY SECONDARY TO ALCOHOL USE    History of alcohol use     quit 11/13    History of transfusion     Hypertension     IBS (irritable bowel syndrome)     with diarrhea    Ileus, unspecified 05/14/2021    Left foot drop     LEFT FOOD DROP SECONDARY TO PERONEAL NERVE INJURY    Nonrheumatic aortic valve stenosis 08/11/2023    Osteoarthritis     OSTEO    Pancreatic pseudocyst resolved on CT 5/13/21 12/05/2016    Peripheral neuropathy     LEFT LOWER EXTREMITY    Permanent atrial fibrillation 08/11/2023    Personal history of gallstones     Personal history of malignant neoplasm of skin     S/P REMOVAL FROM LEFT KNEE    Primary osteoarthritis of both hands     Rheumatoid arthritis of multiple sites without organ or system involvement with positive rheumatoid factor 05/12/2021    Takotsubo syndrome     Likely secondary to acute illness in 11/13.  EF as low as 20-25% 11/27/13.  EF 63% by echo 4/14/14.    Thrombocytopenia        Past Surgical History:   Procedure Laterality Date    CHOLECYSTECTOMY      COLONOSCOPY N/A 9/8/2022    Procedure: COLONOSCOPY to cecum;  Surgeon: Jhonatan Casey Jr., MD;  Location: [facility] ENDOSCOPY;  Service: General;  Laterality: N/A;  pre: screening  post: normal    CORNEAL TRANSPLANT      ENDOSCOPY N/A 9/8/2022    Procedure: ESOPHAGOGASTRODUODENOSCOPY with cold biopsies;  Surgeon: Jhonatan Casey Jr., MD;  Location: [facility] ENDOSCOPY;  Service: General;  Laterality: N/A;  pre: RUQ abdominal pain  post: esophagitis and mild gastritis    MULTIPLE TOOTH EXTRACTIONS      FULL MOUTH  TEETH REMOVED    TOTAL SHOULDER ARTHROPLASTY W/ DISTAL CLAVICLE EXCISION Right 11/16/2017    Procedure: Reverse Total Shoulder Arthroplasty;  Surgeon: Sabino Harley MD;  Location: [facility];  Service:        Social History     Socioeconomic History    Marital status: Single   Tobacco Use    Smoking status: Never     Passive exposure: Never    Smokeless tobacco: Never   Vaping Use    Vaping Use: Never used   Substance and Sexual Activity    Alcohol use: No    Drug use: No    Sexual activity: Defer       Family History   Problem Relation Age of Onset    Alcohol abuse Mother     Other Mother         CORONARY ARTERIOSCLEROSIS    Cancer Father         prostate    Malig Hyperthermia Neg Hx        The following portion of the patient's history were reviewed and updated as appropriate: past medical history, past surgical history, past social history, past family history, allergies, current medications, and problem list.    Review of Systems   Constitutional: Negative.   Cardiovascular:  Positive for dyspnea on exertion (inclines). Negative for palpitations.   Respiratory: Negative.     Hematologic/Lymphatic: Negative.    Musculoskeletal:  Positive for back pain (with inclines).   Neurological: Negative.        No Known Allergies      Current Outpatient Medications:     acetaminophen (TYLENOL) 325 MG tablet, Take 2 tablets by mouth Every 4 (Four) Hours As Needed for Mild Pain ., Disp: , Rfl:     apixaban (ELIQUIS) 2.5 MG tablet tablet, Take 1 tablet by mouth Every 12 (Twelve) Hours. Indications: Atrial Fibrillation, Disp: 60 tablet, Rfl: 2    carvedilol (COREG) 25 MG tablet, Take 1 tablet by mouth 2 (Two) Times a Day With Meals., Disp: 60 tablet, Rfl: 2    digoxin (LANOXIN) 125 MCG tablet, Take 1 tablet on Monday, Wednesday, and Fridays, Disp: 20 tablet, Rfl: 1    ferrous sulfate 325 (65 FE) MG EC tablet, Take 1 tablet by mouth Daily With Breakfast., Disp: , Rfl:     fluorometholone (FML) 0.1 % ophthalmic suspension,  "SHAKE GENTLY AND INSTILL ONE DROP IN RIGHT EYE BID., Disp: , Rfl: 3    folic acid (FOLVITE) 1 MG tablet, Take 1 tablet by mouth Daily., Disp: , Rfl:     furosemide (Lasix) 20 MG tablet, Take 1 tablet by mouth Daily., Disp: , Rfl:     loteprednol (LOTEMAX) 0.5 % ophthalmic suspension, Administer 1 drop to both eyes 2 (Two) Times a Day., Disp: , Rfl:     methotrexate 2.5 MG tablet, 7 TABLETS PER WEEK, Disp: , Rfl:     ofloxacin (OCUFLOX) 0.3 % ophthalmic solution, INSTILL 1 DROP INTO RIGHT EYE 3 TIMES A DAY FOR 1 WEEK, Disp: , Rfl: 0    pantoprazole (PROTONIX) 40 MG EC tablet, Take 1 tablet by mouth daily., Disp: 90 tablet, Rfl: 3    potassium chloride ER (K-TAB) 20 MEQ tablet controlled-release ER tablet, Take 1 tablet by mouth Daily., Disp: 90 tablet, Rfl: 1         Objective:     Vitals:    12/05/23 1424   BP: 122/60   BP Location: Left arm   Patient Position: Sitting   Cuff Size: Small Adult   Pulse: 95   Weight: 49.4 kg (109 lb)   Height: 161.3 cm (63.5\")     Body mass index is 19 kg/m².    PHYSICAL EXAM:    Vitals Reviewed.   General Appearance: In no acute distress.  Thin.  Eyes: Glasses.   HENT: No hearing loss noted.    Respiratory: No signs of respiratory distress. Respiration rhythm and depth normal.  Clear to auscultation.   Cardiovascular:  Jugular Venous Pressure: Normal  Heart Rate and Rhythm: Irregular, irregular.  Heart Sounds: Normal S1 and S2. No S3 or S4 noted.  Murmurs: LUSB grade 2/6 systolic murmur present.  Lower Extremities: Bilateral trace lower extremity edema.   Musculoskeletal: Normal movement of extremities.  Skin: General appearance normal.  Ro.  Psychiatric: Patient alert and oriented to person, place, and time. Speech and behavior appropriate. Normal mood and affect.       ECG 12 Lead    Date/Time: 12/6/2023 2:26 PM  Performed by: Maira Jay APRN    Authorized by: Maira Jay APRN  Comparison: compared with previous ECG from 10/19/2023  Similar to previous ECG  Rhythm: " atrial fibrillation  Rate: normal  BPM: 95  Conduction: conduction normal  ST Segments: ST segments normal  T Waves: T waves normal  QRS axis: normal    Clinical impression: abnormal EKG            Assessment:       Diagnosis Plan   1. Dyspnea on exertion        2. Shoulder blade pain        3. Permanent atrial fibrillation        4. Nonrheumatic aortic valve stenosis        5. Nonrheumatic mitral valve regurgitation        6. Nonrheumatic tricuspid valve regurgitation        7. Rheumatoid arthritis of multiple sites without organ or system involvement with positive rheumatoid factor                 Plan:       1/2.  Dyspnea on exertion and shoulder blade pain with inclines only.  She avoids inclines except when playing golf.  She is able to walk 4 miles on a flat surface without any anginal symptoms.  Recent nuclear stress test was normal.  I discussed with Dr. Miguel who said she possibly could have coronary artery disease, but feels that it is low risk at this time.  She does not want her on aspirin since she is taking the apixaban.  Start amlodipine 2.5 mg daily.  Call with any worsening symptoms.    3.  Permanent Atrial Fibrillation: Rate controlled with carvedilol and digoxin 3 times weekly..  She was taking digoxin daily, her digoxin level was elevated. [id number] score of 6.  Continue apixaban.    4/5/6.  Valvular heart disease: Moderate aortic stenosis, mild to moderate mitral regurgitation, and mild to moderate tricuspid regurgitation. Repeat echocardiogram in October 2024.     7.  Rheumatoid Arthritis: Followed by rheumatology.    8.  We recommend follow-up with Dr. Miguel in 3 months.    ADDENDUM:   Dr. Miguel decided that we should proceed with heart catheterization.  She would be a good candidate for CABG if necessary.  The procedure was explained to the patient and she would like to proceed.  Hospital scheduling to arrange.    As always, it has been a pleasure to participate in your patient's care. Thank  you.       Sincerely,         ANTONY Mclaughlin  [facility] Cardiology      Dictated utilizing Dragon Dictation

## 2023-12-05 NOTE — PROGRESS NOTES
Date of Office Visit: 2023  Encounter Provider: ANTONY Onofre  Place of Service: [facility] CARDIOLOGY  Patient Name: Bhumi Gimenez  :1940  Primary Cardiologist: Dr. Arcadio Miguel    Chief Complaint   Patient presents with    Follow-up    Shortness of Breath   :     HPI: Bhumi Gimenez is a 83 y.o. female who presents today for follow-up visit. I have reviewed her medical records.    She has been diagnosed with hypertension and rheumatoid arthritis.  She has a history of chronic DVT of the proximal vein of both lower extremities.    She has permanent atrial fibrillation treated with apixaban, carvedilol, and digoxin.  In , myocardial perfusion study was normal.    In 2023, she established care with Dr. Arcadio Miguel (previously Dr. Michael Subramanian's patient).  She reported chronic exertional dyspnea with inclines.  Her digoxin level is 1.2 and her digoxin was decreased to 1 tablet on Monday, Wednesday, and . Aspirin was discontinued.  Echocardiogram showed normal EF, left atrium severely dilated, aortic valve calcification/thickening, moderate aortic stenosis, mild to moderate mitral valve regurgitation, nodular mitral valve calcification, and mild to moderate tricuspid regurgitation.      In 2023, she followed up with me. She reported still having dyspnea and bilateral shoulder blade pain with inclines that resolves with rest.  In 2023, myocardial perfusion study was normal.    She presents today for follow-up visit. We reviewed the stress test results.  She has not been playing golf and avoid status, so she has not had any exertional symptoms.  She walks 4 miles daily on flat surface without any shortness of breath, chest pain, or back pain.  She reports that her younger brother had a heart attack and both her older brother and sister have had stents placed.  She denies chest pain, palpitations, edema, dizziness, syncope, or  bleeding.      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    Added automatically from request for surgery [id number]    Esophageal reflux     Full dentures     Hepatomegaly     LIKELY SECONDARY TO ALCOHOL USE    History of alcohol use     quit 11/13    History of transfusion     Hypertension     IBS (irritable bowel syndrome)     with diarrhea    Ileus, unspecified 05/14/2021    Left foot drop     LEFT FOOD DROP SECONDARY TO PERONEAL NERVE INJURY    Nonrheumatic aortic valve stenosis 08/11/2023    Osteoarthritis     OSTEO    Pancreatic pseudocyst resolved on CT 5/13/21 12/05/2016    Peripheral neuropathy     LEFT LOWER EXTREMITY    Permanent atrial fibrillation 08/11/2023    Personal history of gallstones     Personal history of malignant neoplasm of skin     S/P REMOVAL FROM LEFT KNEE    Primary osteoarthritis of both hands     Rheumatoid arthritis of multiple sites without organ or system involvement with positive rheumatoid factor 05/12/2021    Takotsubo syndrome     Likely secondary to acute illness in 11/13.  EF as low as 20-25% 11/27/13.  EF 63% by echo 4/14/14.    Thrombocytopenia        Past Surgical History:   Procedure Laterality Date    CHOLECYSTECTOMY      COLONOSCOPY N/A 9/8/2022    Procedure: COLONOSCOPY to cecum;  Surgeon: Jhonatan Casey Jr., MD;  Location: [facility] ENDOSCOPY;  Service: General;  Laterality: N/A;  pre: screening  post: normal    CORNEAL TRANSPLANT      ENDOSCOPY N/A 9/8/2022    Procedure: ESOPHAGOGASTRODUODENOSCOPY with cold biopsies;  Surgeon: Jhonatan Casey Jr., MD;  Location: [facility] ENDOSCOPY;  Service: General;  Laterality: N/A;  pre: RUQ abdominal pain  post: esophagitis and mild gastritis    MULTIPLE TOOTH EXTRACTIONS      FULL MOUTH  TEETH REMOVED    TOTAL SHOULDER ARTHROPLASTY W/ DISTAL CLAVICLE EXCISION Right 11/16/2017    Procedure: Reverse Total Shoulder Arthroplasty;  Surgeon: Sabino Harley MD;  Location: [facility];  Service:        Social History     Socioeconomic History    Marital status: Single   Tobacco Use    Smoking status: Never     Passive exposure: Never    Smokeless tobacco: Never   Vaping Use    Vaping Use: Never used   Substance and Sexual Activity    Alcohol use: No    Drug use: No    Sexual activity: Defer       Family History   Problem Relation Age of Onset    Alcohol abuse Mother     Other Mother         CORONARY ARTERIOSCLEROSIS    Cancer Father         prostate    Malig Hyperthermia Neg Hx        The following portion of the patient's history were reviewed and updated as appropriate: past medical history, past surgical history, past social history, past family history, allergies, current medications, and problem list.    Review of Systems   Constitutional: Negative.   Cardiovascular:  Positive for dyspnea on exertion (inclines). Negative for palpitations.   Respiratory: Negative.     Hematologic/Lymphatic: Negative.    Musculoskeletal:  Positive for back pain (with inclines).   Neurological: Negative.        No Known Allergies      Current Outpatient Medications:     acetaminophen (TYLENOL) 325 MG tablet, Take 2 tablets by mouth Every 4 (Four) Hours As Needed for Mild Pain ., Disp: , Rfl:     apixaban (ELIQUIS) 2.5 MG tablet tablet, Take 1 tablet by mouth Every 12 (Twelve) Hours. Indications: Atrial Fibrillation, Disp: 60 tablet, Rfl: 2    carvedilol (COREG) 25 MG tablet, Take 1 tablet by mouth 2 (Two) Times a Day With Meals., Disp: 60 tablet, Rfl: 2    digoxin (LANOXIN) 125 MCG tablet, Take 1 tablet on Monday, Wednesday, and Fridays, Disp: 20 tablet, Rfl: 1    ferrous sulfate 325 (65 FE) MG EC tablet, Take 1 tablet by mouth Daily With Breakfast., Disp: , Rfl:     fluorometholone (FML) 0.1 % ophthalmic suspension,  "SHAKE GENTLY AND INSTILL ONE DROP IN RIGHT EYE BID., Disp: , Rfl: 3    folic acid (FOLVITE) 1 MG tablet, Take 1 tablet by mouth Daily., Disp: , Rfl:     furosemide (Lasix) 20 MG tablet, Take 1 tablet by mouth Daily., Disp: , Rfl:     loteprednol (LOTEMAX) 0.5 % ophthalmic suspension, Administer 1 drop to both eyes 2 (Two) Times a Day., Disp: , Rfl:     methotrexate 2.5 MG tablet, 7 TABLETS PER WEEK, Disp: , Rfl:     ofloxacin (OCUFLOX) 0.3 % ophthalmic solution, INSTILL 1 DROP INTO RIGHT EYE 3 TIMES A DAY FOR 1 WEEK, Disp: , Rfl: 0    pantoprazole (PROTONIX) 40 MG EC tablet, Take 1 tablet by mouth daily., Disp: 90 tablet, Rfl: 3    potassium chloride ER (K-TAB) 20 MEQ tablet controlled-release ER tablet, Take 1 tablet by mouth Daily., Disp: 90 tablet, Rfl: 1         Objective:     Vitals:    12/05/23 1424   BP: 122/60   BP Location: Left arm   Patient Position: Sitting   Cuff Size: Small Adult   Pulse: 95   Weight: 49.4 kg (109 lb)   Height: 161.3 cm (63.5\")     Body mass index is 19 kg/m².    PHYSICAL EXAM:    Vitals Reviewed.   General Appearance: In no acute distress.  Thin.  Eyes: Glasses.   HENT: No hearing loss noted.    Respiratory: No signs of respiratory distress. Respiration rhythm and depth normal.  Clear to auscultation.   Cardiovascular:  Jugular Venous Pressure: Normal  Heart Rate and Rhythm: Irregular, irregular.  Heart Sounds: Normal S1 and S2. No S3 or S4 noted.  Murmurs: LUSB grade 2/6 systolic murmur present.  Lower Extremities: Bilateral trace lower extremity edema.   Musculoskeletal: Normal movement of extremities.  Skin: General appearance normal.  Ro.  Psychiatric: Patient alert and oriented to person, place, and time. Speech and behavior appropriate. Normal mood and affect.       ECG 12 Lead    Date/Time: 12/6/2023 2:26 PM  Performed by: Maira Jay APRN    Authorized by: Maira Jay APRN  Comparison: compared with previous ECG from 10/19/2023  Similar to previous ECG  Rhythm: " atrial fibrillation  Rate: normal  BPM: 95  Conduction: conduction normal  ST Segments: ST segments normal  T Waves: T waves normal  QRS axis: normal    Clinical impression: abnormal EKG            Assessment:       Diagnosis Plan   1. Dyspnea on exertion        2. Shoulder blade pain        3. Permanent atrial fibrillation        4. Nonrheumatic aortic valve stenosis        5. Nonrheumatic mitral valve regurgitation        6. Nonrheumatic tricuspid valve regurgitation        7. Rheumatoid arthritis of multiple sites without organ or system involvement with positive rheumatoid factor                 Plan:       1/2.  Dyspnea on exertion and shoulder blade pain with inclines only.  She avoids inclines except when playing golf.  She is able to walk 4 miles on a flat surface without any anginal symptoms.  Recent nuclear stress test was normal.  I discussed with Dr. Miguel who said she possibly could have coronary artery disease, but feels that it is low risk at this time.  She does not want her on aspirin since she is taking the apixaban.  Start amlodipine 2.5 mg daily.  Call with any worsening symptoms.    3.  Permanent Atrial Fibrillation: Rate controlled with carvedilol and digoxin 3 times weekly..  She was taking digoxin daily, her digoxin level was elevated. [id number] score of 6.  Continue apixaban.    4/5/6.  Valvular heart disease: Moderate aortic stenosis, mild to moderate mitral regurgitation, and mild to moderate tricuspid regurgitation. Repeat echocardiogram in October 2024.     7.  Rheumatoid Arthritis: Followed by rheumatology.    8.  We recommend follow-up with Dr. Miguel in 3 months.    ADDENDUM:   Dr. Miguel decided that we should proceed with heart catheterization.  She would be a good candidate for CABG if necessary.  The procedure was explained to the patient and she would like to proceed.  Hospital scheduling to arrange.    As always, it has been a pleasure to participate in your patient's care. Thank  you.       Sincerely,         ANTONY Mclaughlin  [facility] Cardiology      Dictated utilizing Dragon Dictation

## 2023-12-06 ENCOUNTER — TELEPHONE (OUTPATIENT)
Dept: CARDIOLOGY | Facility: CLINIC | Age: 83
End: 2023-12-06
Payer: MEDICARE

## 2023-12-06 PROBLEM — Z12.11 SCREENING FOR COLON CANCER: Status: RESOLVED | Noted: 2022-08-16 | Resolved: 2023-12-06

## 2023-12-06 PROCEDURE — 93000 ELECTROCARDIOGRAM COMPLETE: CPT | Performed by: NURSE PRACTITIONER

## 2023-12-06 RX ORDER — AMLODIPINE BESYLATE 2.5 MG/1
2.5 TABLET ORAL DAILY
Qty: 90 TABLET | Refills: 1 | Status: SHIPPED | OUTPATIENT
Start: 2023-12-06

## 2023-12-06 NOTE — TELEPHONE ENCOUNTER
Dr. Miguel and I discussed the stress test and her recent possible anginal symptoms.  She recommend starting amlodipine 2.5 mg 1 tablet daily.  Monitor blood pressure at home and call with any concerns.  If her potential anginal symptoms worsen, to contact the office.     Ms. Gimenez was informed of results and verbalized understanding.     Dr. Miguel wants to see her back in the office in 3 months and we will call to arrange.

## 2023-12-07 NOTE — PROGRESS NOTES
I thought about her overnight -- I guess my concern is that she could have triple vessel disease, hence causing the normal stress. Would she be a good candidate for CABG if that were the case? If she is amenable to a cath, I think it's reasonable.    augustina

## 2023-12-08 ENCOUNTER — TELEPHONE (OUTPATIENT)
Dept: CARDIOLOGY | Facility: CLINIC | Age: 83
End: 2023-12-08
Payer: MEDICARE

## 2023-12-08 DIAGNOSIS — I48.21 PERMANENT ATRIAL FIBRILLATION: ICD-10-CM

## 2023-12-08 DIAGNOSIS — M89.8X1 SHOULDER BLADE PAIN: ICD-10-CM

## 2023-12-08 DIAGNOSIS — R06.09 DYSPNEA ON EXERTION: ICD-10-CM

## 2023-12-08 DIAGNOSIS — I20.89 CHRONIC STABLE ANGINA: Primary | ICD-10-CM

## 2023-12-08 DIAGNOSIS — I51.81 TAKOTSUBO CARDIOMYOPATHY: ICD-10-CM

## 2023-12-08 NOTE — TELEPHONE ENCOUNTER
Dr. Miguel recommended proceeding with left heart catheterization.  I discussed the procedure with the patient and she would like to proceed.

## 2023-12-08 NOTE — TELEPHONE ENCOUNTER
Please arrange heart catheterization to be completed in the near future for stable angina.  Thank you

## 2023-12-13 ENCOUNTER — TRANSCRIBE ORDERS (OUTPATIENT)
Dept: CARDIOLOGY | Facility: CLINIC | Age: 83
End: 2023-12-13
Payer: MEDICARE

## 2023-12-13 DIAGNOSIS — Z01.810 PRE-OPERATIVE CARDIOVASCULAR EXAMINATION: Primary | ICD-10-CM

## 2023-12-13 DIAGNOSIS — Z13.6 SCREENING FOR ISCHEMIC HEART DISEASE: ICD-10-CM

## 2023-12-13 PROBLEM — R06.09 DYSPNEA ON EXERTION: Status: ACTIVE | Noted: 2023-12-08

## 2023-12-13 PROBLEM — I20.89 CHRONIC STABLE ANGINA: Status: ACTIVE | Noted: 2023-12-08

## 2023-12-13 PROBLEM — I51.81 TAKOTSUBO CARDIOMYOPATHY: Status: ACTIVE | Noted: 2023-12-08

## 2023-12-13 PROBLEM — M89.8X1 SHOULDER BLADE PAIN: Status: ACTIVE | Noted: 2023-12-08

## 2023-12-15 ENCOUNTER — LAB (OUTPATIENT)
Dept: LAB | Facility: HOSPITAL | Age: 83
End: 2023-12-15
Payer: MEDICARE

## 2023-12-15 DIAGNOSIS — Z13.6 SCREENING FOR ISCHEMIC HEART DISEASE: ICD-10-CM

## 2023-12-15 DIAGNOSIS — Z01.810 PRE-OPERATIVE CARDIOVASCULAR EXAMINATION: ICD-10-CM

## 2023-12-15 LAB
ANION GAP SERPL CALCULATED.3IONS-SCNC: 11 MMOL/L (ref 5–15)
BASOPHILS # BLD AUTO: 0.05 10*3/MM3 (ref 0–0.2)
BASOPHILS NFR BLD AUTO: 0.8 % (ref 0–1.5)
BUN SERPL-MCNC: 8 MG/DL (ref 8–23)
BUN/CREAT SERPL: 7.3 (ref 7–25)
CALCIUM SPEC-SCNC: 9 MG/DL (ref 8.6–10.5)
CHLORIDE SERPL-SCNC: 100 MMOL/L (ref 98–107)
CO2 SERPL-SCNC: 24 MMOL/L (ref 22–29)
CREAT SERPL-MCNC: 1.1 MG/DL (ref 0.57–1)
DEPRECATED RDW RBC AUTO: 48 FL (ref 37–54)
EGFRCR SERPLBLD CKD-EPI 2021: 49.7 ML/MIN/1.73
EOSINOPHIL # BLD AUTO: 0.16 10*3/MM3 (ref 0–0.4)
EOSINOPHIL NFR BLD AUTO: 2.6 % (ref 0.3–6.2)
ERYTHROCYTE [DISTWIDTH] IN BLOOD BY AUTOMATED COUNT: 13.5 % (ref 12.3–15.4)
GLUCOSE SERPL-MCNC: 100 MG/DL (ref 65–99)
HCT VFR BLD AUTO: 34.5 % (ref 34–46.6)
HGB BLD-MCNC: 11.8 G/DL (ref 12–15.9)
IMM GRANULOCYTES # BLD AUTO: 0.03 10*3/MM3 (ref 0–0.05)
IMM GRANULOCYTES NFR BLD AUTO: 0.5 % (ref 0–0.5)
LYMPHOCYTES # BLD AUTO: 1.13 10*3/MM3 (ref 0.7–3.1)
LYMPHOCYTES NFR BLD AUTO: 18.3 % (ref 19.6–45.3)
MCH RBC QN AUTO: 33.9 PG (ref 26.6–33)
MCHC RBC AUTO-ENTMCNC: 34.2 G/DL (ref 31.5–35.7)
MCV RBC AUTO: 99.1 FL (ref 79–97)
MONOCYTES # BLD AUTO: 0.72 10*3/MM3 (ref 0.1–0.9)
MONOCYTES NFR BLD AUTO: 11.6 % (ref 5–12)
NEUTROPHILS NFR BLD AUTO: 4.1 10*3/MM3 (ref 1.7–7)
NEUTROPHILS NFR BLD AUTO: 66.2 % (ref 42.7–76)
NRBC BLD AUTO-RTO: 0.2 /100 WBC (ref 0–0.2)
PLATELET # BLD AUTO: 209 10*3/MM3 (ref 140–450)
PMV BLD AUTO: 10.5 FL (ref 6–12)
POTASSIUM SERPL-SCNC: 4.6 MMOL/L (ref 3.5–5.2)
RBC # BLD AUTO: 3.48 10*6/MM3 (ref 3.77–5.28)
SODIUM SERPL-SCNC: 135 MMOL/L (ref 136–145)
WBC NRBC COR # BLD AUTO: 6.19 10*3/MM3 (ref 3.4–10.8)

## 2023-12-15 PROCEDURE — 80048 BASIC METABOLIC PNL TOTAL CA: CPT

## 2023-12-15 PROCEDURE — 36415 COLL VENOUS BLD VENIPUNCTURE: CPT

## 2023-12-15 PROCEDURE — 85025 COMPLETE CBC W/AUTO DIFF WBC: CPT

## 2023-12-15 NOTE — PROGRESS NOTES
Triage nurses-BMP and CBC have some mild abnormalities.  I will defer her mild anemia to her PCP.  I have sent a copy to his office.  Kidney function is stable.  These labs were done in preparation for heart catheterization.  Please call her and let her know.

## 2023-12-20 ENCOUNTER — HOSPITAL ENCOUNTER (OUTPATIENT)
Facility: HOSPITAL | Age: 83
Setting detail: HOSPITAL OUTPATIENT SURGERY
Discharge: HOME | End: 2023-12-20
Attending: INTERNAL MEDICINE | Admitting: INTERNAL MEDICINE
Payer: MEDICARE

## 2023-12-20 VITALS
BODY MASS INDEX: 19.31 KG/M2 | HEART RATE: 108 BPM | RESPIRATION RATE: 16 BRPM | SYSTOLIC BLOOD PRESSURE: 126 MMHG | TEMPERATURE: 97.8 F | WEIGHT: 109 LBS | OXYGEN SATURATION: 97 % | DIASTOLIC BLOOD PRESSURE: 90 MMHG | HEIGHT: 63 IN

## 2023-12-20 DIAGNOSIS — I20.89 CHRONIC STABLE ANGINA: ICD-10-CM

## 2023-12-20 DIAGNOSIS — R06.09 DYSPNEA ON EXERTION: ICD-10-CM

## 2023-12-20 DIAGNOSIS — I48.21 PERMANENT ATRIAL FIBRILLATION: ICD-10-CM

## 2023-12-20 DIAGNOSIS — I51.81 TAKOTSUBO CARDIOMYOPATHY: ICD-10-CM

## 2023-12-20 DIAGNOSIS — M89.8X1 SHOULDER BLADE PAIN: ICD-10-CM

## 2023-12-20 PROCEDURE — 25010000002 HEPARIN (PORCINE) PER 1000 UNITS: Performed by: INTERNAL MEDICINE

## 2023-12-20 PROCEDURE — 25510000001 IOPAMIDOL PER 1 ML: Performed by: INTERNAL MEDICINE

## 2023-12-20 PROCEDURE — C1769 GUIDE WIRE: HCPCS | Performed by: INTERNAL MEDICINE

## 2023-12-20 PROCEDURE — C1894 INTRO/SHEATH, NON-LASER: HCPCS

## 2023-12-20 PROCEDURE — 93454 CORONARY ARTERY ANGIO S&I: CPT | Performed by: INTERNAL MEDICINE

## 2023-12-20 PROCEDURE — C1894 INTRO/SHEATH, NON-LASER: HCPCS | Performed by: INTERNAL MEDICINE

## 2023-12-20 PROCEDURE — 25010000002 FENTANYL CITRATE (PF) 50 MCG/ML SOLUTION: Performed by: INTERNAL MEDICINE

## 2023-12-20 PROCEDURE — 25810000003 SODIUM CHLORIDE 0.9 % SOLUTION: Performed by: INTERNAL MEDICINE

## 2023-12-20 PROCEDURE — 25010000002 MIDAZOLAM PER 1 MG: Performed by: INTERNAL MEDICINE

## 2023-12-20 RX ORDER — SODIUM CHLORIDE 0.9 % (FLUSH) 0.9 %
10 SYRINGE (ML) INJECTION EVERY 12 HOURS SCHEDULED
Status: DISCONTINUED | OUTPATIENT
Start: 2023-12-20 | End: 2023-12-20 | Stop reason: HOSPADM

## 2023-12-20 RX ORDER — SODIUM CHLORIDE 0.9 % (FLUSH) 0.9 %
10 SYRINGE (ML) INJECTION AS NEEDED
Status: DISCONTINUED | OUTPATIENT
Start: 2023-12-20 | End: 2023-12-20 | Stop reason: HOSPADM

## 2023-12-20 RX ORDER — MIDAZOLAM HYDROCHLORIDE 1 MG/ML
INJECTION INTRAMUSCULAR; INTRAVENOUS
Status: DISCONTINUED | OUTPATIENT
Start: 2023-12-20 | End: 2023-12-20 | Stop reason: HOSPADM

## 2023-12-20 RX ORDER — FENTANYL CITRATE 50 UG/ML
INJECTION, SOLUTION INTRAMUSCULAR; INTRAVENOUS
Status: DISCONTINUED | OUTPATIENT
Start: 2023-12-20 | End: 2023-12-20 | Stop reason: HOSPADM

## 2023-12-20 RX ORDER — SODIUM CHLORIDE 9 MG/ML
75 INJECTION, SOLUTION INTRAVENOUS CONTINUOUS
Status: DISCONTINUED | OUTPATIENT
Start: 2023-12-20 | End: 2023-12-20 | Stop reason: HOSPADM

## 2023-12-20 RX ORDER — HEPARIN SODIUM 1000 [USP'U]/ML
INJECTION, SOLUTION INTRAVENOUS; SUBCUTANEOUS
Status: DISCONTINUED | OUTPATIENT
Start: 2023-12-20 | End: 2023-12-20 | Stop reason: HOSPADM

## 2023-12-20 RX ORDER — ACETAMINOPHEN 325 MG/1
650 TABLET ORAL EVERY 4 HOURS PRN
Status: DISCONTINUED | OUTPATIENT
Start: 2023-12-20 | End: 2023-12-20 | Stop reason: HOSPADM

## 2023-12-20 RX ORDER — SODIUM CHLORIDE 9 MG/ML
40 INJECTION, SOLUTION INTRAVENOUS AS NEEDED
Status: DISCONTINUED | OUTPATIENT
Start: 2023-12-20 | End: 2023-12-20 | Stop reason: HOSPADM

## 2023-12-20 RX ORDER — VERAPAMIL HYDROCHLORIDE 2.5 MG/ML
INJECTION, SOLUTION INTRAVENOUS
Status: DISCONTINUED | OUTPATIENT
Start: 2023-12-20 | End: 2023-12-20 | Stop reason: HOSPADM

## 2023-12-20 RX ORDER — LIDOCAINE HYDROCHLORIDE 20 MG/ML
INJECTION, SOLUTION INFILTRATION; PERINEURAL
Status: DISCONTINUED | OUTPATIENT
Start: 2023-12-20 | End: 2023-12-20 | Stop reason: HOSPADM

## 2023-12-20 RX ADMIN — SODIUM CHLORIDE 75 ML/HR: 9 INJECTION, SOLUTION INTRAVENOUS at 07:38

## 2023-12-20 NOTE — DISCHARGE INSTRUCTIONS
[facility]  [address] [place] [address]    Coronary Angiogram (Radial/Ulnar Approach) After Care    Refer to this sheet in the next few weeks. These instructions provide you with information on caring for yourself after your procedure. Your caregiver may also give you more specific instructions. Your treatment has been planned according to current medical practices, but problems sometimes occur. Call your caregiver if you have any problems or questions after your procedure.    Home Care Instructions:  You may shower the day after the procedure. Remove the bandage (dressing) and gently wash the site with plain soap and water. Gently pat the site dry. You may apply a band aid daily for 2 days if desired.    Do not apply powder or lotion to the site.  Do not submerge the affected site in water for 3 to 5 days or until the site is completely healed.   Do not lift, push or pull anything over 5 pounds for 5 days after your procedure or as directed by your physician.  As a reference, a gallon of milk weighs 8 pounds.   Inspect the site at least twice daily. You may notice some bruising at the site and it may be tender for 1 to 2 weeks.     Increase your fluid intake for the next 2 days.    Keep arm elevated for 24 hours. For the remainder of the day, keep your arm in “Pledge of Allegiance” position when up and about.     You may drive 24 hours after the procedure unless otherwise instructed by your caregiver.  Do not operate machinery or power tools for 24 hours.  A responsible adult should be with you for the first 24 hours after you arrive home. Do not make any important legal decisions or sign legal papers for 24 hours.  Do not drink alcohol for 24 hours.    Metformin or any medications containing Metformin should not be taken for 48 hours after your procedure.      Call Your Doctor if:   You have unusual pain at the radial/ulnar (wrist) site.  You have redness, warmth, swelling, or pain at the  radial/ulnar (wrist) site.  You have drainage (other than a small amount of blood on the dressing).  `You have chills or a fever > 101.  Your arm becomes pale or dark, cool, tingly, or numb.  You develop chest pain, shortness of breath, feel faint or pass out.    You have heavy bleeding from the site, hold pressure on the site for 20 minutes.  If the bleeding stops, apply a fresh bandage and call your cardiologist.  However, if you        continue to have bleeding, call 911 and continue to apply pressure to the site.   You have any symptoms of a stroke.  Remember BE FAST  B-balance. Sudden trouble walking or loss of balance.  E-eyes.  Sudden changes in how you see or a sudden onset of a very bad headache.   F-face. Sudden weakness or loss of feeling of the face or facial droop on one side.   A-arms Sudden weakness or numbness in one arm.  One arm drifts down if they are both held out in front of you. This happens suddenly and usually on one side of the body.   S-speech.  Sudden trouble speaking, slurred speech or trouble understanding what are saying.   T-time  Time to call emergency services.  Write down the symptoms and the time they started.

## 2023-12-20 NOTE — Clinical Note
Hemostasis started on the right radial artery. R-Band was used in achieving hemostasis. Radial compression device applied to vessel. Hemostasis achieved successfully. Closure device additional comment: 12cc air in band

## 2023-12-21 ENCOUNTER — TELEPHONE (OUTPATIENT)
Dept: CARDIOLOGY | Facility: CLINIC | Age: 83
End: 2023-12-21
Payer: MEDICARE

## 2023-12-21 RX ORDER — ATORVASTATIN CALCIUM 10 MG/1
10 TABLET, FILM COATED ORAL DAILY
Qty: 90 TABLET | Refills: 1 | Status: SHIPPED | OUTPATIENT
Start: 2023-12-21

## 2023-12-21 NOTE — TELEPHONE ENCOUNTER
I spoke with Mr. Gimenez via phone.  We reviewed the heart catheterization results and she was noted to have nonobstructive coronary artery disease.  She has not really exerted herself so she will continue with the amlodipine 2.5 mg daily except to see if that helps her exertional symptoms.    I reviewed her last lipid panel which looked great.  Will start her on low-dose atorvastatin 10 mg daily.  Typically I would do 20 mg, but her LDL was 66.  There is a contraindication to taking aspirin with methotrexate it was a very high risk.  She is already on apixaban and I will hold off on the aspirin.    She feels that she is doing well post catheterization.  She will call with any concerns.  Keep scheduled appointment with Dr. Miguel in March.  She will need a repeat lipid panel at that time.

## 2023-12-21 NOTE — PROGRESS NOTES
I discussed results with her via phone.  She is doing well post catheterization.  I am not can start aspirin because there is a very high risk contraindication with methotrexate and she is already on apixaban.  Start atorvastatin at 10 mg daily because her cholesterol is already excellent.  I just want the plaque stabilization.  Recheck lipids in 3 months.  Follow-up with Dr. Miguel in March.  Continue amlodipine so that we can see if this helps with her exertional symptoms.  She verbalized understanding.

## 2024-01-19 RX ORDER — DIGOXIN 125 MCG
TABLET ORAL
Qty: 60 TABLET | Refills: 3 | Status: SHIPPED | OUTPATIENT
Start: 2024-01-19

## 2024-01-24 ENCOUNTER — CLINICAL SUPPORT (OUTPATIENT)
Dept: ORTHOPEDIC SURGERY | Facility: CLINIC | Age: 84
End: 2024-01-24
Payer: MEDICARE

## 2024-01-24 VITALS — WEIGHT: 110 LBS | TEMPERATURE: 97.7 F | HEIGHT: 64 IN | BODY MASS INDEX: 18.78 KG/M2

## 2024-01-24 DIAGNOSIS — M19.019 ARTHRITIS OF SHOULDER: Primary | ICD-10-CM

## 2024-01-24 RX ORDER — LIDOCAINE HYDROCHLORIDE 20 MG/ML
2 INJECTION, SOLUTION EPIDURAL; INFILTRATION; INTRACAUDAL; PERINEURAL
Status: COMPLETED | OUTPATIENT
Start: 2024-01-24 | End: 2024-01-24

## 2024-01-24 RX ORDER — METHYLPREDNISOLONE ACETATE 80 MG/ML
80 INJECTION, SUSPENSION INTRA-ARTICULAR; INTRALESIONAL; INTRAMUSCULAR; SOFT TISSUE
Status: COMPLETED | OUTPATIENT
Start: 2024-01-24 | End: 2024-01-24

## 2024-01-24 RX ADMIN — METHYLPREDNISOLONE ACETATE 80 MG: 80 INJECTION, SUSPENSION INTRA-ARTICULAR; INTRALESIONAL; INTRAMUSCULAR; SOFT TISSUE at 10:06

## 2024-01-24 RX ADMIN — LIDOCAINE HYDROCHLORIDE 2 ML: 20 INJECTION, SOLUTION EPIDURAL; INFILTRATION; INTRACAUDAL; PERINEURAL at 10:06

## 2024-01-24 NOTE — PROGRESS NOTES
Ms. Gimenez would like to get a repeat injection today.  The risks, benefits and alternatives were discussed and the patient consented.  Going forward, the patient will follow-up as needed.    Sabino Harley MD    01/24/2024     Large Joint Arthrocentesis: L subacromial bursa  Date/Time: 1/24/2024 10:06 AM  Consent given by: patient  Site marked: site marked  Timeout: Immediately prior to procedure a time out was called to verify the correct patient, procedure, equipment, support staff and site/side marked as required   Supporting Documentation  Indications: pain   Procedure Details  Location: shoulder - L subacromial bursa  Preparation: Patient was prepped and draped in the usual sterile fashion  Needle gauge: 21G.  Approach: posterior  Medications administered: 80 mg methylPREDNISolone acetate 80 MG/ML; 2 mL lidocaine PF 2% 2 %  Patient tolerance: patient tolerated the procedure well with no immediate complications

## 2024-01-26 ENCOUNTER — HOSPITAL ENCOUNTER (OUTPATIENT)
Dept: GENERAL RADIOLOGY | Facility: HOSPITAL | Age: 84
Discharge: HOME | End: 2024-01-26
Admitting: STUDENT IN AN ORGANIZED HEALTH CARE EDUCATION/TRAINING PROGRAM
Payer: MEDICARE

## 2024-01-26 DIAGNOSIS — M79.671 PAIN IN RIGHT FOOT: ICD-10-CM

## 2024-01-26 DIAGNOSIS — M79.671 PAIN IN RIGHT FOOT: Primary | ICD-10-CM

## 2024-01-26 PROCEDURE — 73630 X-RAY EXAM OF FOOT: CPT

## 2024-02-27 RX ORDER — DIGOXIN 125 MCG
TABLET ORAL
Qty: 60 TABLET | Refills: 3 | Status: SHIPPED | OUTPATIENT
Start: 2024-02-27

## 2024-03-05 ENCOUNTER — APPOINTMENT (OUTPATIENT)
Dept: GENERAL RADIOLOGY | Facility: HOSPITAL | Age: 84
End: 2024-03-05
Payer: COMMERCIAL

## 2024-03-05 ENCOUNTER — HOSPITAL ENCOUNTER (EMERGENCY)
Facility: HOSPITAL | Age: 84
Discharge: HOME | End: 2024-03-05
Attending: EMERGENCY MEDICINE | Admitting: EMERGENCY MEDICINE
Payer: COMMERCIAL

## 2024-03-05 ENCOUNTER — APPOINTMENT (OUTPATIENT)
Dept: CT IMAGING | Facility: HOSPITAL | Age: 84
End: 2024-03-05
Payer: COMMERCIAL

## 2024-03-05 VITALS
HEIGHT: 63 IN | SYSTOLIC BLOOD PRESSURE: 142 MMHG | HEART RATE: 75 BPM | WEIGHT: 110 LBS | OXYGEN SATURATION: 100 % | BODY MASS INDEX: 19.49 KG/M2 | TEMPERATURE: 96.5 F | RESPIRATION RATE: 18 BRPM | DIASTOLIC BLOOD PRESSURE: 84 MMHG

## 2024-03-05 DIAGNOSIS — S51.812A SKIN TEAR OF LEFT FOREARM WITHOUT COMPLICATION, INITIAL ENCOUNTER: ICD-10-CM

## 2024-03-05 DIAGNOSIS — S42.035A CLOSED NONDISPLACED FRACTURE OF ACROMIAL END OF LEFT CLAVICLE, INITIAL ENCOUNTER: ICD-10-CM
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
## 2024-03-05 DIAGNOSIS — R18.8 FREE FLUID IN PELVIS: ICD-10-CM

## 2024-03-05 PROCEDURE — 71250 CT THORAX DX C-: CPT

## 2024-03-05 PROCEDURE — 72125 CT NECK SPINE W/O DYE: CPT

## 2024-03-05 PROCEDURE — 74176 CT ABD & PELVIS W/O CONTRAST: CPT

## 2024-03-05 PROCEDURE — 73030 X-RAY EXAM OF SHOULDER: CPT

## 2024-03-05 PROCEDURE — 70450 CT HEAD/BRAIN W/O DYE: CPT

## 2024-03-05 PROCEDURE — 99284 EMERGENCY DEPT VISIT MOD MDM: CPT

## 2024-03-05 RX ORDER — ONDANSETRON 4 MG/1
4 TABLET, ORALLY DISINTEGRATING ORAL 4 TIMES DAILY PRN
Qty: 15 TABLET | Refills: 0 | Status: SHIPPED | OUTPATIENT
Start: 2024-03-05

## 2024-03-05 RX ORDER — HYDROCODONE BITARTRATE AND ACETAMINOPHEN 5; 325 MG/1; MG/1
1 TABLET ORAL EVERY 6 HOURS PRN
Qty: 10 TABLET | Refills: 0 | Status: SHIPPED | OUTPATIENT
Start: 2024-03-05
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
## 2024-03-08 ENCOUNTER — OFFICE VISIT (OUTPATIENT)
Dept: WOUND CARE | Facility: HOSPITAL | Age: 84
End: 2024-03-08
Payer: COMMERCIAL

## 2024-03-08 ENCOUNTER — OFFICE VISIT (OUTPATIENT)
Dept: ORTHOPEDIC SURGERY | Facility: CLINIC | Age: 84
End: 2024-03-08
Payer: COMMERCIAL

## 2024-03-08 VITALS — WEIGHT: 109.8 LBS | TEMPERATURE: 98 F | BODY MASS INDEX: 19.45 KG/M2 | HEIGHT: 63 IN

## 2024-03-08 DIAGNOSIS — S42.032A TRAUMATIC CLOSED FRACTURE OF DISTAL CLAVICLE WITH MINIMAL DISPLACEMENT, LEFT, INITIAL ENCOUNTER: Primary | ICD-10-CM

## 2024-03-08 PROCEDURE — 99214 OFFICE O/P EST MOD 30 MIN: CPT | Performed by: ORTHOPAEDIC SURGERY
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
## 2024-03-18 ENCOUNTER — TELEPHONE (OUTPATIENT)
Dept: CARDIOLOGY | Facility: CLINIC | Age: 84
End: 2024-03-18
Payer: MEDICARE

## 2024-03-18 NOTE — TELEPHONE ENCOUNTER
Called to request labs the most recent would be from 08/2023.  They will be faxing to our office.

## 2024-03-18 NOTE — TELEPHONE ENCOUNTER
----- Message from Arcadio Miguel MD sent at 3/7/2024 10:02 AM EST -----  Will you call her PCP for most recent labs?     Ty  Jdk  ----- Message -----  From: Maira Jay APRN  Sent: 3/5/2024   7:46 PM EST  To: Arcadio Miguel MD      ----- Message -----  From: Maira Jay APRN  Sent: 3/4/2024  12:00 AM EST  To: ANTONY Sy      Seeing BIJU 3/13  She needs a lipid panel

## 2024-03-19 NOTE — PROGRESS NOTES
I Tried calling but got VM.    Dr Cheatham sent labs, her digoxin level is 1.2. Please verify that she takes 125mcg on MWF only. If so, ask her to cut it in half and take 1/2 tab on MWF only.     Marcellus jackman

## 2024-03-20 NOTE — PROGRESS NOTES
Notified pt of results and orders from Dr. Miguel. She verbalized understanding.    Thank you,    Vira Yeung, RN  Triage [facility] [place]  03/20/24 09:04 EDT

## 2024-03-25 ENCOUNTER — OFFICE VISIT (OUTPATIENT)
Dept: ORTHOPEDIC SURGERY | Facility: CLINIC | Age: 84
End: 2024-03-25
Payer: MEDICARE

## 2024-03-25 VITALS — TEMPERATURE: 98.3 F | HEIGHT: 64 IN | WEIGHT: 98.3 LBS | BODY MASS INDEX: 16.78 KG/M2

## 2024-03-25 DIAGNOSIS — S42.032A TRAUMATIC CLOSED FRACTURE OF DISTAL CLAVICLE WITH MINIMAL DISPLACEMENT, LEFT, INITIAL ENCOUNTER: Primary | ICD-10-CM

## 2024-03-25 DIAGNOSIS — Z09 FRACTURE FOLLOW-UP: ICD-10-CM

## 2024-03-25 PROCEDURE — 99213 OFFICE O/P EST LOW 20 MIN: CPT | Performed by: ORTHOPAEDIC SURGERY

## 2024-03-25 PROCEDURE — 1160F RVW MEDS BY RX/DR IN RCRD: CPT | Performed by: ORTHOPAEDIC SURGERY

## 2024-03-25 PROCEDURE — 1159F MED LIST DOCD IN RCRD: CPT | Performed by: ORTHOPAEDIC SURGERY

## 2024-03-25 PROCEDURE — 73000 X-RAY EXAM OF COLLAR BONE: CPT | Performed by: ORTHOPAEDIC SURGERY
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
## 2024-03-27 ENCOUNTER — OFFICE VISIT (OUTPATIENT)
Dept: WOUND CARE | Facility: HOSPITAL | Age: 84
End: 2024-03-27
Payer: MEDICARE

## 2024-03-27 PROCEDURE — 17250 CHEM CAUT OF GRANLTJ TISSUE: CPT

## 2024-04-17 ENCOUNTER — OFFICE VISIT (OUTPATIENT)
Dept: WOUND CARE | Facility: HOSPITAL | Age: 84
End: 2024-04-17
Payer: MEDICARE

## 2024-04-29 ENCOUNTER — OFFICE VISIT (OUTPATIENT)
Dept: ORTHOPEDIC SURGERY | Facility: CLINIC | Age: 84
End: 2024-04-29
Payer: MEDICARE

## 2024-04-29 ENCOUNTER — OFFICE VISIT (OUTPATIENT)
Dept: ORTHOPEDIC SURGERY | Facility: CLINIC | Age: 84
End: 2024-04-29
Payer: COMMERCIAL

## 2024-04-29 VITALS — BODY MASS INDEX: 18.74 KG/M2 | TEMPERATURE: 98.1 F | WEIGHT: 109.8 LBS | HEIGHT: 64 IN

## 2024-04-29 VITALS — WEIGHT: 109.8 LBS | BODY MASS INDEX: 18.74 KG/M2 | TEMPERATURE: 98.1 F | HEIGHT: 64 IN

## 2024-04-29 DIAGNOSIS — M19.019 ARTHRITIS OF SHOULDER: Primary | ICD-10-CM

## 2024-04-29 DIAGNOSIS — S42.032A TRAUMATIC CLOSED FRACTURE OF DISTAL CLAVICLE WITH MINIMAL DISPLACEMENT, LEFT, INITIAL ENCOUNTER: Primary | ICD-10-CM

## 2024-04-29 DIAGNOSIS — Z09 SURGERY FOLLOW-UP: ICD-10-CM

## 2024-04-29 PROCEDURE — 73000 X-RAY EXAM OF COLLAR BONE: CPT | Performed by: ORTHOPAEDIC SURGERY

## 2024-04-29 RX ORDER — METHYLPREDNISOLONE ACETATE 80 MG/ML
80 INJECTION, SUSPENSION INTRA-ARTICULAR; INTRALESIONAL; INTRAMUSCULAR; SOFT TISSUE
Status: COMPLETED | OUTPATIENT
Start: 2024-04-29 | End: 2024-04-29

## 2024-04-29 RX ORDER — LIDOCAINE HYDROCHLORIDE 10 MG/ML
2 INJECTION, SOLUTION EPIDURAL; INFILTRATION; INTRACAUDAL; PERINEURAL
Status: COMPLETED | OUTPATIENT
Start: 2024-04-29 | End: 2024-04-29

## 2024-04-29 RX ADMIN — LIDOCAINE HYDROCHLORIDE 2 ML: 10 INJECTION, SOLUTION EPIDURAL; INFILTRATION; INTRACAUDAL; PERINEURAL at 08:28

## 2024-04-29 RX ADMIN — METHYLPREDNISOLONE ACETATE 80 MG: 80 INJECTION, SUSPENSION INTRA-ARTICULAR; INTRALESIONAL; INTRAMUSCULAR; SOFT TISSUE at 08:28

## 2024-04-29 NOTE — PROGRESS NOTES
"Bhumi Gimenez    CC:  6 weeks s/p closed treatment of left clavicle    HPI: Patient returns to clinic today stating pain is nearly resolved.  Motion is nearly back to full.  No complaints.       Vitals:    04/29/24 0816   Temp: 98.1 °F (36.7 °C)   Weight: 49.8 kg (109 lb 12.8 oz)   Height: 161.3 cm (63.5\")       Exam: Skin is benign.  No significant tenderness over the fracture site.  Arm and forearm soft.  Shoulder moves fully without difficulty.  Good motor and sensory function distally.  Palpable radial pulse with good cap refill.      Imaging:  AP and AP axial views of the left clavicle are ordered and reviewed by me to evaluate alignment and for comparison purposes.  No new or concerning findings noted.  Fracture appears in unchanged alignment.  There does appear to be early callous formation.    Impression:   6 weeks s/p closed treatment of left clavicle    Plan:  Appropriate activity modifications and restrictions were discussed. She can gradually start to progress activity as tolerated.  I will see her back in 6 to 8weeks.    Sabino Harley MD    04/29/2024   "

## 2024-04-29 NOTE — PROGRESS NOTES
Ms. Gimenez would like to get a repeat left shoulder injection today.  The risks, benefits and alternatives were discussed and she consented.  Going forward, the patient will follow-up as needed.    Sabino Harley MD    04/29/2024       Large Joint Arthrocentesis: L subacromial bursa  Date/Time: 4/29/2024 8:28 AM  Consent given by: patient  Site marked: site marked  Timeout: Immediately prior to procedure a time out was called to verify the correct patient, procedure, equipment, support staff and site/side marked as required   Supporting Documentation  Indications: pain   Procedure Details  Location: shoulder - L subacromial bursa  Preparation: Patient was prepped and draped in the usual sterile fashion  Needle gauge: 21 G.  Approach: posterior  Medications administered: 2 mL lidocaine PF 1% 1 %; 80 mg methylPREDNISolone acetate 80 MG/ML  Patient tolerance: patient tolerated the procedure well with no immediate complications       
Medication (see EMAR) i.e. Neb, Tylenol, Motrin

## 2024-05-06 ENCOUNTER — TELEPHONE (OUTPATIENT)
Dept: ORTHOPEDIC SURGERY | Facility: CLINIC | Age: 84
End: 2024-05-06

## 2024-05-06 NOTE — TELEPHONE ENCOUNTER
Caller: Bhumi Gimenez    Relationship: Self    Best call back number: [phone]    What orders are you requesting (i.e. lab or imaging): XRAY LT MIDDLE BACK    In what timeframe would the patient need to come in: ASAP    Where will you receive your lab/imaging services:     Additional notes: NOTHING IN PN, PATIENT STATES THAT SHE DISCUSSED BACK PAIN WITH DR MCDUFFIE AND WAS TOLD TO CALL BACK IF IT PERSISTS

## 2024-05-09 NOTE — TELEPHONE ENCOUNTER
Caller: Bhumi Gimenez    Relationship to patient: Self    Best call back number: [phone]    Patient is needing: PT IS CALLING TO SET UP AN APPOINTMENT FOR HER BACK PAIN.. PT DOES NOT USE MYCHART AND WOULD PREFER A CALL BACK .. PLEASE ADVISE..

## 2024-05-13 ENCOUNTER — OFFICE VISIT (OUTPATIENT)
Dept: ORTHOPEDIC SURGERY | Facility: CLINIC | Age: 84
End: 2024-05-13
Payer: MEDICARE

## 2024-05-13 VITALS — TEMPERATURE: 97.8 F | WEIGHT: 112 LBS | HEIGHT: 63 IN | BODY MASS INDEX: 19.84 KG/M2

## 2024-05-13 DIAGNOSIS — M25.512 PERISCAPULAR PAIN OF LEFT SHOULDER: Primary | ICD-10-CM

## 2024-05-13 DIAGNOSIS — M54.9 MID BACK PAIN ON LEFT SIDE: ICD-10-CM

## 2024-05-13 PROCEDURE — 99214 OFFICE O/P EST MOD 30 MIN: CPT | Performed by: NURSE PRACTITIONER

## 2024-05-13 PROCEDURE — 1160F RVW MEDS BY RX/DR IN RCRD: CPT | Performed by: NURSE PRACTITIONER

## 2024-05-13 PROCEDURE — 1159F MED LIST DOCD IN RCRD: CPT | Performed by: NURSE PRACTITIONER

## 2024-05-13 RX ORDER — METHYLPREDNISOLONE 4 MG/1
TABLET ORAL
Qty: 21 TABLET | Refills: 0 | Status: SHIPPED | OUTPATIENT
Start: 2024-05-13
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
## 2024-05-29 ENCOUNTER — OFFICE VISIT (OUTPATIENT)
Dept: ORTHOPEDIC SURGERY | Facility: CLINIC | Age: 84
End: 2024-05-29
Payer: COMMERCIAL

## 2024-05-29 VITALS — WEIGHT: 109.8 LBS | BODY MASS INDEX: 19.45 KG/M2 | TEMPERATURE: 98.3 F | HEIGHT: 63 IN

## 2024-05-29 DIAGNOSIS — Z09 FRACTURE FOLLOW-UP: Primary | ICD-10-CM

## 2024-05-29 DIAGNOSIS — M25.512 TRIGGER POINT OF SHOULDER REGION, LEFT: ICD-10-CM

## 2024-05-29 RX ORDER — METHYLPREDNISOLONE ACETATE 80 MG/ML
80 INJECTION, SUSPENSION INTRA-ARTICULAR; INTRALESIONAL; INTRAMUSCULAR; SOFT TISSUE
Status: COMPLETED | OUTPATIENT
Start: 2024-05-29 | End: 2024-05-29

## 2024-05-29 RX ORDER — FERROUS SULFATE 325(65) MG
TABLET ORAL
COMMUNITY
Start: 2024-04-10

## 2024-05-29 RX ORDER — POTASSIUM CHLORIDE 20 MEQ/1
TABLET, EXTENDED RELEASE ORAL
COMMUNITY
Start: 2024-05-15

## 2024-05-29 RX ORDER — LIDOCAINE HYDROCHLORIDE 10 MG/ML
2 INJECTION, SOLUTION EPIDURAL; INFILTRATION; INTRACAUDAL; PERINEURAL
Status: COMPLETED | OUTPATIENT
Start: 2024-05-29 | End: 2024-05-29

## 2024-05-29 RX ADMIN — LIDOCAINE HYDROCHLORIDE 2 ML: 10 INJECTION, SOLUTION EPIDURAL; INFILTRATION; INTRACAUDAL; PERINEURAL at 09:21

## 2024-05-29 RX ADMIN — METHYLPREDNISOLONE ACETATE 80 MG: 80 INJECTION, SUSPENSION INTRA-ARTICULAR; INTRALESIONAL; INTRAMUSCULAR; SOFT TISSUE at 09:21
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
## 2024-06-03 RX ORDER — AMLODIPINE BESYLATE 2.5 MG/1
2.5 TABLET ORAL DAILY
Qty: 90 TABLET | Refills: 1 | Status: SHIPPED | OUTPATIENT
Start: 2024-06-03

## 2024-06-14 ENCOUNTER — TELEPHONE (OUTPATIENT)
Dept: ORTHOPEDIC SURGERY | Facility: CLINIC | Age: 84
End: 2024-06-14
Payer: MEDICARE

## 2024-06-14 DIAGNOSIS — M19.019 ARTHRITIS OF SHOULDER: ICD-10-CM

## 2024-06-14 DIAGNOSIS — M25.512 PERISCAPULAR PAIN OF LEFT SHOULDER: ICD-10-CM

## 2024-06-14 DIAGNOSIS — M25.512 TRIGGER POINT OF SHOULDER REGION, LEFT: Primary | ICD-10-CM

## 2024-06-14 NOTE — TELEPHONE ENCOUNTER
"Patient stated that you has given her an injections in her \"back\" and is still having pain and mentioned you would order an mri  "

## 2024-06-14 NOTE — TELEPHONE ENCOUNTER
Caller: Bhumi Gimenez    Relationship: Self    Best call back number:     What orders are you requesting (i.e. lab or imaging): MRI OF THE BACK    In what timeframe would the patient need to come in: ASAP    Where will you receive your lab/imaging services: [facility]

## 2024-06-30 ENCOUNTER — APPOINTMENT (OUTPATIENT)
Dept: GENERAL RADIOLOGY | Facility: HOSPITAL | Age: 84
End: 2024-06-30
Payer: MEDICARE

## 2024-06-30 ENCOUNTER — HOSPITAL ENCOUNTER (INPATIENT)
Facility: HOSPITAL | Age: 84
LOS: 3 days | Discharge: HOME | End: 2024-07-03
Attending: EMERGENCY MEDICINE | Admitting: INTERNAL MEDICINE
Payer: MEDICARE

## 2024-06-30 DIAGNOSIS — G57.92 NEUROPATHY OF LEFT LOWER EXTREMITY: ICD-10-CM

## 2024-06-30 DIAGNOSIS — I48.91 ATRIAL FIBRILLATION WITH RVR: Primary | ICD-10-CM

## 2024-06-30 DIAGNOSIS — S46.012A TRAUMATIC INCOMPLETE TEAR OF LEFT ROTATOR CUFF, INITIAL ENCOUNTER: ICD-10-CM

## 2024-06-30 DIAGNOSIS — R07.9 CHEST PAIN, UNSPECIFIED TYPE: ICD-10-CM

## 2024-06-30 PROBLEM — I20.89 CHRONIC STABLE ANGINA: Status: RESOLVED | Noted: 2023-12-08 | Resolved: 2024-06-30

## 2024-06-30 PROBLEM — K21.9 GERD (GASTROESOPHAGEAL REFLUX DISEASE): Status: ACTIVE | Noted: 2024-06-30

## 2024-06-30 PROBLEM — M06.9 RHEUMATOID ARTHRITIS INVOLVING MULTIPLE SITES: Status: ACTIVE | Noted: 2021-05-12

## 2024-06-30 PROBLEM — E87.1 HYPONATREMIA: Status: ACTIVE | Noted: 2024-06-30

## 2024-06-30 PROBLEM — R06.09 DYSPNEA ON EXERTION: Status: RESOLVED | Noted: 2023-12-08 | Resolved: 2024-06-30

## 2024-06-30 LAB
ALBUMIN SERPL-MCNC: 4.2 G/DL (ref 3.5–5.2)
ALBUMIN/GLOB SERPL: 1.9 G/DL
ALP SERPL-CCNC: 113 U/L (ref 39–117)
ALT SERPL W P-5'-P-CCNC: 18 U/L (ref 1–33)
ANION GAP SERPL CALCULATED.3IONS-SCNC: 11 MMOL/L (ref 5–15)
ANION GAP SERPL CALCULATED.3IONS-SCNC: 12.6 MMOL/L (ref 5–15)
AST SERPL-CCNC: 21 U/L (ref 1–32)
BASOPHILS # BLD AUTO: 0.03 10*3/MM3 (ref 0–0.2)
BASOPHILS # BLD AUTO: 0.05 10*3/MM3 (ref 0–0.2)
BASOPHILS NFR BLD AUTO: 0.4 % (ref 0–1.5)
BASOPHILS NFR BLD AUTO: 0.6 % (ref 0–1.5)
BILIRUB SERPL-MCNC: 0.6 MG/DL (ref 0–1.2)
BUN SERPL-MCNC: 7 MG/DL (ref 8–23)
BUN SERPL-MCNC: 7 MG/DL (ref 8–23)
BUN/CREAT SERPL: 8.8 (ref 7–25)
BUN/CREAT SERPL: 8.9 (ref 7–25)
CALCIUM SPEC-SCNC: 9.3 MG/DL (ref 8.6–10.5)
CALCIUM SPEC-SCNC: 9.5 MG/DL (ref 8.6–10.5)
CHLORIDE SERPL-SCNC: 101 MMOL/L (ref 98–107)
CHLORIDE SERPL-SCNC: 97 MMOL/L (ref 98–107)
CHOLEST SERPL-MCNC: 124 MG/DL (ref 0–200)
CO2 SERPL-SCNC: 20.4 MMOL/L (ref 22–29)
CO2 SERPL-SCNC: 24 MMOL/L (ref 22–29)
CREAT SERPL-MCNC: 0.79 MG/DL (ref 0.57–1)
CREAT SERPL-MCNC: 0.8 MG/DL (ref 0.57–1)
DEPRECATED RDW RBC AUTO: 46.4 FL (ref 37–54)
DEPRECATED RDW RBC AUTO: 46.6 FL (ref 37–54)
DIGOXIN SERPL-MCNC: <0.3 NG/ML (ref 0.6–1.2)
EGFRCR SERPLBLD CKD-EPI 2021: 72.3 ML/MIN/1.73
EGFRCR SERPLBLD CKD-EPI 2021: 73.4 ML/MIN/1.73
EOSINOPHIL # BLD AUTO: 0.04 10*3/MM3 (ref 0–0.4)
EOSINOPHIL # BLD AUTO: 0.05 10*3/MM3 (ref 0–0.4)
EOSINOPHIL NFR BLD AUTO: 0.4 % (ref 0.3–6.2)
EOSINOPHIL NFR BLD AUTO: 0.6 % (ref 0.3–6.2)
ERYTHROCYTE [DISTWIDTH] IN BLOOD BY AUTOMATED COUNT: 13.1 % (ref 12.3–15.4)
ERYTHROCYTE [DISTWIDTH] IN BLOOD BY AUTOMATED COUNT: 13.1 % (ref 12.3–15.4)
GEN 5 2HR TROPONIN T REFLEX: 10 NG/L
GLOBULIN UR ELPH-MCNC: 2.2 GM/DL
GLUCOSE SERPL-MCNC: 104 MG/DL (ref 65–99)
GLUCOSE SERPL-MCNC: 135 MG/DL (ref 65–99)
HCT VFR BLD AUTO: 40 % (ref 34–46.6)
HCT VFR BLD AUTO: 41.6 % (ref 34–46.6)
HDLC SERPL-MCNC: 78 MG/DL (ref 40–60)
HGB BLD-MCNC: 13.5 G/DL (ref 12–15.9)
HGB BLD-MCNC: 13.9 G/DL (ref 12–15.9)
IMM GRANULOCYTES # BLD AUTO: 0.02 10*3/MM3 (ref 0–0.05)
IMM GRANULOCYTES # BLD AUTO: 0.03 10*3/MM3 (ref 0–0.05)
IMM GRANULOCYTES NFR BLD AUTO: 0.3 % (ref 0–0.5)
IMM GRANULOCYTES NFR BLD AUTO: 0.3 % (ref 0–0.5)
LDLC SERPL CALC-MCNC: 34 MG/DL (ref 0–100)
LDLC/HDLC SERPL: 0.45 {RATIO}
LIPASE SERPL-CCNC: 36 U/L (ref 13–60)
LYMPHOCYTES # BLD AUTO: 0.89 10*3/MM3 (ref 0.7–3.1)
LYMPHOCYTES # BLD AUTO: 1.08 10*3/MM3 (ref 0.7–3.1)
LYMPHOCYTES NFR BLD AUTO: 13.5 % (ref 19.6–45.3)
LYMPHOCYTES NFR BLD AUTO: 9.9 % (ref 19.6–45.3)
MAGNESIUM SERPL-MCNC: 2.2 MG/DL (ref 1.6–2.4)
MCH RBC QN AUTO: 33 PG (ref 26.6–33)
MCH RBC QN AUTO: 33.3 PG (ref 26.6–33)
MCHC RBC AUTO-ENTMCNC: 33.4 G/DL (ref 31.5–35.7)
MCHC RBC AUTO-ENTMCNC: 33.8 G/DL (ref 31.5–35.7)
MCV RBC AUTO: 98.5 FL (ref 79–97)
MCV RBC AUTO: 98.8 FL (ref 79–97)
MONOCYTES # BLD AUTO: 0.65 10*3/MM3 (ref 0.1–0.9)
MONOCYTES # BLD AUTO: 0.72 10*3/MM3 (ref 0.1–0.9)
MONOCYTES NFR BLD AUTO: 8 % (ref 5–12)
MONOCYTES NFR BLD AUTO: 8.1 % (ref 5–12)
NEUTROPHILS NFR BLD AUTO: 6.16 10*3/MM3 (ref 1.7–7)
NEUTROPHILS NFR BLD AUTO: 7.25 10*3/MM3 (ref 1.7–7)
NEUTROPHILS NFR BLD AUTO: 77.1 % (ref 42.7–76)
NEUTROPHILS NFR BLD AUTO: 80.8 % (ref 42.7–76)
NRBC BLD AUTO-RTO: 0 /100 WBC (ref 0–0.2)
NRBC BLD AUTO-RTO: 0 /100 WBC (ref 0–0.2)
NT-PROBNP SERPL-MCNC: 1558 PG/ML (ref 0–1800)
PLATELET # BLD AUTO: 190 10*3/MM3 (ref 140–450)
PLATELET # BLD AUTO: 221 10*3/MM3 (ref 140–450)
PMV BLD AUTO: 9.2 FL (ref 6–12)
PMV BLD AUTO: 9.8 FL (ref 6–12)
POTASSIUM SERPL-SCNC: 4 MMOL/L (ref 3.5–5.2)
POTASSIUM SERPL-SCNC: 4 MMOL/L (ref 3.5–5.2)
PROT SERPL-MCNC: 6.4 G/DL (ref 6–8.5)
RBC # BLD AUTO: 4.06 10*6/MM3 (ref 3.77–5.28)
RBC # BLD AUTO: 4.21 10*6/MM3 (ref 3.77–5.28)
SODIUM SERPL-SCNC: 132 MMOL/L (ref 136–145)
SODIUM SERPL-SCNC: 134 MMOL/L (ref 136–145)
TRIGL SERPL-MCNC: 54 MG/DL (ref 0–150)
TROPONIN T DELTA: -1 NG/L
TROPONIN T SERPL HS-MCNC: 11 NG/L
VLDLC SERPL-MCNC: 12 MG/DL (ref 5–40)
WBC NRBC COR # BLD AUTO: 7.99 10*3/MM3 (ref 3.4–10.8)
WBC NRBC COR # BLD AUTO: 8.98 10*3/MM3 (ref 3.4–10.8)

## 2024-06-30 PROCEDURE — 93005 ELECTROCARDIOGRAM TRACING: CPT

## 2024-06-30 PROCEDURE — 84484 ASSAY OF TROPONIN QUANT: CPT

## 2024-06-30 PROCEDURE — 25010000002 HYDROMORPHONE PER 4 MG: Performed by: EMERGENCY MEDICINE

## 2024-06-30 PROCEDURE — 83690 ASSAY OF LIPASE: CPT | Performed by: EMERGENCY MEDICINE

## 2024-06-30 PROCEDURE — 83735 ASSAY OF MAGNESIUM: CPT | Performed by: INTERNAL MEDICINE

## 2024-06-30 PROCEDURE — 99285 EMERGENCY DEPT VISIT HI MDM: CPT

## 2024-06-30 PROCEDURE — 93010 ELECTROCARDIOGRAM REPORT: CPT | Performed by: INTERNAL MEDICINE

## 2024-06-30 PROCEDURE — 80053 COMPREHEN METABOLIC PANEL: CPT | Performed by: EMERGENCY MEDICINE

## 2024-06-30 PROCEDURE — 83880 ASSAY OF NATRIURETIC PEPTIDE: CPT | Performed by: EMERGENCY MEDICINE

## 2024-06-30 PROCEDURE — 71045 X-RAY EXAM CHEST 1 VIEW: CPT

## 2024-06-30 PROCEDURE — 25010000002 ONDANSETRON PER 1 MG: Performed by: EMERGENCY MEDICINE

## 2024-06-30 PROCEDURE — 99214 OFFICE O/P EST MOD 30 MIN: CPT | Performed by: INTERNAL MEDICINE

## 2024-06-30 PROCEDURE — 80162 ASSAY OF DIGOXIN TOTAL: CPT | Performed by: EMERGENCY MEDICINE

## 2024-06-30 PROCEDURE — 85025 COMPLETE CBC W/AUTO DIFF WBC: CPT | Performed by: EMERGENCY MEDICINE

## 2024-06-30 PROCEDURE — 36415 COLL VENOUS BLD VENIPUNCTURE: CPT

## 2024-06-30 PROCEDURE — 80061 LIPID PANEL: CPT | Performed by: INTERNAL MEDICINE

## 2024-06-30 PROCEDURE — 85025 COMPLETE CBC W/AUTO DIFF WBC: CPT | Performed by: INTERNAL MEDICINE

## 2024-06-30 PROCEDURE — 93005 ELECTROCARDIOGRAM TRACING: CPT | Performed by: INTERNAL MEDICINE

## 2024-06-30 RX ORDER — ACETAMINOPHEN 325 MG/1
650 TABLET ORAL EVERY 6 HOURS PRN
Status: DISCONTINUED | OUTPATIENT
Start: 2024-06-30 | End: 2024-06-30

## 2024-06-30 RX ORDER — AMLODIPINE BESYLATE 5 MG/1
2.5 TABLET ORAL DAILY
Status: DISCONTINUED | OUTPATIENT
Start: 2024-06-30 | End: 2024-07-03 | Stop reason: HOSPADM

## 2024-06-30 RX ORDER — POTASSIUM CHLORIDE 750 MG/1
10 TABLET, FILM COATED, EXTENDED RELEASE ORAL DAILY
Status: DISCONTINUED | OUTPATIENT
Start: 2024-06-30 | End: 2024-07-03 | Stop reason: HOSPADM

## 2024-06-30 RX ORDER — SODIUM CHLORIDE 0.9 % (FLUSH) 0.9 %
10 SYRINGE (ML) INJECTION EVERY 12 HOURS SCHEDULED
Status: DISCONTINUED | OUTPATIENT
Start: 2024-06-30 | End: 2024-07-03 | Stop reason: HOSPADM

## 2024-06-30 RX ORDER — FOLIC ACID 1 MG/1
1000 TABLET ORAL DAILY
Status: DISCONTINUED | OUTPATIENT
Start: 2024-06-30 | End: 2024-07-03 | Stop reason: HOSPADM

## 2024-06-30 RX ORDER — ACETAMINOPHEN 325 MG/1
325 TABLET ORAL EVERY 6 HOURS PRN
Status: DISCONTINUED | OUTPATIENT
Start: 2024-06-30 | End: 2024-06-30

## 2024-06-30 RX ORDER — SODIUM CHLORIDE 0.9 % (FLUSH) 0.9 %
10 SYRINGE (ML) INJECTION AS NEEDED
Status: DISCONTINUED | OUTPATIENT
Start: 2024-06-30 | End: 2024-07-03 | Stop reason: HOSPADM

## 2024-06-30 RX ORDER — FLUOROMETHOLONE 0.1 %
1 SUSPENSION, DROPS(FINAL DOSAGE FORM)(ML) OPHTHALMIC (EYE) 2 TIMES DAILY
Status: DISCONTINUED | OUTPATIENT
Start: 2024-06-30 | End: 2024-07-03 | Stop reason: HOSPADM

## 2024-06-30 RX ORDER — CARVEDILOL 25 MG/1
25 TABLET ORAL 2 TIMES DAILY WITH MEALS
Status: DISCONTINUED | OUTPATIENT
Start: 2024-06-30 | End: 2024-07-03 | Stop reason: HOSPADM

## 2024-06-30 RX ORDER — NITROGLYCERIN 0.4 MG/1
0.4 TABLET SUBLINGUAL
Status: DISCONTINUED | OUTPATIENT
Start: 2024-06-30 | End: 2024-07-03 | Stop reason: HOSPADM

## 2024-06-30 RX ORDER — SODIUM CHLORIDE 9 MG/ML
40 INJECTION, SOLUTION INTRAVENOUS AS NEEDED
Status: DISCONTINUED | OUTPATIENT
Start: 2024-06-30 | End: 2024-07-03 | Stop reason: HOSPADM

## 2024-06-30 RX ORDER — FUROSEMIDE 20 MG/1
20 TABLET ORAL DAILY
Status: DISCONTINUED | OUTPATIENT
Start: 2024-06-30 | End: 2024-07-03 | Stop reason: HOSPADM

## 2024-06-30 RX ORDER — BISACODYL 5 MG/1
5 TABLET, DELAYED RELEASE ORAL DAILY PRN
Status: DISCONTINUED | OUTPATIENT
Start: 2024-06-30 | End: 2024-07-03 | Stop reason: HOSPADM

## 2024-06-30 RX ORDER — AMOXICILLIN 250 MG
2 CAPSULE ORAL 2 TIMES DAILY PRN
Status: DISCONTINUED | OUTPATIENT
Start: 2024-06-30 | End: 2024-07-03 | Stop reason: HOSPADM

## 2024-06-30 RX ORDER — HYDROMORPHONE HYDROCHLORIDE 1 MG/ML
0.25 INJECTION, SOLUTION INTRAMUSCULAR; INTRAVENOUS; SUBCUTANEOUS ONCE
Status: COMPLETED | OUTPATIENT
Start: 2024-06-30 | End: 2024-06-30

## 2024-06-30 RX ORDER — LIDOCAINE 4 G/G
1 PATCH TOPICAL
Status: DISCONTINUED | OUTPATIENT
Start: 2024-06-30 | End: 2024-07-03 | Stop reason: HOSPADM

## 2024-06-30 RX ORDER — ATORVASTATIN CALCIUM 20 MG/1
10 TABLET, FILM COATED ORAL DAILY
Status: DISCONTINUED | OUTPATIENT
Start: 2024-06-30 | End: 2024-07-03 | Stop reason: HOSPADM

## 2024-06-30 RX ORDER — ONDANSETRON 2 MG/ML
4 INJECTION INTRAMUSCULAR; INTRAVENOUS ONCE
Status: COMPLETED | OUTPATIENT
Start: 2024-06-30 | End: 2024-06-30

## 2024-06-30 RX ORDER — BISACODYL 10 MG
10 SUPPOSITORY, RECTAL RECTAL DAILY PRN
Status: DISCONTINUED | OUTPATIENT
Start: 2024-06-30 | End: 2024-07-03 | Stop reason: HOSPADM

## 2024-06-30 RX ORDER — SODIUM CHLORIDE 9 MG/ML
75 INJECTION, SOLUTION INTRAVENOUS CONTINUOUS
Status: DISCONTINUED | OUTPATIENT
Start: 2024-06-30 | End: 2024-07-01

## 2024-06-30 RX ORDER — FERROUS SULFATE 325(65) MG
325 TABLET ORAL
Status: DISCONTINUED | OUTPATIENT
Start: 2024-06-30 | End: 2024-07-03 | Stop reason: HOSPADM

## 2024-06-30 RX ORDER — DIGOXIN 125 MCG
125 TABLET ORAL
Status: DISCONTINUED | OUTPATIENT
Start: 2024-06-30 | End: 2024-07-01

## 2024-06-30 RX ORDER — PANTOPRAZOLE SODIUM 40 MG/1
40 TABLET, DELAYED RELEASE ORAL
Status: DISCONTINUED | OUTPATIENT
Start: 2024-07-01 | End: 2024-07-03 | Stop reason: HOSPADM

## 2024-06-30 RX ORDER — ACETAMINOPHEN 325 MG/1
650 TABLET ORAL EVERY 4 HOURS PRN
Status: DISCONTINUED | OUTPATIENT
Start: 2024-06-30 | End: 2024-07-03 | Stop reason: HOSPADM

## 2024-06-30 RX ORDER — POLYETHYLENE GLYCOL 3350 17 G/17G
17 POWDER, FOR SOLUTION ORAL DAILY PRN
Status: DISCONTINUED | OUTPATIENT
Start: 2024-06-30 | End: 2024-07-03 | Stop reason: HOSPADM

## 2024-06-30 RX ADMIN — Medication 10 ML: at 14:25

## 2024-06-30 RX ADMIN — SODIUM CHLORIDE 75 ML/HR: 9 INJECTION, SOLUTION INTRAVENOUS at 14:39

## 2024-06-30 RX ADMIN — POTASSIUM CHLORIDE 10 MEQ: 750 TABLET, EXTENDED RELEASE ORAL at 14:25

## 2024-06-30 RX ADMIN — AMLODIPINE BESYLATE 2.5 MG: 5 TABLET ORAL at 12:31

## 2024-06-30 RX ADMIN — APIXABAN 2.5 MG: 2.5 TABLET, FILM COATED ORAL at 12:32

## 2024-06-30 RX ADMIN — ONDANSETRON 4 MG: 2 INJECTION INTRAMUSCULAR; INTRAVENOUS at 06:28

## 2024-06-30 RX ADMIN — METOPROLOL TARTRATE 5 MG: 1 INJECTION, SOLUTION INTRAVENOUS at 06:34

## 2024-06-30 RX ADMIN — FUROSEMIDE 20 MG: 20 TABLET ORAL at 12:31

## 2024-06-30 RX ADMIN — METOPROLOL TARTRATE 25 MG: 25 TABLET, FILM COATED ORAL at 06:28

## 2024-06-30 RX ADMIN — APIXABAN 2.5 MG: 2.5 TABLET, FILM COATED ORAL at 21:00

## 2024-06-30 RX ADMIN — FLUOROMETHOLONE 1 DROP: 1 SOLUTION/ DROPS OPHTHALMIC at 14:25

## 2024-06-30 RX ADMIN — ACETAMINOPHEN 325MG 650 MG: 325 TABLET ORAL at 21:00

## 2024-06-30 RX ADMIN — ACETAMINOPHEN 325MG 650 MG: 325 TABLET ORAL at 12:32

## 2024-06-30 RX ADMIN — CARVEDILOL 25 MG: 25 TABLET, FILM COATED ORAL at 17:00

## 2024-06-30 RX ADMIN — DIGOXIN 125 MCG: 125 TABLET ORAL at 16:56

## 2024-06-30 RX ADMIN — FLUOROMETHOLONE 1 DROP: 1 SOLUTION/ DROPS OPHTHALMIC at 21:00

## 2024-06-30 RX ADMIN — FERROUS SULFATE TAB 325 MG (65 MG ELEMENTAL FE) 325 MG: 325 (65 FE) TAB at 14:25

## 2024-06-30 RX ADMIN — FOLIC ACID 1000 MCG: 1 TABLET ORAL at 14:25

## 2024-06-30 RX ADMIN — Medication 10 ML: at 21:44

## 2024-06-30 RX ADMIN — ACETAMINOPHEN 325MG 650 MG: 325 TABLET ORAL at 16:56

## 2024-06-30 RX ADMIN — LIDOCAINE 1 PATCH: 4 PATCH TOPICAL at 14:25

## 2024-06-30 RX ADMIN — HYDROMORPHONE HYDROCHLORIDE 0.25 MG: 1 INJECTION, SOLUTION INTRAMUSCULAR; INTRAVENOUS; SUBCUTANEOUS at 06:29

## 2024-06-30 RX ADMIN — ATORVASTATIN CALCIUM 10 MG: 20 TABLET, FILM COATED ORAL at 14:25
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
## 2024-06-30 NOTE — ED TRIAGE NOTES
"Pt arrives to the ED via EMS from home. States she has chest pain and is SOA and has been ongoing for a couple hours. EMS states she has been in A-fib with rates any where from . Pt describes the pain as sharp and that \"it feels like someone was using a  knife on me\".   "

## 2024-06-30 NOTE — CONSULTS
"Date of Hospital Visit: 24  Encounter Provider: Arcadio Miguel MD  Place of Service: [facility] CARDIOLOGY  Patient Name: Bhumi Gimenez  :1940  Referral Provider: Dr Acevedo    Chief complaint: back pain radiating around    History of Present Illness    Ms. Gimenez is an 83yo woman who follows with me in the office. She has permanent atrial fibrillation. She has a history of Takotsubo CMP. She had a cath in December that showed mild-moderate nonobstructive CAD.    She has been struggling with paraspinous pain intermittently for months. Her orthopedic surgeon did some kind of injection which helped. However, it acutely worsened recently. The pain is left of her spine and radiates around her left axilla. It was so severe that she came to the ED. We were called for \"chest pain\" but she doesn't have chest pain. She walks 2-3 miles daily and is not limited by pain or dyspnea.     Her rate was fast upon arrival. It's improved greatly with a small dose of IV metoprolol. She was on 250mcg of digoxin daily at one point, but her level was high so we cut back to 125mcg daily. Her level was 1.2, which I thought was too high for an 83yo woman who weighs 50 kgs so I cut it back to 125mcg MWF. Now her level is undetectable.     Past Medical History:   Diagnosis Date    Anemia of chronic disorder     Biliary dyskinesia     Chronic deep venous thrombosis     chronic bilateral DVT    Chronic kidney disease     Chronic pancreatitis     ACUTE PANCREATITIS 2013    Collar bone fracture     Diverticulosis     Duodenal diverticulum 2022    Added automatically from request for surgery [id number]    Esophageal reflux     Full dentures     Hepatomegaly     LIKELY SECONDARY TO ALCOHOL USE    History of alcohol use     quit     History of transfusion     Hypertension     IBS (irritable bowel syndrome)     with diarrhea    Ileus, unspecified 2021    Left foot drop     LEFT FOOD DROP " SECONDARY TO PERONEAL NERVE INJURY    Nonrheumatic aortic valve stenosis 08/11/2023    Osteoarthritis     OSTEO    Pancreatic pseudocyst resolved on CT 5/13/21 12/05/2016    Peripheral neuropathy     LEFT LOWER EXTREMITY    Permanent atrial fibrillation 08/11/2023    Personal history of gallstones     Personal history of malignant neoplasm of skin     S/P REMOVAL FROM LEFT KNEE    Primary osteoarthritis of both hands     Rheumatoid arthritis of multiple sites without organ or system involvement with positive rheumatoid factor 05/12/2021    Takotsubo syndrome     Likely secondary to acute illness in 11/13.  EF as low as 20-25% 11/27/13.  EF 63% by echo 4/14/14.    Thrombocytopenia        Past Surgical History:   Procedure Laterality Date    CARDIAC CATHETERIZATION N/A 12/20/2023    Procedure: Coronary angiography;  Surgeon: Myrna Ulloa MD;  Location: [facility] CATH INVASIVE LOCATION;  Service: Cardiovascular;  Laterality: N/A;    CHOLECYSTECTOMY      COLONOSCOPY N/A 09/08/2022    Procedure: COLONOSCOPY to cecum;  Surgeon: Jhonatan Casey Jr., MD;  Location: [facility] ENDOSCOPY;  Service: General;  Laterality: N/A;  pre: screening  post: normal    CORNEAL TRANSPLANT      ENDOSCOPY N/A 09/08/2022    Procedure: ESOPHAGOGASTRODUODENOSCOPY with cold biopsies;  Surgeon: Jhonatan Casey Jr., MD;  Location: [facility] ENDOSCOPY;  Service: General;  Laterality: N/A;  pre: RUQ abdominal pain  post: esophagitis and mild gastritis    MULTIPLE TOOTH EXTRACTIONS      FULL MOUTH TEETH REMOVED    TOTAL SHOULDER ARTHROPLASTY W/ DISTAL CLAVICLE EXCISION Right 11/16/2017    Procedure: Reverse Total Shoulder Arthroplasty;  Surgeon: Sabino Harley MD;  Location: [facility] OR;  Service:        Prior to Admission medications    Medication Sig Start Date End Date Taking? Authorizing Provider   acetaminophen (TYLENOL) 325 MG tablet Take 2 tablets by mouth Every 4 (Four) Hours As Needed for Mild Pain . 5/14/21  Yes Niko Cheatham MD    amLODIPine (NORVASC) 2.5 MG tablet TAKE 1 TABLET BY MOUTH DAILY 6/3/24  Yes Maira Jay APRN   apixaban (ELIQUIS) 2.5 MG tablet tablet Take 1 tablet by mouth Every 12 (Twelve) Hours. Resumed on 12/22/2023.  Indications: Atrial Fibrillation 12/20/23  Yes Myrna Ulloa MD   atorvastatin (LIPITOR) 10 MG tablet Take 1 tablet by mouth Daily. 12/21/23  Yes Maira Jay APRN   carvedilol (COREG) 25 MG tablet Take 1 tablet by mouth 2 (Two) Times a Day With Meals. 5/14/21  Yes Niko Cheatham MD   digoxin (LANOXIN) 125 MCG tablet Take 1 tablet on Monday, Wednesday, and Fridays  Patient taking differently: Take 2 tablets by mouth Every Other Day. Take 1 tablet on Monday, Wednesday, and Fridays 2/27/24  Yes Maira Jay APRN   FeroSul 325 (65 Fe) MG tablet  4/10/24  Yes Damari Silveira MD   fluorometholone (FML) 0.1 % ophthalmic suspension SHAKE GENTLY AND INSTILL ONE DROP IN RIGHT EYE BID. 4/27/18  Yes Damari Silveira MD   furosemide (Lasix) 20 MG tablet Take 1 tablet by mouth Daily. 5/15/21  Yes Niko Cheatham MD   loteprednol (LOTEMAX) 0.5 % ophthalmic suspension Administer 1 drop to both eyes 2 (Two) Times a Day. 11/16/21  Yes Damari Silveira MD   methotrexate 2.5 MG tablet 7 TABLETS PER WEEK 1/12/23  Yes Damari Silveira MD   ofloxacin (OCUFLOX) 0.3 % ophthalmic solution INSTILL 1 DROP INTO RIGHT EYE 3 TIMES A DAY FOR 1 WEEK 4/19/18  Yes Damari Silveira MD   pantoprazole (PROTONIX) 40 MG EC tablet Take 1 tablet by mouth daily. 9/8/16  Yes Niko Cheatham MD   potassium chloride (KLOR-CON M20) 20 MEQ CR tablet  5/15/24  Yes Damari Silveira MD   folic acid (FOLVITE) 1 MG tablet Take 1 tablet by mouth Daily. 5/15/23   Damari Silveira MD   HYDROcodone-acetaminophen (NORCO) 5-325 MG per tablet Take 1 tablet by mouth Every 6 (Six) Hours As Needed for Moderate Pain. 3/5/24 6/30/24  Arcadio Stevens MD   ondansetron ODT (ZOFRAN-ODT) 4 MG disintegrating tablet  "Place 1 tablet on the tongue 4 (Four) Times a Day As Needed for Nausea or Vomiting. 3/5/24 6/30/24  Romulo Stewart PA   potassium chloride ER (K-TAB) 20 MEQ tablet controlled-release ER tablet Take 1 tablet by mouth Daily. 4/28/17 6/30/24  Niko Cheatham MD       Social History     Socioeconomic History    Marital status: Single   Tobacco Use    Smoking status: Never     Passive exposure: Never    Smokeless tobacco: Never   Vaping Use    Vaping status: Never Used   Substance and Sexual Activity    Alcohol use: No    Drug use: No    Sexual activity: Not Currently       Family History   Problem Relation Age of Onset    Alcohol abuse Mother     Other Mother         CORONARY ARTERIOSCLEROSIS    Cancer Father         30 years ago    Malig Hyperthermia Neg Hx        Review of Systems   Musculoskeletal:  Positive for back pain.   All other systems reviewed and are negative.       Objective:     Vitals:    06/30/24 0828 06/30/24 0843 06/30/24 0901 06/30/24 0920   BP:  (!) 144/102  (!) 144/102   BP Location:    Right arm   Patient Position:    Lying   Pulse: 101 91  101   Resp:    16   Temp:    98.6 °F (37 °C)   TempSrc:    Oral   SpO2: 100% 100% 100% 100%   Weight:       Height:         Body mass index is 20.37 kg/m².    Last Weight and Admission Weight        06/30/24  0642   Weight: 52.2 kg (115 lb)     Flowsheet Rows      Flowsheet Row First Filed Value   Admission Height 160 cm (63\") Documented at 06/30/2024 0642   Admission Weight 52.2 kg (115 lb) Documented at 06/30/2024 0642            No intake or output data in the 24 hours ending 06/30/24 1405      Physical Exam  Vitals reviewed.   Constitutional:       Appearance: She is well-developed.   HENT:      Head: Normocephalic.      Nose: Nose normal.      Mouth/Throat:      Pharynx: Oropharynx is clear.   Eyes:      Conjunctiva/sclera: Conjunctivae normal.   Neck:      Vascular: No JVD.   Cardiovascular:      Rate and Rhythm: Normal rate. Rhythm irregular.      " Pulses: Normal pulses.      Heart sounds: Normal heart sounds.   Pulmonary:      Effort: Pulmonary effort is normal.      Breath sounds: Normal breath sounds.   Abdominal:      Palpations: Abdomen is soft.   Musculoskeletal:         General: Normal range of motion.      Cervical back: Normal range of motion.   Skin:     General: Skin is warm and dry.      Findings: No rash.      Comments: Extremely bronze skin   Neurological:      General: No focal deficit present.      Mental Status: She is alert.   Psychiatric:         Mood and Affect: Mood normal.                 Lab Review:                Results from last 7 days   Lab Units 06/30/24  0624   SODIUM mmol/L 132*   POTASSIUM mmol/L 4.0   CHLORIDE mmol/L 97*   CO2 mmol/L 24.0   BUN mg/dL 7*   CREATININE mg/dL 0.79   GLUCOSE mg/dL 104*   CALCIUM mg/dL 9.5     Results from last 7 days   Lab Units 06/30/24  1020 06/30/24  0624   HSTROP T ng/L 10 11     Results from last 7 days   Lab Units 06/30/24  0624   WBC 10*3/mm3 7.99   HEMOGLOBIN g/dL 13.5   HEMATOCRIT % 40.0   PLATELETS 10*3/mm3 190         Results from last 7 days   Lab Units 06/30/24  1020   CHOLESTEROL mg/dL 124     Results from last 7 days   Lab Units 06/30/24  1020   MAGNESIUM mg/dL 2.2     Results from last 7 days   Lab Units 06/30/24  1020   CHOLESTEROL mg/dL 124   TRIGLYCERIDES mg/dL 54   HDL CHOL mg/dL 78*   LDL CHOL mg/dL 34         I personally viewed and interpreted the patient's EKG/Telemetry data      Assessment/Plan:     1. Chronic atrial fibrillation -- rate was fast due to acute pain. At home she was on carvedilol 25mg BID and digoxin 125mcg MWF. Her level is undetectable, but it was too high while on 125mcg daily. Will slightly increase to 125mcg 4x/week. Continue apixaban.    2. Paraspinous pain -- this is non-cardiac, d/w Dr Acevedo. She had a recent cath with no obstructive CAD. I do not recommend further ischemic testing/Tn checks.    3. HTN -- resume home amlodipine, furosemide, cont  carvedilol.

## 2024-06-30 NOTE — ED NOTES
"Nursing report ED to floor  Bhumi Gimenez  84 y.o.  female    HPI :  HPI (Adult)  Stated Reason for Visit: Chest pain and SOA  History Obtained From: patient, EMS    Chief Complaint  Chief Complaint   Patient presents with    Chest Pain    Shortness of Breath       Admitting doctor:   Agapito Acevedo MD    Admitting diagnosis:   The primary encounter diagnosis was Atrial fibrillation with RVR. A diagnosis of Chest pain, unspecified type was also pertinent to this visit.    Code status:   Current Code Status       Date Active Code Status Order ID Comments User Context       Prior            Allergies:   Patient has no known allergies.    Isolation:   No active isolations    Intake and Output  No intake or output data in the 24 hours ending 06/30/24 0751    Weight:       06/30/24  0642   Weight: 52.2 kg (115 lb)       Most recent vitals:   Vitals:    06/30/24 0616 06/30/24 0628 06/30/24 0642   BP: 135/85 (!) 139/103 (!) 155/116   BP Location: Right arm  Right arm   Patient Position: Lying  Lying   Pulse: (!) 188 (!) 155 107   Resp: 16  16   Temp: 98.2 °F (36.8 °C)     TempSrc: Tympanic     SpO2: 100%  100%   Weight:   52.2 kg (115 lb)   Height:   160 cm (63\")       Active LDAs/IV Access:   Lines, Drains & Airways       Active LDAs       Name Placement date Placement time Site Days    Peripheral IV Anterior;Left;Proximal Forearm --  --  Forearm  --                    Labs (abnormal labs have a star):   Labs Reviewed   COMPREHENSIVE METABOLIC PANEL - Abnormal; Notable for the following components:       Result Value    Glucose 104 (*)     BUN 7 (*)     Sodium 132 (*)     Chloride 97 (*)     All other components within normal limits    Narrative:     GFR Normal >60  Chronic Kidney Disease <60  Kidney Failure <15    The GFR formula is only valid for adults with stable renal function between ages 18 and 70.   DIGOXIN LEVEL - Abnormal; Notable for the following components:    Digoxin <0.30 (*)     All other components " within normal limits   CBC WITH AUTO DIFFERENTIAL - Abnormal; Notable for the following components:    MCV 98.5 (*)     MCH 33.3 (*)     Neutrophil % 77.1 (*)     Lymphocyte % 13.5 (*)     All other components within normal limits   TROPONIN - Normal    Narrative:     High Sensitive Troponin T Reference Range:  <14.0 ng/L- Negative Female for AMI  <22.0 ng/L- Negative Male for AMI  >=14 - Abnormal Female indicating possible myocardial injury.  >=22 - Abnormal Male indicating possible myocardial injury.   Clinicians would have to utilize clinical acumen, EKG, Troponin, and serial changes to determine if it is an Acute Myocardial Infarction or myocardial injury due to an underlying chronic condition.        LIPASE - Normal   BNP (IN-HOUSE) - Normal    Narrative:     This assay is used as an aid in the diagnosis of individuals suspected of having heart failure. It can be used as an aid in the diagnosis of acute decompensated heart failure (ADHF) in patients presenting with signs and symptoms of ADHF to the emergency department (ED). In addition, NT-proBNP of <300 pg/mL indicates ADHF is not likely.    Age Range Result Interpretation  NT-proBNP Concentration (pg/mL:      <50             Positive            >450                   Gray                 300-450                    Negative             <300    50-75           Positive            >900                  Gray                300-900                  Negative            <300      >75             Positive            >1800                  Gray                300-1800                  Negative            <300   HIGH SENSITIVITIY TROPONIN T 2HR   CBC AND DIFFERENTIAL    Narrative:     The following orders were created for panel order CBC & Differential.  Procedure                               Abnormality         Status                     ---------                               -----------         ------                     CBC Auto Differential[[id number]]         Abnormal            Final result                 Please view results for these tests on the individual orders.       EKG:   ECG 12 Lead Chest Pain   Preliminary Result   HEART SCCE=929  bpm   RR Ttbaezdz=420  ms   CA Interval=  ms   P Horizontal Axis=  deg   P Front Axis=  deg   QRSD Interval=84  ms   QT Ktvzbddj=286  ms   TOqX=441  ms   QRS Axis=56  deg   T Wave Axis=-1  deg   - ABNORMAL ECG -   Atrial fibrillation with rapid V-rate   Minimal ST depression, inferior leads   Borderline T abnormalities, inferior leads   Date and Time of Study:2024-06-30 06:21:01          Meds given in ED:   Medications   sodium chloride 0.9 % flush 10 mL (has no administration in time range)   metoprolol tartrate (LOPRESSOR) injection 5 mg (5 mg Intravenous Given 6/30/24 0634)   metoprolol tartrate (LOPRESSOR) tablet 25 mg (25 mg Oral Given 6/30/24 0628)   HYDROmorphone (DILAUDID) injection 0.25 mg (0.25 mg Intravenous Given 6/30/24 0629)   ondansetron (ZOFRAN) injection 4 mg (4 mg Intravenous Given 6/30/24 0628)       Imaging results:  XR Chest 1 View    Result Date: 6/30/2024  No focal pulmonary consolidation. Borderline heart size. Follow-up as clinical indications persist.  This report was finalized on 6/30/2024 6:37 AM by Dr. Jacinto Alvarenga M.D on Workstation: "[email or web address]       Ambulatory status:   - slide    Social issues:   Social History     Socioeconomic History    Marital status: Single   Tobacco Use    Smoking status: Never     Passive exposure: Never    Smokeless tobacco: Never   Vaping Use    Vaping status: Never Used   Substance and Sexual Activity    Alcohol use: No    Drug use: No    Sexual activity: Not Currently       Peripheral Neurovascular  Peripheral Neurovascular (Adult)  Peripheral Neurovascular WDL: WDL    Neuro Cognitive  Neuro Cognitive (Adult)  Cognitive/Neuro/Behavioral WDL: WDL    Learning  Learning Assessment (Adult)  Learning Readiness and Ability: no barriers identified    Respiratory  Respiratory  (Adult)  Airway WDL: WDL  Respiratory WDL  Respiratory WDL: WDL    Abdominal Pain       Pain Assessments  Pain (Adult)  (0-10) Pain Rating: Rest: 6  (0-10) Pain Rating: Activity: 7  Pain Location: chest    NIH Stroke Scale       Colleen Yeh RN  06/30/24 07:51 EDT

## 2024-06-30 NOTE — PLAN OF CARE
Goal Outcome Evaluation:  Plan of Care Reviewed With: patient           Outcome Evaluation: new admission

## 2024-07-01 ENCOUNTER — APPOINTMENT (OUTPATIENT)
Dept: MRI IMAGING | Facility: HOSPITAL | Age: 84
End: 2024-07-01
Payer: MEDICARE

## 2024-07-01 PROBLEM — S46.012A TRAUMATIC INCOMPLETE TEAR OF LEFT ROTATOR CUFF: Status: ACTIVE | Noted: 2024-07-01

## 2024-07-01 LAB
ANION GAP SERPL CALCULATED.3IONS-SCNC: 7 MMOL/L (ref 5–15)
BASOPHILS # BLD AUTO: 0.05 10*3/MM3 (ref 0–0.2)
BASOPHILS NFR BLD AUTO: 0.7 % (ref 0–1.5)
BUN SERPL-MCNC: 9 MG/DL (ref 8–23)
BUN/CREAT SERPL: 10.3 (ref 7–25)
CALCIUM SPEC-SCNC: 8.9 MG/DL (ref 8.6–10.5)
CHLORIDE SERPL-SCNC: 103 MMOL/L (ref 98–107)
CO2 SERPL-SCNC: 26 MMOL/L (ref 22–29)
CREAT SERPL-MCNC: 0.87 MG/DL (ref 0.57–1)
DEPRECATED RDW RBC AUTO: 45.5 FL (ref 37–54)
EGFRCR SERPLBLD CKD-EPI 2021: 65.4 ML/MIN/1.73
EOSINOPHIL # BLD AUTO: 0.15 10*3/MM3 (ref 0–0.4)
EOSINOPHIL NFR BLD AUTO: 2.1 % (ref 0.3–6.2)
ERYTHROCYTE [DISTWIDTH] IN BLOOD BY AUTOMATED COUNT: 12.8 % (ref 12.3–15.4)
GLUCOSE SERPL-MCNC: 96 MG/DL (ref 65–99)
HCT VFR BLD AUTO: 39.5 % (ref 34–46.6)
HGB BLD-MCNC: 13.2 G/DL (ref 12–15.9)
IMM GRANULOCYTES # BLD AUTO: 0.03 10*3/MM3 (ref 0–0.05)
IMM GRANULOCYTES NFR BLD AUTO: 0.4 % (ref 0–0.5)
LYMPHOCYTES # BLD AUTO: 1.26 10*3/MM3 (ref 0.7–3.1)
LYMPHOCYTES NFR BLD AUTO: 17.8 % (ref 19.6–45.3)
MCH RBC QN AUTO: 32.9 PG (ref 26.6–33)
MCHC RBC AUTO-ENTMCNC: 33.4 G/DL (ref 31.5–35.7)
MCV RBC AUTO: 98.5 FL (ref 79–97)
MONOCYTES # BLD AUTO: 0.79 10*3/MM3 (ref 0.1–0.9)
MONOCYTES NFR BLD AUTO: 11.1 % (ref 5–12)
NEUTROPHILS NFR BLD AUTO: 4.81 10*3/MM3 (ref 1.7–7)
NEUTROPHILS NFR BLD AUTO: 67.9 % (ref 42.7–76)
NRBC BLD AUTO-RTO: 0 /100 WBC (ref 0–0.2)
PLATELET # BLD AUTO: 186 10*3/MM3 (ref 140–450)
PMV BLD AUTO: 9.5 FL (ref 6–12)
POTASSIUM SERPL-SCNC: 4.3 MMOL/L (ref 3.5–5.2)
QT INTERVAL: 271 MS
QT INTERVAL: 316 MS
QT INTERVAL: 340 MS
QTC INTERVAL: 397 MS
QTC INTERVAL: 419 MS
QTC INTERVAL: 420 MS
RBC # BLD AUTO: 4.01 10*6/MM3 (ref 3.77–5.28)
SODIUM SERPL-SCNC: 136 MMOL/L (ref 136–145)
WBC NRBC COR # BLD AUTO: 7.09 10*3/MM3 (ref 3.4–10.8)

## 2024-07-01 PROCEDURE — 72141 MRI NECK SPINE W/O DYE: CPT

## 2024-07-01 PROCEDURE — 99232 SBSQ HOSP IP/OBS MODERATE 35: CPT | Performed by: INTERNAL MEDICINE

## 2024-07-01 PROCEDURE — 85025 COMPLETE CBC W/AUTO DIFF WBC: CPT | Performed by: INTERNAL MEDICINE

## 2024-07-01 PROCEDURE — 73221 MRI JOINT UPR EXTREM W/O DYE: CPT

## 2024-07-01 PROCEDURE — 25010000002 DIGOXIN PER 500 MCG: Performed by: INTERNAL MEDICINE

## 2024-07-01 PROCEDURE — 93010 ELECTROCARDIOGRAM REPORT: CPT | Performed by: INTERNAL MEDICINE

## 2024-07-01 PROCEDURE — 25810000003 SODIUM CHLORIDE 0.9 % SOLUTION: Performed by: INTERNAL MEDICINE

## 2024-07-01 PROCEDURE — 25010000002 HYDROMORPHONE PER 4 MG: Performed by: INTERNAL MEDICINE

## 2024-07-01 PROCEDURE — 80048 BASIC METABOLIC PNL TOTAL CA: CPT | Performed by: INTERNAL MEDICINE

## 2024-07-01 PROCEDURE — 93005 ELECTROCARDIOGRAM TRACING: CPT | Performed by: INTERNAL MEDICINE

## 2024-07-01 RX ORDER — HYDROMORPHONE HYDROCHLORIDE 1 MG/ML
0.25 INJECTION, SOLUTION INTRAMUSCULAR; INTRAVENOUS; SUBCUTANEOUS EVERY 4 HOURS PRN
Status: DISCONTINUED | OUTPATIENT
Start: 2024-07-01 | End: 2024-07-03 | Stop reason: HOSPADM

## 2024-07-01 RX ORDER — DIGOXIN 125 MCG
125 TABLET ORAL DAILY
Status: DISCONTINUED | OUTPATIENT
Start: 2024-07-02 | End: 2024-07-03 | Stop reason: HOSPADM

## 2024-07-01 RX ORDER — TRAMADOL HYDROCHLORIDE 50 MG/1
50 TABLET ORAL EVERY 6 HOURS PRN
Status: DISCONTINUED | OUTPATIENT
Start: 2024-07-01 | End: 2024-07-02

## 2024-07-01 RX ORDER — CYCLOBENZAPRINE HCL 10 MG
10 TABLET ORAL EVERY 8 HOURS PRN
Status: DISCONTINUED | OUTPATIENT
Start: 2024-07-01 | End: 2024-07-03 | Stop reason: HOSPADM

## 2024-07-01 RX ORDER — DIGOXIN 0.25 MG/ML
250 INJECTION INTRAMUSCULAR; INTRAVENOUS ONCE
Status: COMPLETED | OUTPATIENT
Start: 2024-07-01 | End: 2024-07-01

## 2024-07-01 RX ADMIN — CARVEDILOL 25 MG: 25 TABLET, FILM COATED ORAL at 17:44

## 2024-07-01 RX ADMIN — AMLODIPINE BESYLATE 2.5 MG: 5 TABLET ORAL at 08:50

## 2024-07-01 RX ADMIN — HYDROMORPHONE HYDROCHLORIDE 0.25 MG: 1 INJECTION, SOLUTION INTRAMUSCULAR; INTRAVENOUS; SUBCUTANEOUS at 20:17

## 2024-07-01 RX ADMIN — APIXABAN 2.5 MG: 2.5 TABLET, FILM COATED ORAL at 08:50

## 2024-07-01 RX ADMIN — APIXABAN 2.5 MG: 2.5 TABLET, FILM COATED ORAL at 20:17

## 2024-07-01 RX ADMIN — Medication 10 ML: at 20:17

## 2024-07-01 RX ADMIN — CYCLOBENZAPRINE 10 MG: 10 TABLET, FILM COATED ORAL at 01:09

## 2024-07-01 RX ADMIN — PANTOPRAZOLE SODIUM 40 MG: 40 TABLET, DELAYED RELEASE ORAL at 06:49

## 2024-07-01 RX ADMIN — FUROSEMIDE 20 MG: 20 TABLET ORAL at 08:50

## 2024-07-01 RX ADMIN — CYCLOBENZAPRINE 10 MG: 10 TABLET, FILM COATED ORAL at 17:44

## 2024-07-01 RX ADMIN — CYCLOBENZAPRINE 10 MG: 10 TABLET, FILM COATED ORAL at 08:50

## 2024-07-01 RX ADMIN — TRAMADOL HYDROCHLORIDE 50 MG: 50 TABLET ORAL at 11:14

## 2024-07-01 RX ADMIN — ACETAMINOPHEN 325MG 650 MG: 325 TABLET ORAL at 00:54

## 2024-07-01 RX ADMIN — FERROUS SULFATE TAB 325 MG (65 MG ELEMENTAL FE) 325 MG: 325 (65 FE) TAB at 08:50

## 2024-07-01 RX ADMIN — Medication 10 ML: at 08:51

## 2024-07-01 RX ADMIN — ATORVASTATIN CALCIUM 10 MG: 20 TABLET, FILM COATED ORAL at 08:50

## 2024-07-01 RX ADMIN — LIDOCAINE 1 PATCH: 4 PATCH TOPICAL at 08:50

## 2024-07-01 RX ADMIN — FOLIC ACID 1000 MCG: 1 TABLET ORAL at 08:50

## 2024-07-01 RX ADMIN — SODIUM CHLORIDE 75 ML/HR: 9 INJECTION, SOLUTION INTRAVENOUS at 04:07

## 2024-07-01 RX ADMIN — POTASSIUM CHLORIDE 10 MEQ: 750 TABLET, EXTENDED RELEASE ORAL at 08:50

## 2024-07-01 RX ADMIN — DIGOXIN 250 MCG: 0.25 INJECTION INTRAMUSCULAR; INTRAVENOUS at 11:14

## 2024-07-01 RX ADMIN — FLUOROMETHOLONE 1 DROP: 1 SOLUTION/ DROPS OPHTHALMIC at 08:51

## 2024-07-01 RX ADMIN — TRAMADOL HYDROCHLORIDE 50 MG: 50 TABLET ORAL at 17:44

## 2024-07-01 RX ADMIN — HYDROMORPHONE HYDROCHLORIDE 0.25 MG: 1 INJECTION, SOLUTION INTRAMUSCULAR; INTRAVENOUS; SUBCUTANEOUS at 12:54

## 2024-07-01 RX ADMIN — CARVEDILOL 25 MG: 25 TABLET, FILM COATED ORAL at 08:50

## 2024-07-01 NOTE — PLAN OF CARE
Goal Outcome Evaluation:  Plan of Care Reviewed With: patient        Progress: no change  Outcome Evaluation: vss. prn medications ordered and given with relief. ivf.

## 2024-07-01 NOTE — PROGRESS NOTES
LOS: 1 day   Patient Care Team:  Niko Cheatham MD as PCP - General (Internal Medicine)  Arcadio Miguel MD as Cardiologist (Cardiology)    Chief Complaint: Follow-up atrial fibrillation with RVR.    Interval History: Heart rate is still mildly elevated, but improved.  She is still having pain between her shoulder blades and her upper back.  No chest pain.    Vital Signs:  Temp:  [97.5 °F (36.4 °C)-98.1 °F (36.7 °C)] 98.1 °F (36.7 °C)  Heart Rate:  [] 102  Resp:  [16-20] 20  BP: ()/() 127/85    Intake/Output Summary (Last 24 hours) at 7/1/2024 1417  Last data filed at 7/1/2024 0400  Gross per 24 hour   Intake 360 ml   Output 900 ml   Net -540 ml       Physical Exam:   General Appearance:    No acute distress, alert and oriented x4   Lungs:     Clear to auscultation bilaterally     Heart:    Irregularly irregular rhythm with a mildly tachycardic rate.  III/VI SM RUSB.   Abdomen:     Soft, nontender, nondistended.    Extremities:   No clubbing, cyanosis, or edema.     Results Review:    Results from last 7 days   Lab Units 07/01/24  0454   SODIUM mmol/L 136   POTASSIUM mmol/L 4.3   CHLORIDE mmol/L 103   CO2 mmol/L 26.0   BUN mg/dL 9   CREATININE mg/dL 0.87   GLUCOSE mg/dL 96   CALCIUM mg/dL 8.9     Results from last 7 days   Lab Units 06/30/24  1020 06/30/24  0624   HSTROP T ng/L 10 11     Results from last 7 days   Lab Units 07/01/24  0454   WBC 10*3/mm3 7.09   HEMOGLOBIN g/dL 13.2   HEMATOCRIT % 39.5   PLATELETS 10*3/mm3 186         Results from last 7 days   Lab Units 06/30/24  1020   CHOLESTEROL mg/dL 124     Results from last 7 days   Lab Units 06/30/24  1020   MAGNESIUM mg/dL 2.2     Results from last 7 days   Lab Units 06/30/24  1020   CHOLESTEROL mg/dL 124   TRIGLYCERIDES mg/dL 54   HDL CHOL mg/dL 78*   LDL CHOL mg/dL 34       I reviewed the patient's new clinical results.        Assessment:  1.  Upper back pain and scapular pain  2.  Persistent atrial fibrillation  3.  Nonobstructive  coronary artery disease by cath 12/20/2023  4.  Moderate aortic stenosis  5.  Rheumatoid arthritis  6.  Hypertension    Plan:  -Her heart rate is still elevated.  I suspect that this is from her pain.  I am going to give her dose of IV digoxin 250 mcg today.   I will increase the digoxin to every day orally.  Continue Coreg 25 mg twice daily.    -Continue Eliquis 2.5 mg twice daily based on her age and body habitus.    -Aortic stenosis murmur still moderate.  Doubt this has progressed.    -MRI of the C-spine and left shoulder pending to further workup pain.  Cath recently with nonobstructive disease.  This is noncardiac in nature.    Parish Subramanian MD  07/01/24  14:17 EDT

## 2024-07-02 LAB
ANION GAP SERPL CALCULATED.3IONS-SCNC: 7.4 MMOL/L (ref 5–15)
BASOPHILS # BLD AUTO: 0.04 10*3/MM3 (ref 0–0.2)
BASOPHILS NFR BLD AUTO: 0.6 % (ref 0–1.5)
BUN SERPL-MCNC: 7 MG/DL (ref 8–23)
BUN/CREAT SERPL: 9.5 (ref 7–25)
CALCIUM SPEC-SCNC: 8.9 MG/DL (ref 8.6–10.5)
CHLORIDE SERPL-SCNC: 99 MMOL/L (ref 98–107)
CO2 SERPL-SCNC: 24.6 MMOL/L (ref 22–29)
CREAT SERPL-MCNC: 0.74 MG/DL (ref 0.57–1)
DEPRECATED RDW RBC AUTO: 47.1 FL (ref 37–54)
EGFRCR SERPLBLD CKD-EPI 2021: 79.4 ML/MIN/1.73
EOSINOPHIL # BLD AUTO: 0.17 10*3/MM3 (ref 0–0.4)
EOSINOPHIL NFR BLD AUTO: 2.5 % (ref 0.3–6.2)
ERYTHROCYTE [DISTWIDTH] IN BLOOD BY AUTOMATED COUNT: 13.2 % (ref 12.3–15.4)
GLUCOSE SERPL-MCNC: 92 MG/DL (ref 65–99)
HCT VFR BLD AUTO: 38.6 % (ref 34–46.6)
HGB BLD-MCNC: 12.9 G/DL (ref 12–15.9)
IMM GRANULOCYTES # BLD AUTO: 0.02 10*3/MM3 (ref 0–0.05)
IMM GRANULOCYTES NFR BLD AUTO: 0.3 % (ref 0–0.5)
LYMPHOCYTES # BLD AUTO: 1.37 10*3/MM3 (ref 0.7–3.1)
LYMPHOCYTES NFR BLD AUTO: 20.1 % (ref 19.6–45.3)
MCH RBC QN AUTO: 33.2 PG (ref 26.6–33)
MCHC RBC AUTO-ENTMCNC: 33.4 G/DL (ref 31.5–35.7)
MCV RBC AUTO: 99.2 FL (ref 79–97)
MONOCYTES # BLD AUTO: 0.86 10*3/MM3 (ref 0.1–0.9)
MONOCYTES NFR BLD AUTO: 12.6 % (ref 5–12)
NEUTROPHILS NFR BLD AUTO: 4.35 10*3/MM3 (ref 1.7–7)
NEUTROPHILS NFR BLD AUTO: 63.9 % (ref 42.7–76)
NRBC BLD AUTO-RTO: 0 /100 WBC (ref 0–0.2)
PLATELET # BLD AUTO: 177 10*3/MM3 (ref 140–450)
PMV BLD AUTO: 9.6 FL (ref 6–12)
POTASSIUM SERPL-SCNC: 4.2 MMOL/L (ref 3.5–5.2)
QT INTERVAL: 332 MS
QTC INTERVAL: 402 MS
RBC # BLD AUTO: 3.89 10*6/MM3 (ref 3.77–5.28)
SODIUM SERPL-SCNC: 131 MMOL/L (ref 136–145)
WBC NRBC COR # BLD AUTO: 6.81 10*3/MM3 (ref 3.4–10.8)

## 2024-07-02 PROCEDURE — 93005 ELECTROCARDIOGRAM TRACING: CPT | Performed by: INTERNAL MEDICINE

## 2024-07-02 PROCEDURE — 99222 1ST HOSP IP/OBS MODERATE 55: CPT | Performed by: ORTHOPAEDIC SURGERY

## 2024-07-02 PROCEDURE — 93010 ELECTROCARDIOGRAM REPORT: CPT | Performed by: INTERNAL MEDICINE

## 2024-07-02 PROCEDURE — 99232 SBSQ HOSP IP/OBS MODERATE 35: CPT | Performed by: INTERNAL MEDICINE

## 2024-07-02 PROCEDURE — 80048 BASIC METABOLIC PNL TOTAL CA: CPT | Performed by: INTERNAL MEDICINE

## 2024-07-02 PROCEDURE — 85025 COMPLETE CBC W/AUTO DIFF WBC: CPT | Performed by: INTERNAL MEDICINE

## 2024-07-02 PROCEDURE — 25810000003 SODIUM CHLORIDE 0.9 % SOLUTION: Performed by: INTERNAL MEDICINE

## 2024-07-02 RX ORDER — HYDROCODONE BITARTRATE AND ACETAMINOPHEN 5; 325 MG/1; MG/1
1 TABLET ORAL EVERY 6 HOURS PRN
Status: DISCONTINUED | OUTPATIENT
Start: 2024-07-02 | End: 2024-07-03 | Stop reason: HOSPADM

## 2024-07-02 RX ADMIN — APIXABAN 2.5 MG: 2.5 TABLET, FILM COATED ORAL at 20:04

## 2024-07-02 RX ADMIN — Medication 10 ML: at 20:06

## 2024-07-02 RX ADMIN — CARVEDILOL 25 MG: 25 TABLET, FILM COATED ORAL at 08:56

## 2024-07-02 RX ADMIN — AMLODIPINE BESYLATE 2.5 MG: 5 TABLET ORAL at 08:55

## 2024-07-02 RX ADMIN — FOLIC ACID 1000 MCG: 1 TABLET ORAL at 08:58

## 2024-07-02 RX ADMIN — Medication 10 ML: at 08:56

## 2024-07-02 RX ADMIN — FERROUS SULFATE TAB 325 MG (65 MG ELEMENTAL FE) 325 MG: 325 (65 FE) TAB at 08:55

## 2024-07-02 RX ADMIN — ATORVASTATIN CALCIUM 10 MG: 20 TABLET, FILM COATED ORAL at 08:55

## 2024-07-02 RX ADMIN — DIGOXIN 125 MCG: 125 TABLET ORAL at 08:55

## 2024-07-02 RX ADMIN — TRAMADOL HYDROCHLORIDE 50 MG: 50 TABLET ORAL at 06:52

## 2024-07-02 RX ADMIN — PANTOPRAZOLE SODIUM 40 MG: 40 TABLET, DELAYED RELEASE ORAL at 06:52

## 2024-07-02 RX ADMIN — POTASSIUM CHLORIDE 10 MEQ: 750 TABLET, EXTENDED RELEASE ORAL at 08:55

## 2024-07-02 RX ADMIN — FLUOROMETHOLONE 1 DROP: 1 SOLUTION/ DROPS OPHTHALMIC at 20:04

## 2024-07-02 RX ADMIN — HYDROCODONE BITARTRATE AND ACETAMINOPHEN 1 TABLET: 5; 325 TABLET ORAL at 20:05

## 2024-07-02 RX ADMIN — CYCLOBENZAPRINE 10 MG: 10 TABLET, FILM COATED ORAL at 01:53

## 2024-07-02 RX ADMIN — APIXABAN 2.5 MG: 2.5 TABLET, FILM COATED ORAL at 08:56

## 2024-07-02 RX ADMIN — FUROSEMIDE 20 MG: 20 TABLET ORAL at 08:55

## 2024-07-02 RX ADMIN — TRAMADOL HYDROCHLORIDE 50 MG: 50 TABLET ORAL at 00:33

## 2024-07-02 RX ADMIN — TRAMADOL HYDROCHLORIDE 50 MG: 50 TABLET ORAL at 12:16

## 2024-07-02 RX ADMIN — CYCLOBENZAPRINE 10 MG: 10 TABLET, FILM COATED ORAL at 12:16

## 2024-07-02 RX ADMIN — FLUOROMETHOLONE 1 DROP: 1 SOLUTION/ DROPS OPHTHALMIC at 08:56

## 2024-07-02 RX ADMIN — LIDOCAINE 1 PATCH: 4 PATCH TOPICAL at 08:56

## 2024-07-02 RX ADMIN — SODIUM CHLORIDE 500 ML: 9 INJECTION, SOLUTION INTRAVENOUS at 18:01

## 2024-07-02 NOTE — PROGRESS NOTES
[facility]  INTERNAL MEDICINE  NIKO CHEATHAM MD  [address] [place] [address]  Phone [phone] Fax [phone]  E-mail:  [email or web address]      INTERNAL MEDICINE DAILY PROGRESS NOTE  Niko Cheatham M.D.  2024            Patient Identification:  Name: Bhumi Gimenez  Age: 84 y.o.  Sex: female  :  1940  MRN: [id number]         Primary Care Physician: Niko Cheatham MD  LENGTH OF STAY 1 DAYS    Consults       Date and Time Order Name Status Description    2024 10:25 AM Inpatient Pain Medicine Physician Consult      2024 10:25 AM Inpatient Orthopedic Surgery Consult      2024  7:37 AM Cardiology (on-call MD unless specified)      2024  7:19 AM IP General Consult (Use specialty-specific consult if known)              Chief Complaint:   Chest pain and Shortness of breath    History of Present Illness:  Subjective     Interval History: Patient is a 84 y.o.female who presented with  complaints of left back and scapular pain which has been present on and off for the last 6 months.  Her pain has recently been evaluated by Dr. Harley in orthopedics and he had ordered an MRI of her C-spine and scapula which was scheduled for later this week.  He has also done trigger point injections into the paraspinous muscles and has had her go to physical therapy.  All of these efforts have helped somewhat but not completely relieved the discomfort that she is experiencing.  In the last few days the pain is flared up and become much more severe and as a result she came to the emergency room for evaluation.  Pain seems to radiate around to the left side of her chest and seems to originate somewhere in the area of the lower scapula.  Recent pain has indeed been more severe than her pain in the past.  patient was also discovered in the emergency room to have rapid ventricular response with her permanent atrial fibrillation and she was  totally unaware that this was going on when she arrived in the emergency room. Her only real symptom  was dyspnea with exertion that has been worse recently.  The symptoms seem to get worse when she walks up stairs or goes uphill.  Her shortness of breath has recently been a little more severe and was particularly of note when she was in the emergency room with atrial fibrillation and rapid ventricular rate.  Patient did have a cardiac catheterization done last year that showed no significant coronary disease and echocardiogram at that time also showed relatively good function of her heart.  She does have some mild valvular disease and some left atrial enlargement but these have not caused any significant disease for the patient.  Patient's heart rate was actually at 188 in the emergency room on presentation but fortunately her O2 sats were well-maintained and she was not hypotensive.  Her troponins were negative, her chest x-ray was negative, and EKG that was done in the ER showed heart rate down to 144.   labs were remarkable for a low sodium at 132 and a digoxin level that was undetectable.  Patient generally is very active and plays golf on a very regular basis without any particular symptoms until this pain in the thoracic and scapular area is developed.  Pain she is having now is very similar to the pain that she had before she had her right rotator cuff repaired.      7/1/2024.  I personally saw the patient for the first time during this hospitalization on this date in her room on [address].  Patient was resting quietly in bed at the time of my visit and had just finished eating her lunch.  She was sipping on a cup of coffee.  I wore full PPE as indicated including an N95 mask as needed, gloves when touching patient, goggles, and white lab coat.  I performed thorough hand hygiene before and after the patient visit.  Patient does seem a little anxious about the pain that she is experiencing.  She is anxious to  get to the bottom of the pain and is wondering if her MRIs have been ordered.  I did review her chart and did not find orders for the MRI of the cervical spine and the scapula and shoulder which were the test that had been ordered previously by Dr. Harley.  I did order these tests and I contacted Michelle who is the nurse that works with Dr. Harley and she will let him know of the need to see the patient.  When the MRI results came back later in the day showing a significant disruption of the left rotator cuff, I did contact Dr. Harley by text messaging and let him know of the result.  He plans to see the patient during his rounds tomorrow or try to see her between surgical cases.  I did relay this information to the patient who is looking forward to seeing Dr. Harley and working further on her issues tomorrow.    Review of Systems:    A comprehensive 14 point review of systems was negative except for:  Constitution:  positive for fatigue and malaise  Respiratory: positive for  cough, dry, pleuritic pain, and shortness of air  Cardiovascular: positive for  chest pressure / pain, at rest, irregular pulse, orthopnea, and palpitations  Gastrointestinal: positive for  change in bowel habits and early satiety  Genitourinary: postivie for  frequency  Hematologic / Lymphatic: positive for  fatigue  Musculoskeletal: positive for  back pain, joint pain, and muscle weakness  Neurological: positive for  difficulty walking, light headedness, and vertigo    Past Medical History:   Diagnosis Date    Anemia of chronic disorder     Biliary dyskinesia     Chronic deep venous thrombosis     chronic bilateral DVT    Chronic kidney disease     Chronic pancreatitis     ACUTE PANCREATITIS 11/20/2013    Collar bone fracture     Diverticulosis     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    Added automatically from request for surgery [id number]    Esophageal reflux     Full dentures     Hepatomegaly     LIKELY SECONDARY TO ALCOHOL USE    History  of alcohol use     quit 11/13    History of transfusion     Hypertension     IBS (irritable bowel syndrome)     with diarrhea    Ileus, unspecified 05/14/2021    Left foot drop     LEFT FOOD DROP SECONDARY TO PERONEAL NERVE INJURY    Nonrheumatic aortic valve stenosis 08/11/2023    Osteoarthritis     OSTEO    Pancreatic pseudocyst resolved on CT 5/13/21 12/05/2016    Peripheral neuropathy     LEFT LOWER EXTREMITY    Permanent atrial fibrillation 08/11/2023    Personal history of gallstones     Personal history of malignant neoplasm of skin     S/P REMOVAL FROM LEFT KNEE    Primary osteoarthritis of both hands     Rheumatoid arthritis of multiple sites without organ or system involvement with positive rheumatoid factor 05/12/2021    Takotsubo syndrome     Likely secondary to acute illness in 11/13.  EF as low as 20-25% 11/27/13.  EF 63% by echo 4/14/14.    Thrombocytopenia      Past Surgical History:   Procedure Laterality Date    CARDIAC CATHETERIZATION N/A 12/20/2023    Procedure: Coronary angiography;  Surgeon: Myrna Ulloa MD;  Location: [facility] CATH INVASIVE LOCATION;  Service: Cardiovascular;  Laterality: N/A;    CHOLECYSTECTOMY      COLONOSCOPY N/A 09/08/2022    Procedure: COLONOSCOPY to cecum;  Surgeon: Jhonatan Casey Jr., MD;  Location: [facility] ENDOSCOPY;  Service: General;  Laterality: N/A;  pre: screening  post: normal    CORNEAL TRANSPLANT      ENDOSCOPY N/A 09/08/2022    Procedure: ESOPHAGOGASTRODUODENOSCOPY with cold biopsies;  Surgeon: Jhonatan Casey Jr., MD;  Location: [facility] ENDOSCOPY;  Service: General;  Laterality: N/A;  pre: RUQ abdominal pain  post: esophagitis and mild gastritis    MULTIPLE TOOTH EXTRACTIONS      FULL MOUTH TEETH REMOVED    TOTAL SHOULDER ARTHROPLASTY W/ DISTAL CLAVICLE EXCISION Right 11/16/2017    Procedure: Reverse Total Shoulder Arthroplasty;  Surgeon: Sabino Harley MD;  Location: [facility] OR;  Service:      No Known Allergies    Family History   Problem Relation  "Age of Onset    Alcohol abuse Mother     Other Mother         CORONARY ARTERIOSCLEROSIS    Cancer Father         30 years ago    Malig Hyperthermia Neg Hx        Social History     Socioeconomic History    Marital status: Single   Tobacco Use    Smoking status: Never     Passive exposure: Never    Smokeless tobacco: Never   Vaping Use    Vaping status: Never Used   Substance and Sexual Activity    Alcohol use: No    Drug use: No    Sexual activity: Not Currently       PMH, FH, SH and ROS completed with Admission History and Physical and updated in EPIC system.        Objective     Scheduled Meds:amLODIPine, 2.5 mg, Oral, Daily  apixaban, 2.5 mg, Oral, Q12H  atorvastatin, 10 mg, Oral, Daily  carvedilol, 25 mg, Oral, BID With Meals  [START ON 7/2/2024] digoxin, 125 mcg, Oral, Daily  ferrous sulfate, 325 mg, Oral, Daily With Breakfast  fluorometholone, 1 drop, Both Eyes, BID  folic acid, 1,000 mcg, Oral, Daily  furosemide, 20 mg, Oral, Daily  Lidocaine, 1 patch, Transdermal, Q24H  pantoprazole, 40 mg, Oral, Q AM  potassium chloride, 10 mEq, Oral, Daily  sodium chloride, 10 mL, Intravenous, Q12H      Continuous Infusions:     Vital signs in last 24 hours:  Temp:  [97.5 °F (36.4 °C)-98.4 °F (36.9 °C)] 98.4 °F (36.9 °C)  Heart Rate:  [] 90  Resp:  [18-20] 18  BP: (102-147)/() 102/65    Intake/Output:    Intake/Output Summary (Last 24 hours) at 7/1/2024 5418  Last data filed at 7/1/2024 2015  Gross per 24 hour   Intake 240 ml   Output 900 ml   Net -660 ml       Exam:  /65 (BP Location: Right arm, Patient Position: Lying)   Pulse 90   Temp 98.4 °F (36.9 °C) (Oral)   Resp 18   Ht 160 cm (63\")   Wt 52.2 kg (115 lb)   SpO2 92%   BMI 20.37 kg/m²     Constitutional:  Alert, cooperative, no distress, AAOx3, resting comfortably   Head:      Normocephalic, without obvious abnormality, atraumatic   Eyes:     PERRLA, conjunctiva/corneas clear, no icterus, no conjunctival                                     " pallor, EOM's intact, both eyes      ENT and Mouth: Lips, tongue, gums normal; oral mucosa pink and moist   Neck:     Supple, symmetrical, trachea midline, no JVD  Respiratory:     Clear to auscultation bilaterally, respirations unlabored  Cardiovascular:  Regular rate and rhythm, S1 and S2 normal, no murmur,      no  Rub or gallop.  Pulses normal.    Gastrointestinal:   BS present x 4 Soft, non-tender, bowel sounds active,      no masses, no hepatosplenomegaly                                                     :       No hernia.  Normal exam for sex.         Musculoskeletal: Extremities normal, atraumatic, no cyanosis or edema     No arthropathy.  No deformity.  Gait normal                                                 Skin:   Skin is warm and dry,  no rashes, swelling or palpable lesions   Neurologic:  CN -XII intact, motor strength grossly intact, sensation grossly intact to light touch, no focal reflex deficits noted    Psychiatric:     Alert,oriented X3, no delusions, psychoses, depression or anxiety    Heme/Lymph/Imun:   No bruises, petechiae.  Lymph nodes normal in size/configuration       Data Review:  Lab Results   Component Value Date    CALCIUM 8.9 07/01/2024    PHOS 3.6 05/12/2021     Results from last 7 days   Lab Units 07/01/24  0454 06/30/24  1418 06/30/24  1020 06/30/24  0624   AST (SGOT) U/L  --   --   --  21   ALT (SGPT) U/L  --   --   --  18   MAGNESIUM mg/dL  --   --  2.2  --    SODIUM mmol/L 136 134*  --  132*   POTASSIUM mmol/L 4.3 4.0  --  4.0   CHLORIDE mmol/L 103 101  --  97*   CO2 mmol/L 26.0 20.4*  --  24.0   BUN mg/dL 9 7*  --  7*   CREATININE mg/dL 0.87 0.80  --  0.79   GLUCOSE mg/dL 96 135*  --  104*   CALCIUM mg/dL 8.9 9.3  --  9.5   WBC 10*3/mm3 7.09 8.98  --  7.99   HEMOGLOBIN g/dL 13.2 13.9  --  13.5   PLATELETS 10*3/mm3 186 221  --  190     Lab Results   Component Value Date    TROPONINT 10 06/30/2024     Estimated Creatinine Clearance: 39 mL/min (by C-G formula based on SCr  of 0.87 mg/dL).  WEIGHTS:     Wt Readings from Last 1 Encounters:   06/30/24 0642 52.2 kg (115 lb)         Assessment:    Atrial fibrillation with RVR    Rheumatoid arthritis involving multiple sites    Nonrheumatic aortic valve stenosis    Shoulder blade pain    Traumatic incomplete tear of left rotator cuff    Left foot drop    Chronic deep venous thrombosis    Essential hypertension    Irritable bowel syndrome with diarrhea    Glucose intolerance (impaired glucose tolerance) stress associated    Neuropathy of left lower extremity    Duodenal diverticulum    Dyspnea on exertion    Takotsubo cardiomyopathy    GERD (gastroesophageal reflux disease)    Hyponatremia    Hammer toe    Primary osteoarthritis of both hands      Attending Physician Assessment and Plan:     1.  Permanent atrial fibrillation with rapid ventricular rate at time of presentation in the emergency room. Cardiology has adjusted patient's medications and heart rate is under much better control at present time.  Digoxin has been reintroduced and dose has been corrected by cardiology to try to keep things under better control.  Patient breathing better now that the permanent atrial fibrillation is under better rate control.  Cardiology continuing to follow the patient for now.      2.  Left thoracic/scapular chest wall pain with cardiac cause ruled out and workup revealing disruption of left rotator cuff.   Problems with right rotator cuff  in the past,seem to be similar to this is  discomfort on the left side.  MRI completed today is consistent with left rotator cuff disruption.  I have contacted Dr. Harley who follows this patient and he will be seeing patient at some point tomorrow morning or later in the afternoon to help plan further treatment of patient's pain and discomfort.  I did consult pain management but we will hold off on interventions with them until Dr. Harley has a chance to look at the patient.  For now patient's pain seems to be  fairly well-controlled.     3.  Dyspnea on exertion.    This issue is probably a function of the patient's severe pain with respect to the left rotator cuff disruption.  Otherwise patient is generally quite healthy and very active.     4.  Hypertension.    There has been some acceleration of patient's blood pressure readings also primarily due to pain.  With pain under better control blood pressure readings seem to be significantly  improved.    Hopefully with help from Dr. Harley will be able to better control the situation.     5.  Gastroesophageal reflux disease on PPI,   Continue treatment of gastroesophageal reflux as in the past with PPI.     6.  Rheumatoid arthritis with chronic changes and currently stable on medications.    Patient does follow-up regularly with rheumatology here in [place].  On medications for treatment of her rheumatoid arthritis with significant improvement in changes in joint disfigurement especially in hands     7.  Hyponatremia.   Patient on IV fluids at time of admission.  Patient seems to be drinking and eating well.  I have discontinued the fluids for now and will continue to follow the sodium level was.     8.  Valvular heart disease with known aortic stenosis.    Patient seems to be under good control at present time with her valvular heart disease.  No additional changes or treatment in this area are currently needed.        Plan for disposition:Where: home and When:  1-2 days    Copied text in this note has been reviewed by me and is accurate as of 07/01/24  Much of this dictation is completed using dragon voice activated software which can result in misspelled words and nonsensical phrases    Niko Cheatham MD  7/1/2024  11:45 EDT

## 2024-07-02 NOTE — PROGRESS NOTES
LOS: 2 days   Patient Care Team:  Niko Cheatham MD as PCP - General (Internal Medicine)  Arcadio Miguel MD as Cardiologist (Cardiology)    Chief Complaint: Follow-up atrial fibrillation with RVR.    Interval History: MRI of the left shoulder showed full length and full width tear of the supraspinatus tendon, possibly the source of her ongoing pain.  Her heart rate has been around 100, and she remains in atrial fibrillation.  She is asymptomatic from the atrial fibrillation with no chest pain or shortness of breath.    Vital Signs:  Temp:  [97.9 °F (36.6 °C)-98.4 °F (36.9 °C)] 97.9 °F (36.6 °C)  Heart Rate:  [] 94  Resp:  [18] 18  BP: ()/(65-79) 93/74    Intake/Output Summary (Last 24 hours) at 7/2/2024 1504  Last data filed at 7/2/2024 0033  Gross per 24 hour   Intake 340 ml   Output 600 ml   Net -260 ml       Physical Exam:   General Appearance:    No acute distress, alert and oriented x4   Lungs:     Clear to auscultation bilaterally     Heart:    Irregularly irregular rhythm with a mildly tachycardic rate.  III/VI SM RUSB.   Abdomen:     Soft, nontender, nondistended.    Extremities:   No clubbing, cyanosis, or edema.     Results Review:    Results from last 7 days   Lab Units 07/02/24  0322   SODIUM mmol/L 131*   POTASSIUM mmol/L 4.2   CHLORIDE mmol/L 99   CO2 mmol/L 24.6   BUN mg/dL 7*   CREATININE mg/dL 0.74   GLUCOSE mg/dL 92   CALCIUM mg/dL 8.9     Results from last 7 days   Lab Units 06/30/24  1020 06/30/24  0624   HSTROP T ng/L 10 11     Results from last 7 days   Lab Units 07/02/24  0322   WBC 10*3/mm3 6.81   HEMOGLOBIN g/dL 12.9   HEMATOCRIT % 38.6   PLATELETS 10*3/mm3 177         Results from last 7 days   Lab Units 06/30/24  1020   CHOLESTEROL mg/dL 124     Results from last 7 days   Lab Units 06/30/24  1020   MAGNESIUM mg/dL 2.2     Results from last 7 days   Lab Units 06/30/24  1020   CHOLESTEROL mg/dL 124   TRIGLYCERIDES mg/dL 54   HDL CHOL mg/dL 78*   LDL CHOL mg/dL 34       I  reviewed the patient's new clinical results.        Assessment:  1.  Upper back pain and left scapular pain - full-thickness and full width tear of the left supraspinatus tendon by MRI  2.  Persistent atrial fibrillation  3.  Nonobstructive coronary artery disease by cath 12/20/2023  4.  Moderate aortic stenosis  5.  Rheumatoid arthritis  6.  Hypertension    Plan:  -Heart rate is better.  I suspect some of this is related to pain.  I gave her a dose of IV digoxin yesterday and increased the digoxin to every day from every other day.  Continue Coreg as blood pressure tolerates.    -Continue Eliquis 2.5 mg twice daily based on her age and body habitus.  If she needs shoulder surgery, this will likely need to be held.    -Aortic stenosis murmur still moderate.     -Cath recently with moderate nonobstructive disease.    -She is clear at moderate cardiac risk for the OR if needed.    Parish Subramanian MD  07/02/24  15:04 EDT

## 2024-07-02 NOTE — PROGRESS NOTES
Chief complaint and reason for consultation: Increased left shoulder pain     is well-known to me for her left shoulder.  I have seen her many times over the years and we have previously done injections for the shoulder.  She has been having off-and-on pain in the back of her scapula for many months now. I did a trigger point injection for her at the last visit that I saw her.  She says it did not help.  I had ordered MRIs of her neck and scapula.  Since admission, she has had MRIs of the shoulder and cervical spine.  She has also gotten a cardiac workup.  All of her current pain is in the back of her shoulder.  She localizes this to just below the tip of the scapula on the left and the lower rib cage.  She denies any shortness of breath or difficulty breathing.  Denies any new weakness, numbness or tingling.    She has overall pretty good neck motion and extremes of flexion extension are a little uncomfortable for her but I was unable to reproduce any shoulder or posterior scapular pain.  Spurling's is uncomfortable but again does not seem to reproduce any shoulder or scapular pain.      Her left shoulder is nontender and her skin looks normal.  She does not have an effusion or increased warmth.  She has a little bit of tenderness just below the tip of her scapula but it is not exquisite.  There is no palpable swelling or mass in this area.  Palpation does not seem to reproduce the severe pain that she is experiencing intermittently.  As far as her shoulder goes, she can raise the arm all the way overhead.  She can abduct and elevate against resistance with good strength and just mild discomfort.  Her shoulder exam seems to be relatively benign.    I reviewed the MRIs of her neck and shoulder.  She has a large rotator cuff tear and advanced glenohumeral osteoarthritis.  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  She has synovitis and inflammation but no evidence for any sort of acute process in the  shoulder.  The MRI of her cervical spine shows multilevel central canal, lateral recess and foraminal stenosis.    Assessment: Persistent left sided posterior scapular pain of suspected cervical etiology    Plan: It is possible that her pain is cervical in etiology.  I suggest we have neurosurgery evaluate her to see if they think that is a possibility.  If so, she may be a candidate for epidurals for both diagnostic and therapeutic purposes.  I did call Dr. Cheatham and left him a message with my recommendations.    Sabino Harley MD

## 2024-07-02 NOTE — CASE MANAGEMENT/SOCIAL WORK
Discharge Planning Assessment  [facility]     Patient Name: Bhumi Gimenez  MRN: [id number]  Today's Date: 7/2/2024    Admit Date: 6/30/2024    Plan: Home, family to transport   Discharge Needs Assessment       Row Name 07/02/24 1321       Living Environment    People in Home alone    Current Living Arrangements condominium    Family Caregiver if Needed child(dayanara), adult;other relative(s)    Quality of Family Relationships helpful;involved;supportive    Able to Return to Prior Arrangements yes       Transition Planning    Patient/Family Anticipates Transition to home    Patient/Family Anticipated Services at Transition     Transportation Anticipated car, drives self;family or friend will provide       Discharge Needs Assessment    Readmission Within the Last 30 Days no previous admission in last 30 days    Equipment Currently Used at Home none    Concerns to be Addressed no discharge needs identified;denies needs/concerns at this time    Equipment Needed After Discharge none                   Discharge Plan       Row Name 07/02/24 1322       Plan    Plan Home, family to transport    Patient/Family in Agreement with Plan yes    Plan Comments CCP met with pt at bedside, introduced self and role of CCP. Face sheet information and pharmacy verified. Pt lives alone in a 3rd floor condo and the condo is a single-story condo. Pt still drives, IADL's and denies DME at home. Pt has a living will. Pt declines meds to beds and denies trouble affording or managing her medications. Pt has used HH in the past and has been to [facility]. DC plan is to return home, family to transport. [place]/CCP                  Continued Care and Services - Admitted Since 6/30/2024    No active coordination exists for this encounter.       Expected Discharge Date and Time       Expected Discharge Date Expected Discharge Time    Jul 4, 2024            Demographic Summary    No documentation.                  Functional  Status       Row Name 07/02/24 1321       Functional Status    Usual Activity Tolerance excellent    Current Activity Tolerance good       Assessment of Health Literacy    How often do you have someone help you read hospital materials? Never    How often do you have problems learning about your medical condition because of difficulty understanding written information? Never    How often do you have a problem understanding what is told to you about your medical condition? Never    How confident are you filling out medical forms by yourself? Extremely    Health Literacy Excellent       Functional Status, IADL    Medications independent    Meal Preparation independent    Housekeeping independent    Laundry independent    Shopping independent                               Yakelin Dunlap RN

## 2024-07-02 NOTE — PROGRESS NOTES
[facility]  INTERNAL MEDICINE  NIKO CHEATHAM MD  [address] [place] [address]  Phone [phone] Fax [phone]  E-mail:  [email or web address]      INTERNAL MEDICINE DAILY PROGRESS NOTE  Niko Cheatham M.D.  2024            Patient Identification:  Name: Bhumi Gimenez  Age: 84 y.o.  Sex: female  :  1940  MRN: [id number]         Primary Care Physician: Niko Cheatham MD  LENGTH OF STAY 2 DAYS    Consults       Date and Time Order Name Status Description    2024 10:25 AM Inpatient Orthopedic Surgery Consult      2024  7:37 AM Cardiology (on-call MD unless specified)      2024  7:19 AM IP General Consult (Use specialty-specific consult if known)              Chief Complaint:   Chest pain and Shortness of breath    History of Present Illness:  Subjective     Interval History: Patient is a 84 y.o.female who presented with  complaints of left back and scapular pain which has been present on and off for the last 6 months.  Her pain has recently been evaluated by Dr. Harley in orthopedics and he had ordered an MRI of her C-spine and scapula which was scheduled for later this week.  He has also done trigger point injections into the paraspinous muscles and has had her go to physical therapy.  All of these efforts have helped somewhat but not completely relieved the discomfort that she is experiencing.  In the last few days the pain is flared up and become much more severe and as a result she came to the emergency room for evaluation.  Pain seems to radiate around to the left side of her chest and seems to originate somewhere in the area of the lower scapula.  Recent pain has indeed been more severe than her pain in the past.  patient was also discovered in the emergency room to have rapid ventricular response with her permanent atrial fibrillation and she was totally unaware that this was going on when she arrived in the  emergency room. Her only real symptom  was dyspnea with exertion that has been worse recently.  The symptoms seem to get worse when she walks up stairs or goes uphill.  Her shortness of breath has recently been a little more severe and was particularly of note when she was in the emergency room with atrial fibrillation and rapid ventricular rate.  Patient did have a cardiac catheterization done last year that showed no significant coronary disease and echocardiogram at that time also showed relatively good function of her heart.  She does have some mild valvular disease and some left atrial enlargement but these have not caused any significant disease for the patient.  Patient's heart rate was actually at 188 in the emergency room on presentation but fortunately her O2 sats were well-maintained and she was not hypotensive.  Her troponins were negative, her chest x-ray was negative, and EKG that was done in the ER showed heart rate down to 144.   labs were remarkable for a low sodium at 132 and a digoxin level that was undetectable.  Patient generally is very active and plays golf on a very regular basis without any particular symptoms until this pain in the thoracic and scapular area is developed.  Pain she is having now is very similar to the pain that she had before she had her right rotator cuff repaired.      7/1/2024.  I personally saw the patient for the first time during this hospitalization on this date in her room on [address].  Patient was resting quietly in bed at the time of my visit and had just finished eating her lunch.  She was sipping on a cup of coffee.  I wore full PPE as indicated including an N95 mask as needed, gloves when touching patient, goggles, and white lab coat.  I performed thorough hand hygiene before and after the patient visit.  Patient does seem a little anxious about the pain that she is experiencing.  She is anxious to get to the bottom of the pain and is wondering if her MRIs  have been ordered.  I did review her chart and did not find orders for the MRI of the cervical spine and the scapula and shoulder which were the test that had been ordered previously by Dr. Harley.  I did order these tests and I contacted Michelle who is the nurse that works with Dr. Harley and she will let him know of the need to see the patient.  When the MRI results came back later in the day showing a significant disruption of the left rotator cuff, I did contact Dr. Harley by text messaging and let him know of the result.  He plans to see the patient during his rounds tomorrow or try to see her between surgical cases.  I did relay this information to the patient who is looking forward to seeing Dr. Harley and working further on her issues tomorrow.    7/2/2024.  Patient seen again in her room on [address].  Patient is resting quietly in bed with ongoing left shoulder pain that is radiating around to her left anterior chest wall.  I did have a chance to talk with Dr. Harley about this patient.  He really believes that even though her left rotator cuff is a mess and needs to be surgically replaced, that   her pain is probably coming from disease of the nerves in her neck.  Therefore he suggests a neurosurgery consult and possibly an epidural by pain management prior to patient's discharge.  I did also discussed with him that I will adjust patient's pain medications to include Norco 5/325 4 times daily.  Patient does want to discontinue the tramadol at present time. patient still appears very tired and her mobility seems to be greatly limited by her current disease and discomfort.  patient does have family support but none of them are present today.  Dr. Subramanian still believes that there is no cardiac cause for the pain she is describing.  Aortic stenosis murmur still appreciated.  Cath showed moderate nonobstructive disease.  He did recommend patient change to Eliquis 2.5 mg daily and if patient needs  shoulder surgery this can be discontinued.  Patient is still planning discharge to home with home health.  Dr. Harley did see the patient in consultation also.  He does believe she has a full-thickness full width tear of the left supraspinatus tendon but he does not believe that the pain she is experiencing in the inferior left scapular area is related to this finding.    He did give the patient a trigger point injection in this area in the past but without much relief.  Patient has multilevel central canal cervical spineModerate patchy deep cerebral white matter FLAIR was seen on MRI.  There is also evidence of chronic small vessel ischemic white matter changes.  There was no evidence of an acute infarction or acute intracranial abnormality. Neurosurgery will be seeing patient in consultation.  We also ask pain management to see the patient.    At this point patient is quite stable medically.    Review of Systems:    A comprehensive 14 point review of systems was negative except for:  Constitution:  positive for fatigue and malaise  Respiratory: positive for  cough, dry, pleuritic pain, and shortness of air  Cardiovascular: positive for  chest pressure / pain, at rest, irregular pulse, orthopnea, and palpitations  Gastrointestinal: positive for  change in bowel habits and early satiety  Genitourinary: postivie for  frequency  Hematologic / Lymphatic: positive for  fatigue  Musculoskeletal: positive for  back pain, joint pain, and muscle weakness  Neurological: positive for  difficulty walking, light headedness, and vertigo    Past Medical History:   Diagnosis Date    Anemia of chronic disorder     Biliary dyskinesia     Chronic deep venous thrombosis     chronic bilateral DVT    Chronic kidney disease     Chronic pancreatitis     ACUTE PANCREATITIS 11/20/2013    Collar bone fracture     Diverticulosis     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    Added automatically from request for surgery [id number]    Esophageal reflux      Full dentures     Hepatomegaly     LIKELY SECONDARY TO ALCOHOL USE    History of alcohol use     quit 11/13    History of transfusion     Hypertension     IBS (irritable bowel syndrome)     with diarrhea    Ileus, unspecified 05/14/2021    Left foot drop     LEFT FOOD DROP SECONDARY TO PERONEAL NERVE INJURY    Nonrheumatic aortic valve stenosis 08/11/2023    Osteoarthritis     OSTEO    Pancreatic pseudocyst resolved on CT 5/13/21 12/05/2016    Peripheral neuropathy     LEFT LOWER EXTREMITY    Permanent atrial fibrillation 08/11/2023    Personal history of gallstones     Personal history of malignant neoplasm of skin     S/P REMOVAL FROM LEFT KNEE    Primary osteoarthritis of both hands     Rheumatoid arthritis of multiple sites without organ or system involvement with positive rheumatoid factor 05/12/2021    Takotsubo syndrome     Likely secondary to acute illness in 11/13.  EF as low as 20-25% 11/27/13.  EF 63% by echo 4/14/14.    Thrombocytopenia      Past Surgical History:   Procedure Laterality Date    CARDIAC CATHETERIZATION N/A 12/20/2023    Procedure: Coronary angiography;  Surgeon: Myrna Ulloa MD;  Location: [facility] CATH INVASIVE LOCATION;  Service: Cardiovascular;  Laterality: N/A;    CHOLECYSTECTOMY      COLONOSCOPY N/A 09/08/2022    Procedure: COLONOSCOPY to cecum;  Surgeon: Jhonatan Casey Jr., MD;  Location: [facility] ENDOSCOPY;  Service: General;  Laterality: N/A;  pre: screening  post: normal    CORNEAL TRANSPLANT      ENDOSCOPY N/A 09/08/2022    Procedure: ESOPHAGOGASTRODUODENOSCOPY with cold biopsies;  Surgeon: Jhonatan Casey Jr., MD;  Location: [facility] ENDOSCOPY;  Service: General;  Laterality: N/A;  pre: RUQ abdominal pain  post: esophagitis and mild gastritis    MULTIPLE TOOTH EXTRACTIONS      FULL MOUTH TEETH REMOVED    TOTAL SHOULDER ARTHROPLASTY W/ DISTAL CLAVICLE EXCISION Right 11/16/2017    Procedure: Reverse Total Shoulder Arthroplasty;  Surgeon: Sabino Harley MD;  Location: [facility]  "MAIN OR;  Service:      No Known Allergies    Family History   Problem Relation Age of Onset    Alcohol abuse Mother     Other Mother         CORONARY ARTERIOSCLEROSIS    Cancer Father         30 years ago    Malig Hyperthermia Neg Hx        Social History     Socioeconomic History    Marital status: Single   Tobacco Use    Smoking status: Never     Passive exposure: Never    Smokeless tobacco: Never   Vaping Use    Vaping status: Never Used   Substance and Sexual Activity    Alcohol use: No    Drug use: No    Sexual activity: Not Currently       PMH, FH, SH and ROS completed with Admission History and Physical and updated in EPIC system.        Objective     Scheduled Meds:amLODIPine, 2.5 mg, Oral, Daily  apixaban, 2.5 mg, Oral, Q12H  atorvastatin, 10 mg, Oral, Daily  carvedilol, 25 mg, Oral, BID With Meals  digoxin, 125 mcg, Oral, Daily  ferrous sulfate, 325 mg, Oral, Daily With Breakfast  fluorometholone, 1 drop, Both Eyes, BID  folic acid, 1,000 mcg, Oral, Daily  furosemide, 20 mg, Oral, Daily  Lidocaine, 1 patch, Transdermal, Q24H  pantoprazole, 40 mg, Oral, Q AM  potassium chloride, 10 mEq, Oral, Daily  sodium chloride, 500 mL, Intravenous, Once  sodium chloride, 10 mL, Intravenous, Q12H      Continuous Infusions:     Vital signs in last 24 hours:  Temp:  [97.9 °F (36.6 °C)-98.4 °F (36.9 °C)] 97.9 °F (36.6 °C)  Heart Rate:  [] 98  Resp:  [18] 18  BP: ()/(64-79) 96/65    Intake/Output:    Intake/Output Summary (Last 24 hours) at 7/2/2024 1811  Last data filed at 7/2/2024 0033  Gross per 24 hour   Intake 340 ml   Output 600 ml   Net -260 ml       Exam:  BP 96/65 (BP Location: Left arm, Patient Position: Sitting)   Pulse 98   Temp 97.9 °F (36.6 °C) (Oral)   Resp 18   Ht 160 cm (63\")   Wt 52.2 kg (115 lb)   SpO2 98%   BMI 20.37 kg/m²     Constitutional:  Alert, cooperative, no distress, AAOx3, resting comfortably   Head:      Normocephalic, without obvious abnormality, atraumatic   Eyes:   "   PERRLA, conjunctiva/corneas clear, no icterus, no conjunctival                                     pallor, EOM's intact, both eyes      ENT and Mouth: Lips, tongue, gums normal; oral mucosa pink and moist   Neck:     Supple, symmetrical, trachea midline, no JVD  Respiratory:     Clear to auscultation bilaterally, respirations unlabored  Cardiovascular:  Regular rate and rhythm, S1 and S2 normal, no murmur,      no  Rub or gallop.  Pulses normal.    Gastrointestinal:   BS present x 4 Soft, non-tender, bowel sounds active,      no masses, no hepatosplenomegaly                                                     :       No hernia.  Normal exam for sex.         Musculoskeletal: Extremities normal, atraumatic, no cyanosis or edema     No arthropathy.  No deformity.  Gait normal                                                 Skin:   Skin is warm and dry,  no rashes, swelling or palpable lesions   Neurologic:  CN -XII intact, motor strength grossly intact, sensation grossly intact to light touch, no focal reflex deficits noted    Psychiatric:     Alert,oriented X3, no delusions, psychoses, depression or anxiety    Heme/Lymph/Imun:   No bruises, petechiae.  Lymph nodes normal in size/configuration       Data Review:  Lab Results   Component Value Date    CALCIUM 8.9 07/02/2024    PHOS 3.6 05/12/2021     Results from last 7 days   Lab Units 07/02/24  0322 07/01/24  0454 06/30/24  1418 06/30/24  1020 06/30/24  0624   AST (SGOT) U/L  --   --   --   --  21   ALT (SGPT) U/L  --   --   --   --  18   MAGNESIUM mg/dL  --   --   --  2.2  --    SODIUM mmol/L 131* 136 134*  --  132*   POTASSIUM mmol/L 4.2 4.3 4.0  --  4.0   CHLORIDE mmol/L 99 103 101  --  97*   CO2 mmol/L 24.6 26.0 20.4*  --  24.0   BUN mg/dL 7* 9 7*  --  7*   CREATININE mg/dL 0.74 0.87 0.80  --  0.79   GLUCOSE mg/dL 92 96 135*  --  104*   CALCIUM mg/dL 8.9 8.9 9.3  --  9.5   WBC 10*3/mm3 6.81 7.09 8.98  --  7.99   HEMOGLOBIN g/dL 12.9 13.2 13.9  --  13.5    PLATELETS 10*3/mm3 177 186 221  --  190     Lab Results   Component Value Date    TROPONINT 10 06/30/2024     Estimated Creatinine Clearance: 45.8 mL/min (by C-G formula based on SCr of 0.74 mg/dL).  WEIGHTS:     Wt Readings from Last 1 Encounters:   06/30/24 0642 52.2 kg (115 lb)         Assessment:    Atrial fibrillation with RVR    Rheumatoid arthritis involving multiple sites    Nonrheumatic aortic valve stenosis    Shoulder blade pain    Traumatic incomplete tear of left rotator cuff    Left foot drop    Chronic deep venous thrombosis    Essential hypertension    Irritable bowel syndrome with diarrhea    Glucose intolerance (impaired glucose tolerance) stress associated    Neuropathy of left lower extremity    Duodenal diverticulum    Dyspnea on exertion    Takotsubo cardiomyopathy    GERD (gastroesophageal reflux disease)    Hyponatremia    Hammer toe    Primary osteoarthritis of both hands      Attending Physician Assessment and Plan:     1.  Permanent atrial fibrillation with rapid ventricular rate at time of presentation in the emergency room. Cardiology has adjusted patient's medications and heart rate is under much better control at present time.  Digoxin has been reintroduced and dose has been corrected by cardiology to try to keep things under better control.  Patient breathing better now that the permanent atrial fibrillation is under better rate control.  Cardiology continuing to follow the patient for now.  Digoxin level has been increased.      2.  Left thoracic/scapular chest wall pain with cardiac cause ruled out and workup revealing disruption of left rotator cuff.   Problems with right rotator cuff  in the past,seem to be similar to this is  discomfort on the left side.  MRI completed today is consistent with left rotator cuff disruption.  I have contacted Dr. Harley who follows this patient and he will be seeing patient at some point tomorrow morning or later in the afternoon to help plan  further treatment of patient's pain and discomfort.  I did consult pain management but we will hold off on interventions with them until Dr. Harley has a chance to look at the patient.  For now patient's pain seems to be fairly well-controlled.     3.  Dyspnea on exertion.    This issue is probably a function of the patient's severe pain with respect to the left rotator cuff disruption.  Otherwise patient is generally quite healthy and very active.     4.  Hypertension.    There has been some acceleration of patient's blood pressure readings also primarily due to pain.  With pain under better control blood pressure readings seem to be significantly  improved.    Hopefully with help from Dr. Harley will be able to better control the situation.     5.  Gastroesophageal reflux disease on PPI,   Continue treatment of gastroesophageal reflux as in the past with PPI.     6.  Rheumatoid arthritis with chronic changes and currently stable on medications.    Patient does follow-up regularly with rheumatology here in [place].  On medications for treatment of her rheumatoid arthritis with significant improvement in changes in joint disfigurement especially in hands     7.  Hyponatremia.   Patient on IV fluids at time of admission.  Patient seems to be drinking and eating well.  I have discontinued the fluids for now and will continue to follow the sodium level was.     8.  Valvular heart disease with known aortic stenosis.    Patient seems to be under good control at present time with her valvular heart disease.  No additional changes or treatment in this area are currently needed.        Plan for disposition:Where: home and When:  1-2 days    Copied text in this note has been reviewed by me and is accurate as of 07/02/24  Much of this dictation is completed using dragon voice activated software which can result in misspelled words and nonsensical phrases    Niko Cheatham MD  7/2/2024  1245 EDT

## 2024-07-03 ENCOUNTER — READMISSION MANAGEMENT (OUTPATIENT)
Dept: CALL CENTER | Facility: HOSPITAL | Age: 84
End: 2024-07-03
Payer: MEDICARE

## 2024-07-03 VITALS
HEART RATE: 95 BPM | TEMPERATURE: 98.8 F | SYSTOLIC BLOOD PRESSURE: 95 MMHG | RESPIRATION RATE: 18 BRPM | BODY MASS INDEX: 20.38 KG/M2 | DIASTOLIC BLOOD PRESSURE: 56 MMHG | OXYGEN SATURATION: 90 % | WEIGHT: 115 LBS | HEIGHT: 63 IN

## 2024-07-03 DIAGNOSIS — M89.8X1 CHRONIC SCAPULAR PAIN: Primary | ICD-10-CM

## 2024-07-03 DIAGNOSIS — M50.30 DDD (DEGENERATIVE DISC DISEASE), CERVICAL: ICD-10-CM

## 2024-07-03 DIAGNOSIS — G89.29 CHRONIC SCAPULAR PAIN: Primary | ICD-10-CM

## 2024-07-03 LAB
ANION GAP SERPL CALCULATED.3IONS-SCNC: 7 MMOL/L (ref 5–15)
BASOPHILS # BLD AUTO: 0.02 10*3/MM3 (ref 0–0.2)
BASOPHILS NFR BLD AUTO: 0.2 % (ref 0–1.5)
BUN SERPL-MCNC: 9 MG/DL (ref 8–23)
BUN/CREAT SERPL: 13.6 (ref 7–25)
CALCIUM SPEC-SCNC: 8.9 MG/DL (ref 8.6–10.5)
CHLORIDE SERPL-SCNC: 101 MMOL/L (ref 98–107)
CO2 SERPL-SCNC: 26 MMOL/L (ref 22–29)
CREAT SERPL-MCNC: 0.66 MG/DL (ref 0.57–1)
DEPRECATED RDW RBC AUTO: 46 FL (ref 37–54)
DIGOXIN SERPL-MCNC: 0.8 NG/ML (ref 0.6–1.2)
EGFRCR SERPLBLD CKD-EPI 2021: 86.1 ML/MIN/1.73
EOSINOPHIL # BLD AUTO: 0.18 10*3/MM3 (ref 0–0.4)
EOSINOPHIL NFR BLD AUTO: 2.1 % (ref 0.3–6.2)
ERYTHROCYTE [DISTWIDTH] IN BLOOD BY AUTOMATED COUNT: 13 % (ref 12.3–15.4)
GLUCOSE SERPL-MCNC: 89 MG/DL (ref 65–99)
HCT VFR BLD AUTO: 38.6 % (ref 34–46.6)
HGB BLD-MCNC: 13 G/DL (ref 12–15.9)
IMM GRANULOCYTES # BLD AUTO: 0.04 10*3/MM3 (ref 0–0.05)
IMM GRANULOCYTES NFR BLD AUTO: 0.5 % (ref 0–0.5)
LYMPHOCYTES # BLD AUTO: 1.09 10*3/MM3 (ref 0.7–3.1)
LYMPHOCYTES NFR BLD AUTO: 12.9 % (ref 19.6–45.3)
MCH RBC QN AUTO: 33.4 PG (ref 26.6–33)
MCHC RBC AUTO-ENTMCNC: 33.7 G/DL (ref 31.5–35.7)
MCV RBC AUTO: 99.2 FL (ref 79–97)
MONOCYTES # BLD AUTO: 0.96 10*3/MM3 (ref 0.1–0.9)
MONOCYTES NFR BLD AUTO: 11.3 % (ref 5–12)
NEUTROPHILS NFR BLD AUTO: 6.17 10*3/MM3 (ref 1.7–7)
NEUTROPHILS NFR BLD AUTO: 73 % (ref 42.7–76)
NRBC BLD AUTO-RTO: 0 /100 WBC (ref 0–0.2)
PLATELET # BLD AUTO: 188 10*3/MM3 (ref 140–450)
PMV BLD AUTO: 9.5 FL (ref 6–12)
POTASSIUM SERPL-SCNC: 4.2 MMOL/L (ref 3.5–5.2)
QT INTERVAL: 319 MS
QTC INTERVAL: 397 MS
RBC # BLD AUTO: 3.89 10*6/MM3 (ref 3.77–5.28)
SODIUM SERPL-SCNC: 134 MMOL/L (ref 136–145)
WBC NRBC COR # BLD AUTO: 8.46 10*3/MM3 (ref 3.4–10.8)

## 2024-07-03 PROCEDURE — 93005 ELECTROCARDIOGRAM TRACING: CPT | Performed by: INTERNAL MEDICINE

## 2024-07-03 PROCEDURE — 80162 ASSAY OF DIGOXIN TOTAL: CPT | Performed by: INTERNAL MEDICINE

## 2024-07-03 PROCEDURE — 80048 BASIC METABOLIC PNL TOTAL CA: CPT | Performed by: INTERNAL MEDICINE

## 2024-07-03 PROCEDURE — 93010 ELECTROCARDIOGRAM REPORT: CPT | Performed by: INTERNAL MEDICINE

## 2024-07-03 PROCEDURE — 99232 SBSQ HOSP IP/OBS MODERATE 35: CPT | Performed by: NURSE PRACTITIONER

## 2024-07-03 PROCEDURE — 85025 COMPLETE CBC W/AUTO DIFF WBC: CPT | Performed by: INTERNAL MEDICINE

## 2024-07-03 RX ORDER — FERROUS SULFATE 325(65) MG
325 TABLET ORAL
Qty: 30 TABLET | Refills: 0 | Status: SHIPPED | OUTPATIENT
Start: 2024-07-04

## 2024-07-03 RX ORDER — FUROSEMIDE 20 MG/1
20 TABLET ORAL DAILY
Qty: 30 TABLET | Refills: 0 | Status: SHIPPED | OUTPATIENT
Start: 2024-07-04

## 2024-07-03 RX ORDER — HYDROCODONE BITARTRATE AND ACETAMINOPHEN 5; 325 MG/1; MG/1
1 TABLET ORAL EVERY 4 HOURS PRN
Qty: 18 TABLET | Refills: 0 | Status: SHIPPED | OUTPATIENT
Start: 2024-07-03 | End: 2024-07-06

## 2024-07-03 RX ORDER — CYCLOBENZAPRINE HCL 10 MG
10 TABLET ORAL EVERY 8 HOURS PRN
Qty: 60 TABLET | Refills: 1 | Status: SHIPPED | OUTPATIENT
Start: 2024-07-03

## 2024-07-03 RX ORDER — DIGOXIN 125 MCG
125 TABLET ORAL DAILY
Qty: 30 TABLET | Refills: 0 | Status: SHIPPED | OUTPATIENT
Start: 2024-07-04

## 2024-07-03 RX ADMIN — CARVEDILOL 25 MG: 25 TABLET, FILM COATED ORAL at 08:17

## 2024-07-03 RX ADMIN — FUROSEMIDE 20 MG: 20 TABLET ORAL at 08:17

## 2024-07-03 RX ADMIN — FOLIC ACID 1000 MCG: 1 TABLET ORAL at 08:17

## 2024-07-03 RX ADMIN — AMLODIPINE BESYLATE 2.5 MG: 5 TABLET ORAL at 08:17

## 2024-07-03 RX ADMIN — FERROUS SULFATE TAB 325 MG (65 MG ELEMENTAL FE) 325 MG: 325 (65 FE) TAB at 08:17

## 2024-07-03 RX ADMIN — DIGOXIN 125 MCG: 125 TABLET ORAL at 08:17

## 2024-07-03 RX ADMIN — Medication 10 ML: at 08:17

## 2024-07-03 RX ADMIN — HYDROCODONE BITARTRATE AND ACETAMINOPHEN 1 TABLET: 5; 325 TABLET ORAL at 07:27

## 2024-07-03 RX ADMIN — HYDROCODONE BITARTRATE AND ACETAMINOPHEN 1 TABLET: 5; 325 TABLET ORAL at 14:56

## 2024-07-03 RX ADMIN — FLUOROMETHOLONE 1 DROP: 1 SOLUTION/ DROPS OPHTHALMIC at 08:17

## 2024-07-03 RX ADMIN — CARVEDILOL 25 MG: 25 TABLET, FILM COATED ORAL at 17:17

## 2024-07-03 RX ADMIN — POTASSIUM CHLORIDE 10 MEQ: 750 TABLET, EXTENDED RELEASE ORAL at 08:17

## 2024-07-03 RX ADMIN — ATORVASTATIN CALCIUM 10 MG: 20 TABLET, FILM COATED ORAL at 08:17

## 2024-07-03 RX ADMIN — PANTOPRAZOLE SODIUM 40 MG: 40 TABLET, DELAYED RELEASE ORAL at 06:26

## 2024-07-03 RX ADMIN — SENNOSIDES AND DOCUSATE SODIUM 2 TABLET: 50; 8.6 TABLET ORAL at 08:27

## 2024-07-03 RX ADMIN — APIXABAN 2.5 MG: 2.5 TABLET, FILM COATED ORAL at 08:17

## 2024-07-03 RX ADMIN — LIDOCAINE 1 PATCH: 4 PATCH TOPICAL at 08:17

## 2024-07-03 NOTE — CONSULTS
Per RACH guidelines, eliquis would need to be held x 75 hours prior to cervical epidural injection. Pain management will await further instruction after neurosurgery consult. ESTELLE can be scheduled as inpatient or outpatient.

## 2024-07-03 NOTE — PROGRESS NOTES
Hospital Follow Up    LOS:  LOS: 3 days   Patient Name: Bhumi Gimenez  Age/Sex: 84 y.o. female  : 1940  MRN: [id number]    Day of Service: 24   Length of Stay: 3  Encounter Provider: ANTONY Sadler  Place of Service: [facility] CARDIOLOGY  Patient Care Team:  Niko Cheatham MD as PCP - General (Internal Medicine)  Arcadio Miguel MD as Cardiologist (Cardiology)    Subjective:     Chief Complaint: follow up afib    Interval History: No complaints of chest pain or shortness of breath. Pain a little improved in shoulder.    Objective:     Objective:  Temp:  [97.7 °F (36.5 °C)-98.2 °F (36.8 °C)] 97.7 °F (36.5 °C)  Heart Rate:  [] 105  Resp:  [18] 18  BP: ()/(64-84) 135/84     Intake/Output Summary (Last 24 hours) at 7/3/2024 1223  Last data filed at 2024 2153  Gross per 24 hour   Intake 220 ml   Output --   Net 220 ml     Body mass index is 20.37 kg/m².      24  0642   Weight: 52.2 kg (115 lb)     Weight change:     Physical Exam:   General Appearance:    Awake alert and oriented in no acute distress.   Color:  Skin:  Neuro:  HEENT:    Lungs:     Pink  Warm and dry  No focal, motor or sensory deficits  Neck supple, pupils equal, round and reactive. No JVD, No Bruit  Clear to auscultation,respirations regular, even and                  unlabored    Heart:    Irr/Irr, S1 and S2, 2/6 murmur, no gallop, no rub. No edema, DP/PT pulses are 2+   Chest Wall:    No abnormalities observed   Abdomen:     Normal bowel sounds, no masses, no organomegaly, soft        non-tender, non-distended, no guarding, no ascites noted   Extremities:   Moves all extremities well, no edema, no cyanosis, no redness       Lab Review:   Results from last 7 days   Lab Units 24  0433 24  0322 24  1418 24  0624   SODIUM mmol/L 134* 131*   < > 132*   POTASSIUM mmol/L 4.2 4.2   < > 4.0   CHLORIDE mmol/L 101 99   < > 97*   CO2 mmol/L 26.0 24.6   < > 24.0    BUN mg/dL 9 7*   < > 7*   CREATININE mg/dL 0.66 0.74   < > 0.79   GLUCOSE mg/dL 89 92   < > 104*   CALCIUM mg/dL 8.9 8.9   < > 9.5   AST (SGOT) U/L  --   --   --  21   ALT (SGPT) U/L  --   --   --  18    < > = values in this interval not displayed.     Results from last 7 days   Lab Units 06/30/24  1020 06/30/24  0624   HSTROP T ng/L 10 11     Results from last 7 days   Lab Units 07/03/24  0433 07/02/24  0322   WBC 10*3/mm3 8.46 6.81   HEMOGLOBIN g/dL 13.0 12.9   HEMATOCRIT % 38.6 38.6   PLATELETS 10*3/mm3 188 177         Results from last 7 days   Lab Units 06/30/24  1020   MAGNESIUM mg/dL 2.2     Results from last 7 days   Lab Units 06/30/24  1020   CHOLESTEROL mg/dL 124   TRIGLYCERIDES mg/dL 54   HDL CHOL mg/dL 78*     Results from last 7 days   Lab Units 06/30/24  0624   PROBNP pg/mL 1,558.0         I reviewed the patient's new clinical results.  I personally viewed and interpreted the patient's EKG  Current Medications:   Scheduled Meds:amLODIPine, 2.5 mg, Oral, Daily  apixaban, 2.5 mg, Oral, Q12H  atorvastatin, 10 mg, Oral, Daily  carvedilol, 25 mg, Oral, BID With Meals  digoxin, 125 mcg, Oral, Daily  ferrous sulfate, 325 mg, Oral, Daily With Breakfast  fluorometholone, 1 drop, Both Eyes, BID  folic acid, 1,000 mcg, Oral, Daily  furosemide, 20 mg, Oral, Daily  Lidocaine, 1 patch, Transdermal, Q24H  pantoprazole, 40 mg, Oral, Q AM  potassium chloride, 10 mEq, Oral, Daily  sodium chloride, 10 mL, Intravenous, Q12H      Continuous Infusions:     Allergies:  No Known Allergies    Assessment:       Atrial fibrillation with RVR    Left foot drop    Chronic deep venous thrombosis    Essential hypertension    Hammer toe    Irritable bowel syndrome with diarrhea    Glucose intolerance (impaired glucose tolerance) stress associated    Neuropathy of left lower extremity    Primary osteoarthritis of both hands    Rheumatoid arthritis involving multiple sites    Duodenal diverticulum    Nonrheumatic aortic valve  stenosis    Shoulder blade pain    Dyspnea on exertion    Takotsubo cardiomyopathy    GERD (gastroesophageal reflux disease)    Hyponatremia    Traumatic incomplete tear of left rotator cuff        Plan:   1.  Upper back pain and left scapular pain - full-thickness and full width tear of the left supraspinatus tendon by MRI  2.  Persistent atrial fibrillation  3.  Nonobstructive coronary artery disease by cath 12/20/2023  4.  Moderate aortic stenosis  5.  Rheumatoid arthritis  6.  Hypertension      Heart rate is remaining in the 90s-low 100s. Mild elevation slightly secondary to pain  On apixaban for anticoagulation  Possibly home later today-ok from a cardiac standpoint.    ANTONY Sadler  07/03/24  12:23 EDT  Electronically signed by ANTONY Sadler, 07/03/24, 12:23 PM EDT.
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
## 2024-07-03 NOTE — PROGRESS NOTES
"Nutrition Services    Patient Name:  Bhumi Gimenez  YOB: 1940  MRN: [id number]  Admit Date:  6/30/2024    Assessment Date:  07/03/24    NUTRITION SCREENING      Reason for Encounter MST score 2+, \"Unsure\" unintentional weight loss   Diagnosis/Problem Chest pain and SOB. Hx of IBS, skin cancer, PAF, HTN, hx of ileus, diverticulosis, CKD,        PO Diet Diet: Cardiac; Healthy Heart (2-3 Na+); Fluid Consistency: Thin (IDDSI 0)   Supplements    PO Intake % Pt reported a usual intake of % of 2 meals with a steady appetite        Medications MAR reviewed by RD   Labs  Listed below, reviewed   Physical Findings No signs of muscle wasting or fat loss   GI Function WDL, Last BM 6/30   Skin Status WDL       Height  Weight  BMI  Weight Trend     Height: 160 cm (63\")  Weight: 52.2 kg (115 lb) (06/30/24 0642)  Body mass index is 20.37 kg/m².  Gain       Nutrition Problem (PES) No nutrition diagnosis at this time.       Intervention/Plan RD to follow up per protocol.     Results from last 7 days   Lab Units 07/03/24  0433 07/02/24  0322 07/01/24  0454 06/30/24  1418 06/30/24  0624   SODIUM mmol/L 134* 131* 136   < > 132*   POTASSIUM mmol/L 4.2 4.2 4.3   < > 4.0   CHLORIDE mmol/L 101 99 103   < > 97*   CO2 mmol/L 26.0 24.6 26.0   < > 24.0   BUN mg/dL 9 7* 9   < > 7*   CREATININE mg/dL 0.66 0.74 0.87   < > 0.79   CALCIUM mg/dL 8.9 8.9 8.9   < > 9.5   BILIRUBIN mg/dL  --   --   --   --  0.6   ALK PHOS U/L  --   --   --   --  113   ALT (SGPT) U/L  --   --   --   --  18   AST (SGOT) U/L  --   --   --   --  21   GLUCOSE mg/dL 89 92 96   < > 104*    < > = values in this interval not displayed.     Results from last 7 days   Lab Units 07/03/24  0433 06/30/24  1418 06/30/24  1020   MAGNESIUM mg/dL  --   --  2.2   HEMOGLOBIN g/dL 13.0   < >  --    HEMATOCRIT % 38.6   < >  --    TRIGLYCERIDES mg/dL  --   --  54    < > = values in this interval not displayed.     Lab Results   Component Value Date    HGBA1C 5.60 " 05/13/2021         Electronically signed by:  Christopher Espinosa  07/03/24 09:21 EDT

## 2024-07-03 NOTE — PLAN OF CARE
Alert, vss. Room air. Pain treated with prn [place]. Neuro Sx consult. Afib per monitor.       Problem: Adult Inpatient Plan of Care  Goal: Absence of Hospital-Acquired Illness or Injury  Intervention: Prevent Skin Injury  Recent Flowsheet Documentation  Taken 7/3/2024 0202 by Jessica Villalta RN  Body Position: head facing, left  Taken 7/3/2024 0005 by Jessica Villalta RN  Skin Protection: adhesive use limited  Taken 7/2/2024 2202 by Jessica Villalta RN  Body Position: sitting up in bed  Taken 7/2/2024 2006 by Jessica Villalta RN  Body Position:   position changed independently   sitting up in bed  Skin Protection: adhesive use limited  Taken 7/2/2024 1959 by Jessica Villalta RN  Body Position: sitting up in bed   Goal Outcome Evaluation:

## 2024-07-03 NOTE — PLAN OF CARE
Goal Outcome Evaluation:           Progress: improving  Outcome Evaluation: discharge home with plans for cervical epidural next week.

## 2024-07-03 NOTE — DISCHARGE SUMMARY
BYRON PATINO [facility]  INTERNAL MEDICINE  LEOLA WILLIAM MD  [address] [place] [address]  Phone [phone] Fax [phone]  E-mail:  [email or web address]    [facility]   DISCHARGE SUMMARY  LEOLA WILLIAM MD      Date of Discharge:  7/3/2024    Discharge Diagnosis:       Chronic scapular pain    Cervical spinal cord compression C4 to C7 by MRI 4/1/2024    Cervical disc disorder with radiculopathy in cervicothoracic region    Cervical stenosis of spinal cord and lateral recesses     Atrial fibrillation with RVR    Traumatic incomplete tear of left rotator cuff    Rheumatoid arthritis involving multiple sites    Nonrheumatic aortic valve stenosis    Left foot drop    Chronic deep venous thrombosis    Essential hypertension    Irritable bowel syndrome with diarrhea    Glucose intolerance (impaired glucose tolerance) stress associated    Neuropathy of left lower extremity    Takotsubo cardiomyopathy    GERD (gastroesophageal reflux disease)    Hyponatremia    Hammer toe    Primary osteoarthritis of both hands    Procedures Performed    1.  Cardiac enzymes totally within normal limits including troponin and proBNP  2.  Sodium ranging from a low of 131 to a high of 136 and back down to 134 prior to discharge  3.  CO2 20.4 on admission but up to 26.0 at discharge  4.  BUN 7 on admission and up to 9 prior to discharge  5.  Normal creatinine at 0.66  6.  Estimated GFR ranging from low of 65.4 up to 86.1 prior to discharge  7.  Lipid profile showing cholesterol 124, HDL 78, LDL 34, triglycerides 54, and LDL/HDL ratio of 0.45  8.  Lipase normal at 36  9.  White blood cell count ranging from 8.98 on admission down to 8.46 prior to discharge  10.  Hemoglobin ranging from 13.9 on admission down to 13.0 at discharge  11.  Digoxin level ranging from <0.3 on admission up to 0.8 prior to discharge  12.  EKG showing atrial fibrillation heart rate 93 left ventricular  hypertrophy no changes from the past.  13.  Dilaudid 0.25 used initially for control of severe pain in chest and scapular area  14.  Patient transition to low-dose hydrocodone 5 mg for continued pain control at home with plans to follow-up in office for further pain management and to be referred to pain management.  Urine drug screen done Dr. Acevedo could possibly be some  15. BHUMIKA report reviewed during hospital stay in ER and by myself on floor.       Procedures  Imaging Results (All)       Procedure Component Value Units Date/Time    MRI Cervical Spine Without Contrast [[id number]] Collected: 07/01/24 2020     Updated: 07/01/24 2020    Narrative:        Patient: JOHN CARDENAS  Time Out: 17:41  Exam(s): MRI C SPINE Without Contrast     EXAM:    MR Cervical Spine Without Intravenous Contrast    CLINICAL HISTORY:    pt sts chronic neck pain; tingling in left hand no trauma; no sx in   neck pt just had a nerve block 5 weeks ago     TECHNIQUE:    Magnetic resonance images of the cervical spine without intravenous   contrast in multiple planes.    COMPARISON:  Cervical spine CT dated 03 05 2024.    FINDINGS:    Vertebrae:  Unremarkable.  No acute fracture.    Spinal cord:   Mild cord flattening at C4-5 C5-6, C6-7 due to disc   disease.  No cord signal abnormalities.  .      Soft tissues:  Unremarkable.     DISCS SPINAL CANAL NEURAL FORAMINA:    C2-C3:  Unremarkable.  No significant disc disease.  No stenosis.    C3-C4:  Posterior disc bulge extending 3 mm.  Associated mild spinal   canal stenosis.  Mild to moderate left-sided lateral recess and foraminal   stenosis.    C4-C5:  Posterior disc bulge extending 3 mm.  Associated mild spinal   canal stenosis.  Mild bilateral lateral recess and foraminal stenosis.    C5-C6:  Posterior disc bulge extending 3.5 mm.  Associated mild spinal   canal stenosis.  Moderate stenosis of the right lateral recess and right   neural foramen.    C6-C7:  Posterior disc bulge extending  3 mm.  Associated mild spinal   canal stenosis.    C7-T1:  Posterior disc bulge extending 2 mm.  Associated mild spinal   canal stenosis.    IMPRESSION:       1.  Mild cord flattening at C4-5 C5-6, C6-7 due to disc disease.  No cord   signal abnormalities.  2.  Posterior disc bulges at C3-4, C4-5, C5-6, C6-7, and C7-T1.  3.  Multilevel spinal canal, lateral recess, and foraminal stenosis as   described above.    Impression:          Electronically signed by Andi Bautista MD on 07-01-24 at 1741    MRI Shoulder Left Without Contrast [[id number]] Collected: 07/01/24 1553     Updated: 07/01/24 1608    Narrative:      MRI SHOULDER LEFT WO CONTRAST-     Date of Exam: 7/1/2024 3:01 PM     Indication: Severe pain at the base of the left scapula.     Comparison: Chest radiograph 6/30/2024. Left shoulder radiographs  3/5/2024. CT chest 3/5/2024.     Technique: Multiplanar, multisequence MR imaging of the shoulder was  performed without contrast.     FINDINGS:  Motion artifact mildly limits evaluation.     Rotator cuff: Full-thickness, near full width tear of the supraspinatus  tendon from the footprint with up to 4.5 cm medial retraction of the  bulk of the tendon fibers and only a few thin far anterior tendon fibers  potentially remaining intact on the greater tuberosity. Mild to moderate  infraspinatus tendinopathy with mild partial-thickness articular sided  fraying anteriorly at the footprint. The subscapularis and teres minor  tendons are intact. Mild (grade 1/4) diffuse muscular fatty atrophy. No  myositis.     Biceps tendon: The intra-articular long head biceps tendon is not  definitively identified, which could reflect full-thickness or  high-grade near full-thickness tear and retraction to the bicipital  groove.     Acromioclavicular Arch: Mild age-appropriate hypertrophic degenerative  changes at the acromioclavicular joint. Type II acromion, without  lateral downsloping. Abnormal fluid in the  subacromial/subdeltoid bursa  communicating through the full-thickness rotator cuff tear.     Glenohumeral joint: Superior subluxation of the humeral head in relation  to the glenoid, related to the rotator cuff pathology. Small to moderate  joint effusion. Diffuse synovial thickening and intra-articular debris.  Severe chondromalacia with complete or near complete chondral loss from  the humeral head and glenoid. The joint capsule is within normal limits.     Labrum: Degenerative signal and morphology of the superior labrum. No  paralabral cyst.     Bone: Likely chronic comminuted mildly displaced ununited fracture of  the distal clavicle. Nonspecific subcortical cystic changes in the  greater and lesser tuberosities. No acute fracture. No concerning bone  marrow lesion or marrow replacing process.     Soft tissues: No soft tissue mass.       Impression:         1. Full-thickness, near full width tear of the supraspinatus tendon from  the footprint with medial retraction of the bulk of the tendon fibers  and only a few thin far anterior tendon fibers potentially remaining  intact on the greater tuberosity.  2. Mild to moderate infraspinatus tendinopathy with mild  partial-thickness articular sided fraying anteriorly at the footprint.  3. The intra-articular long head biceps tendon is not definitively  identified, suggesting full-thickness or high-grade near full-thickness  tear and retraction to the bicipital groove.  4. Severe glenohumeral joint chondromalacia with a small to moderate  joint effusion and diffuse synovial thickening and intra-articular  debris.  5. Old ununited comminuted mildly displaced fracture of the distal  clavicle with mild age-appropriate DJD at the AC joint.              This report was finalized on 7/1/2024 4:05 PM by Keegan Gomez MD on  Workstation: [id number]       XR Chest 1 View [[id number]] Collected: 06/30/24 0636     Updated: 06/30/24 0641    Narrative:      XR CHEST 1 VW-      HISTORY: Female who is 84 years-old, chest pain     TECHNIQUE: Frontal view of the chest     COMPARISON: None available     FINDINGS: The heart size is borderline. Pulmonary vasculature is  unremarkable. No focal pulmonary consolidation, pleural effusion, or  pneumothorax. Old granulomatous disease is present. Chronic appearing  deformity of the distal left clavicle is noted, correlate clinically. No  acute osseous process.       Impression:      No focal pulmonary consolidation. Borderline heart size.  Follow-up as clinical indications persist.     This report was finalized on 6/30/2024 6:37 AM by Dr. Jacinto Alvarenga M.D on Workstation: [id number]                 Treatment Team at [facility]  Treatment Team:   Attending Provider: Agapito Acevedo MD  Consulting Physician: Niko Cheatham MD  Cardiologist: Arcadio Miguel MD  Consulting Physician: Sabino Harley MD  Consulting Physician: Romulo Wilder MD  Admitting Provider: Agapito Acevedo MD      Presenting Problem/History of Present Illness  Chief Complaint   Patient presents with    Chest Pain    Shortness of Breath     Hospital Course           Chief Complaint:   Chest pain and Shortness of breath     History of Present Illness:     Subjective  Interval History: Patient is a 84 y.o.female who presented with  complaints of left back and scapular pain which has been present on and off for the last 6 months.  Her pain has recently been evaluated by Dr. Harley in orthopedics and he had ordered an MRI of her C-spine and scapula which was scheduled for later this week.  He has also done trigger point injections into the paraspinous muscles and has had her go to physical therapy.  All of these efforts have helped somewhat but not completely relieved the discomfort that she is experiencing.  In the last few days the pain is flared up and become much more severe and as a result she came to the emergency room for evaluation.  Pain seems to radiate around to  the left side of her chest and seems to originate somewhere in the area of the lower scapula.  Recent pain has indeed been more severe than her pain in the past.  patient was also discovered in the emergency room to have rapid ventricular response with her permanent atrial fibrillation and she was totally unaware that this was going on when she arrived in the emergency room. Her only real symptom  was dyspnea with exertion that has been worse recently.  The symptoms seem to get worse when she walks up stairs or goes uphill.  Her shortness of breath has recently been a little more severe and was particularly of note when she was in the emergency room with atrial fibrillation and rapid ventricular rate.  Patient did have a cardiac catheterization done last year that showed no significant coronary disease and echocardiogram at that time also showed relatively good function of her heart.  She does have some mild valvular disease and some left atrial enlargement but these have not caused any significant disease for the patient.  Patient's heart rate was actually at 188 in the emergency room on presentation but fortunately her O2 sats were well-maintained and she was not hypotensive.  Her troponins were negative, her chest x-ray was negative, and EKG that was done in the ER showed heart rate down to 144.   labs were remarkable for a low sodium at 132 and a digoxin level that was undetectable.  Patient generally is very active and plays golf on a very regular basis without any particular symptoms until this pain in the thoracic and scapular area is developed.  Pain she is having now is very similar to the pain that she had before she had her right rotator cuff repaired.       7/1/2024.  I personally saw the patient for the first time during this hospitalization on this date in her room on 4 S. #419.  Patient was resting quietly in bed at the time of my visit and had just finished eating her lunch.  She was sipping on a cup  of coffee.  I wore full PPE as indicated including an N95 mask as needed, gloves when touching patient, goggles, and white lab coat.  I performed thorough hand hygiene before and after the patient visit.  Patient does seem a little anxious about the pain that she is experiencing.  She is anxious to get to the bottom of the pain and is wondering if her MRIs have been ordered.  I did review her chart and did not find orders for the MRI of the cervical spine and the scapula and shoulder which were the test that had been ordered previously by Dr. Harley.  I did order these tests and I contacted Michelle who is the nurse that works with Dr. Harley and she will let him know of the need to see the patient.  When the MRI results came back later in the day showing a significant disruption of the left rotator cuff, I did contact Dr. Harley by text messaging and let him know of the result.  He plans to see the patient during his rounds tomorrow or try to see her between surgical cases.  I did relay this information to the patient who is looking forward to seeing Dr. Harley and working further on her issues tomorrow.     7/2/2024.  Patient seen again in her room on [address].  Patient is resting quietly in bed with ongoing left shoulder pain that is radiating around to her left anterior chest wall.  I did have a chance to talk with Dr. Harley about this patient.  He really believes that even though her left rotator cuff is a mess and needs to be surgically replaced, that   her pain is probably coming from disease of the nerves in her neck.  Therefore he suggests a neurosurgery consult and possibly an epidural by pain management prior to patient's discharge.  I did also discussed with him that I will adjust patient's pain medications to include Norco 5/325 4 times daily.  Patient does want to discontinue the tramadol at present time. patient still appears very tired and her mobility seems to be greatly limited by her  current disease and discomfort.  patient does have family support but none of them are present today.  Dr. Subramanian still believes that there is no cardiac cause for the pain she is describing.  Aortic stenosis murmur still appreciated.  Cath showed moderate nonobstructive disease.  He did recommend patient change to Eliquis 2.5 mg daily and if patient needs shoulder surgery this can be discontinued.  Patient is still planning discharge to home with home health.  Dr. Harley did see the patient in consultation also.  He does believe she has a full-thickness full width tear of the left supraspinatus tendon but he does not believe that the pain she is experiencing in the inferior left scapular area is related to this finding.    He did give the patient a trigger point injection in this area in the past but without much relief.  Patient has multilevel central canal cervical spineModerate patchy deep cerebral white matter FLAIR was seen on MRI.  There is also evidence of chronic small vessel ischemic white matter changes.  There was no evidence of an acute infarction or acute intracranial abnormality. Neurosurgery will be seeing patient in consultation.  We also ask pain management to see the patient.    At this point patient is quite stable medically.    7/3/2024. Patient was seen again today in her room  on 4 S. #419 in preparation for DC  Patient is resting quietly in bed at the time of my visit but did seem to have better control of her pain and says that she actually got a good night sleep last night after transition of the tramadol pain medication to low-dose Norco 5 mg which she has now taken twice with good relief each time.  I will full PPE for the exam as appropriate including an N95 mask when indicated, goggles, white lab coat, and gloves when touching patient.  I performed thorough hand hygiene before and after the patient visit.  Per nursing patient is now on room air.  Atrial fibs continues on monitor  with good rate control.  Pain management did leave a note suggesting that she would need to be off of her Eliquis at lowest 75 hours prior to a cervical epidural injection.   Nae Mauricio  from neurosurgery did see patient in consultation along with Dr. Wilder.    They did suggest that the place to start now would be with an epidural injection.  They can then follow the patient up in clinic for further evaluation of her discomfort.  Nae did send  an order to pain management for first epidural in-house and then follow-up with Dr. Otero if epidural seems to work.  Cardiology did see patient briefly today and they are pleased with heart rate which is staying in the 90s and 100s.  They were okay with discharge to home.  Patient will get digoxin level next week in our office for follow-up on dig.  Patient will probably need follow-up with Dr. Miguel in her office at a later date.  Patient is very anxious for discharge to home at this point.  She has family that will pick her up.  I did complete her discharge orders and patient is hopefully going to be able to get an epidural in the next week or so.  I did give her a 3-day supply of hydrocodone at time of discharge and she will follow-up in our office for further prescriptions.  At this point it is felt patient has maximized benefit from hospitalization  And is ready for discharge to home.       Vital Signs  Temp:  [97.7 °F (36.5 °C)-98.8 °F (37.1 °C)] 98.8 °F (37.1 °C)  Heart Rate:  [] 95  Resp:  [18] 18  BP: ()/(56-84) 95/56    Physical Exam at Discharge      Constitutional:             Alert, cooperative, no distress, AAOx3, resting comfortably   Head:                          Normocephalic, without obvious abnormality, atraumatic   Eyes:                          PERRLA, conjunctiva/corneas clear, no icterus, no conjunctival                                     pallor, EOM's intact, both eyes      ENT and Mouth:         Lips, tongue, gums normal; oral mucosa  pink and moist   Neck:                          Supple, symmetrical, trachea midline, no JVD  Respiratory:                 Clear to auscultation bilaterally, respirations unlabored  Cardiovascular:           Regular rate and rhythm, S1 and S2 normal, no murmur,                                       no  Rub or gallop.  Pulses normal.    Gastrointestinal:          BS present x 4 Soft, non-tender, bowel sounds active,                                       no masses, no hepatosplenomegaly                                                      :                             No hernia.  Normal exam for sex.         Musculoskeletal:        Extremities normal, atraumatic, no cyanosis or edema                                      No arthropathy.  No deformity.  Gait normal                                                 Skin:                           Skin is warm and dry,  no rashes, swelling or palpable lesions   Neurologic:                 CN -XII intact, motor strength grossly intact, sensation grossly intact to light touch, no focal reflex deficits noted    Psychiatric:                 Alert,oriented X3, no delusions, psychoses, depression or anxiety    Heme/Lymph/Imun:   No bruises, petechiae.  Lymph nodes normal in size/configuration          Pertinent Test Results:    Results from last 7 days   Lab Units 07/03/24  0433 07/02/24  0322 07/01/24  0454   SODIUM mmol/L 134* 131* 136   POTASSIUM mmol/L 4.2 4.2 4.3   CHLORIDE mmol/L 101 99 103   CO2 mmol/L 26.0 24.6 26.0   BUN mg/dL 9 7* 9   CREATININE mg/dL 0.66 0.74 0.87   GLUCOSE mg/dL 89 92 96   CALCIUM mg/dL 8.9 8.9 8.9       Results from last 7 days   Lab Units 07/03/24  0433 07/02/24 0322 07/01/24  0454   WBC 10*3/mm3 8.46 6.81 7.09   HEMOGLOBIN g/dL 13.0 12.9 13.2   HEMATOCRIT % 38.6 38.6 39.5   PLATELETS 10*3/mm3 188 177 186                   Attending Physician Final Assessment and Plan       1.  Permanent atrial fibrillation with rapid ventricular rate at time of  presentation in the emergency room. Cardiology has adjusted patient's medications and heart rate is under much better control at present time.  Digoxin has been reintroduced and dose has been corrected by cardiology to try to keep things under better control.  Patient breathing better now that the permanent atrial fibrillation is under better rate control.  Cardiology continuing to follow the patient for now.  Digoxin level has been increased.  Heart rate now seems to be stable.  Cardiology will follow-up in clinic with Dr. Miguel at a later date.        2.  Left thoracic/scapular chest wall pain with cardiac cause ruled out and workup revealing disruption of left rotator cuff.   Problems with right rotator cuff  in the past,seem to be similar to this is  discomfort on the left side.  MRI completed today is consistent with left rotator cuff disruption.  I have contacted Dr. Harley who follows this patient and he will be seeing patient at some point tomorrow morning or later in the afternoon to help plan further treatment of patient's pain and discomfort.  I did consult pain management but we will hold off on interventions with them until Dr. Harley has a chance to look at the patient.  For now patient's pain seems to be fairly well-controlled.  Patient to follow-up with Dr. Harley in the office later.      3.  Dyspnea on exertion.    This issue is probably a function of the patient's severe pain with respect to the left rotator cuff disruption.  Otherwise patient is generally quite healthy and very active.      4.  Hypertension.    There has been some acceleration of patient's blood pressure readings also primarily due to pain.  With pain under better control blood pressure readings seem to be significantly  improved.    Hopefully with help from Dr. Harley will be able to better control the situation.      5.  Gastroesophageal reflux disease on PPI,   Continue treatment of gastroesophageal reflux as in the past  with PPI.      6.  Rheumatoid arthritis with chronic changes and currently stable on medications.    Patient does follow-up regularly with rheumatology here in [place].  On medications for treatment of her rheumatoid arthritis with significant improvement in changes in joint disfigurement especially in hands      7.  Hyponatremia.   Patient on IV fluids at time of admission.  Patient seems to be drinking and eating well.  I have discontinued the fluids for now and will continue to follow the sodium level was.      8.  Valvular heart disease with known aortic stenosis.    Patient seems to be under good control at present time with her valvular heart disease.  No additional changes or treatment in this area are currently needed.      9.  Cervical disc disease with cervical stenosis and radiculitis of pain to scapular and anterior left chest areas.   epidural trial is to be scheduled with in-house pain management.  Patient does need to be off of her Eliquis for 75 hours prior to the procedure.  Neurosurgery did see patient in consultation and feels that she can follow-up with them after epidural if needed.  Dr. Otero in pain management may also be able to help with this problem.       Condition on Discharge:    Stable    Discharge Disposition  Home or Self Care    Transport Plan   family to transport home    Hospital Treatments discontinued at time of Discharge  IV, Telemetry, Serrano Catheter, Deep Lines and PICC LInes    Discharge Medications     Discharge Medications        New Medications        Instructions Start Date   cyclobenzaprine 10 MG tablet  Commonly known as: FLEXERIL   10 mg, Oral, Every 8 Hours PRN      HYDROcodone-acetaminophen 5-325 MG per tablet  Commonly known as: NORCO   1 tablet, Oral, Every 4 Hours PRN             Changes to Medications        Instructions Start Date   digoxin 125 MCG tablet  Commonly known as: LANOXIN  What changed:   how much to take  how to take this  when to take  this  additional instructions   125 mcg, Oral, Daily   Start Date: July 4, 2024     ferrous sulfate 325 (65 FE) MG tablet  Commonly known as: FeroSul  What changed:   medication strength  See the new instructions.   325 mg, Oral, Daily With Breakfast   Start Date: July 4, 2024            Continue These Medications        Instructions Start Date   acetaminophen 325 MG tablet  Commonly known as: TYLENOL   650 mg, Oral, Every 4 Hours PRN      amLODIPine 2.5 MG tablet  Commonly known as: NORVASC   2.5 mg, Oral, Daily      apixaban 2.5 MG tablet tablet  Commonly known as: ELIQUIS   2.5 mg, Oral, Every 12 Hours Scheduled, Resumed on 12/22/2023.      atorvastatin 10 MG tablet  Commonly known as: LIPITOR   10 mg, Oral, Daily      carvedilol 25 MG tablet  Commonly known as: COREG   25 mg, Oral, 2 Times Daily With Meals      fluorometholone 0.1 % ophthalmic suspension  Commonly known as: FML   SHAKE GENTLY AND INSTILL ONE DROP IN RIGHT EYE BID.      folic acid 1 MG tablet  Commonly known as: FOLVITE   1 tablet, Oral, Daily      furosemide 20 MG tablet  Commonly known as: Lasix   20 mg, Oral, Daily   Start Date: July 4, 2024     loteprednol 0.5 % ophthalmic suspension  Commonly known as: LOTEMAX   1 drop, Both Eyes, 2 Times Daily      methotrexate 2.5 MG tablet   7 TABLETS PER WEEK      ofloxacin 0.3 % ophthalmic solution  Commonly known as: OCUFLOX   INSTILL 1 DROP INTO RIGHT EYE 3 TIMES A DAY FOR 1 WEEK      pantoprazole 40 MG EC tablet  Commonly known as: PROTONIX   40 mg, Oral, Daily      potassium chloride 20 MEQ CR tablet  Commonly known as: KLOR-CON M20                Home Medication List  Prior to Admission medications    Medication Sig Start Date End Date Taking? Authorizing Provider   acetaminophen (TYLENOL) 325 MG tablet Take 2 tablets by mouth Every 4 (Four) Hours As Needed for Mild Pain . 5/14/21  Yes Niko Cheatham MD   amLODIPine (NORVASC) 2.5 MG tablet TAKE 1 TABLET BY MOUTH DAILY 6/3/24  Yes Maira Jay  ANTONY PEREZ   apixaban (ELIQUIS) 2.5 MG tablet tablet Take 1 tablet by mouth Every 12 (Twelve) Hours. Resumed on 12/22/2023.  Indications: Atrial Fibrillation 12/20/23  Yes Myrna Ulloa MD   atorvastatin (LIPITOR) 10 MG tablet Take 1 tablet by mouth Daily. 12/21/23  Yes Maira Jay APRN   carvedilol (COREG) 25 MG tablet Take 1 tablet by mouth 2 (Two) Times a Day With Meals. 5/14/21  Yes Niko Cheatham MD   cyclobenzaprine (FLEXERIL) 10 MG tablet Take 1 tablet by mouth Every 8 (Eight) Hours As Needed for Muscle Spasms. 7/3/24  Yes Niko Cheatham MD   digoxin (LANOXIN) 125 MCG tablet Take 1 tablet on Monday, Wednesday, and Fridays  Patient taking differently: Take 2 tablets by mouth Every Other Day. Take 1 tablet on Monday, Wednesday, and Fridays 2/27/24  Yes Maira Jay APRN   digoxin (LANOXIN) 125 MCG tablet Take 1 tablet by mouth Daily. 7/4/24  Yes Niko Cheatham MD   FeroSul 325 (65 Fe) MG tablet  4/10/24  Yes Damari Silveira MD   ferrous sulfate (FeroSul) 325 (65 FE) MG tablet Take 1 tablet by mouth Daily With Breakfast. 7/4/24  Yes Niko Cheatham MD   fluorometholone (FML) 0.1 % ophthalmic suspension SHAKE GENTLY AND INSTILL ONE DROP IN RIGHT EYE BID. 4/27/18  Yes Damari Silveira MD   folic acid (FOLVITE) 1 MG tablet Take 1 tablet by mouth Daily. 5/15/23  Yes Damari Silveira MD   furosemide (Lasix) 20 MG tablet Take 1 tablet by mouth Daily. 5/15/21  Yes Niko Cheatham MD   furosemide (Lasix) 20 MG tablet Take 1 tablet by mouth Daily. 7/4/24  Yes Niko Cheatham MD   HYDROcodone-acetaminophen (NORCO) 5-325 MG per tablet Take 1 tablet by mouth Every 4 (Four) Hours As Needed for Moderate Pain or Severe Pain for up to 3 days. 7/3/24 7/6/24 Yes Niko Cheatham MD   loteprednol (LOTEMAX) 0.5 % ophthalmic suspension Administer 1 drop to both eyes 2 (Two) Times a Day. 11/16/21  Yes Provider, MD Damari   methotrexate 2.5 MG tablet 7 TABLETS PER WEEK 1/12/23  Yes  Provider, MD Damari   ofloxacin (OCUFLOX) 0.3 % ophthalmic solution INSTILL 1 DROP INTO RIGHT EYE 3 TIMES A DAY FOR 1 WEEK 4/19/18  Yes ProviderDamari MD   pantoprazole (PROTONIX) 40 MG EC tablet Take 1 tablet by mouth daily. 9/8/16  Yes Niko Cheatham MD   potassium chloride (KLOR-CON M20) 20 MEQ CR tablet  5/15/24  Yes ProviderDamari MD       Discharge Diet   Diet Orders (active) (From admission, onward)       Start     Ordered    06/30/24 1239  Diet: Cardiac; Healthy Heart (2-3 Na+); Fluid Consistency: Thin (IDDSI 0)  Diet Effective Now         06/30/24 1243                    Activity at Discharge  Activity Instructions       Activity as Tolerated      Gradually Increase Activity Until at Pre-Hospitalization Level              Follow-up Appointments  Future Appointments   Date Time Provider Department Center   10/21/2024 10:45 AM [facility]   10/21/2024 11:45 AM Arcadio Miguel MD [facility]     Additional Instructions for the Follow-ups that You Need to Schedule       Discharge Follow-up with PCP   As directed       Currently Documented PCP:    Niko Cheatham MD    PCP Phone Number:    [phone]     Follow Up Details: Dr. Cheatham  to see in 7-10 days        Discharge Follow-up with Specified Provider: Dr Harley in Orthopedics in 3-4 weeks or as previously scheduled.   As directed      To: Dr Harley in Orthopedics in 3-4 weeks or as previously scheduled.            Neurosurgery follow up with Dr. Wilder after cervical epidural planned.    Referral to pain management for cervical epidural.        Test Results Pending at Discharge      Niko Cheatham MD  7/3/2024  1650 EST    Time: Discharge 45 min

## 2024-07-04 PROBLEM — M50.13 CERVICAL DISC DISORDER WITH RADICULOPATHY, CERVICOTHORACIC REGION: Status: ACTIVE | Noted: 2024-07-04

## 2024-07-04 PROBLEM — M48.02 CERVICAL STENOSIS OF SPINAL CANAL: Status: ACTIVE | Noted: 2024-07-04

## 2024-07-04 PROBLEM — G95.20 CERVICAL SPINAL CORD COMPRESSION: Status: ACTIVE | Noted: 2024-07-04

## 2024-07-04 NOTE — CASE MANAGEMENT/SOCIAL WORK
Case Management Discharge Note      Final Note: Home, family to transport         Selected Continued Care - Discharged on 7/3/2024 Admission date: 6/30/2024 - Discharge disposition: Home or Self Care      Destination    No services have been selected for the patient.                Durable Medical Equipment    No services have been selected for the patient.                Dialysis/Infusion    No services have been selected for the patient.                Home Medical Care    No services have been selected for the patient.                Therapy    No services have been selected for the patient.                Community Resources    No services have been selected for the patient.                Community & DME    No services have been selected for the patient.                    Transportation Services  Private: Car    Final Discharge Disposition Code: 01 - home or self-care

## 2024-07-04 NOTE — OUTREACH NOTE
Prep Survey      Flowsheet Row Responses   [facility] facility patient discharged from? [place]   Is LACE score < 7 ? No   Eligibility Readm Mgmt   Discharge diagnosis Chronic scapular pain   Does the patient have one of the following disease processes/diagnoses(primary or secondary)? Other   Does the patient have Home health ordered? No   Is there a DME ordered? No   Prep survey completed? Yes            Ann PEREZ - Registered Nurse

## 2024-07-09 ENCOUNTER — READMISSION MANAGEMENT (OUTPATIENT)
Dept: CALL CENTER | Facility: HOSPITAL | Age: 84
End: 2024-07-09
Payer: MEDICARE

## 2024-07-09 NOTE — OUTREACH NOTE
Medical Week 1 Survey      Flowsheet Row Responses   [facility] patient discharged from? [place]   Does the patient have one of the following disease processes/diagnoses(primary or secondary)? Other   Week 1 attempt successful? Yes   Call start time 1807   Call end time 1809   Discharge diagnosis Chronic scapular pain   Person spoke with today (if not patient) and relationship pt   Meds reviewed with patient/caregiver? Yes   Is the patient having any side effects they believe may be caused by any medication additions or changes? No   Does the patient have all medications ordered at discharge? Yes   Is the patient taking all medications as directed (includes completed medication regime)? Yes   Does the patient have a primary care provider?  Yes   Does the patient have an appointment with their PCP within 7 days of discharge? Yes   Has the patient kept scheduled appointments due by today? Yes   Psychosocial issues? No   Did the patient receive a copy of their discharge instructions? Yes   Nursing interventions Reviewed instructions with patient   What is the patient's perception of their health status since discharge? Same   Is the patient/caregiver able to teach back signs and symptoms related to disease process for when to call PCP? Yes   Is the patient/caregiver able to teach back signs and symptoms related to disease process for when to call 911? Yes   Is the patient/caregiver able to teach back the hierarchy of who to call/visit for symptoms/problems? PCP, Specialist, Home health nurse, Urgent Care, ED, 911 Yes   If the patient is a current smoker, are they able to teach back resources for cessation? Not a smoker   Week 1 call completed? Yes   Would this patient benefit from a Referral to Amb Social Work? No   Is the patient interested in additional calls from an ambulatory ? No   Wrap up additional comments Pt reports left shoulder pain. Reviewed AVS/meds with pt. Pt verified Epidural appt,  and pt had PCP fu appt.   Call end time 1809            Diane PEREZ - Registered Nurse

## 2024-07-10 ENCOUNTER — ANESTHESIA EVENT (OUTPATIENT)
Dept: PAIN MEDICINE | Facility: HOSPITAL | Age: 84
End: 2024-07-10
Payer: MEDICARE

## 2024-07-10 RX ORDER — ATORVASTATIN CALCIUM 10 MG/1
10 TABLET, FILM COATED ORAL DAILY
Qty: 90 TABLET | Refills: 1 | Status: SHIPPED | OUTPATIENT
Start: 2024-07-10

## 2024-07-12 ENCOUNTER — ANESTHESIA (OUTPATIENT)
Dept: PAIN MEDICINE | Facility: HOSPITAL | Age: 84
End: 2024-07-12
Payer: MEDICARE

## 2024-07-12 ENCOUNTER — HOSPITAL ENCOUNTER (OUTPATIENT)
Dept: GENERAL RADIOLOGY | Facility: HOSPITAL | Age: 84
Discharge: HOME | End: 2024-07-12
Payer: MEDICARE

## 2024-07-12 ENCOUNTER — HOSPITAL ENCOUNTER (OUTPATIENT)
Dept: PAIN MEDICINE | Facility: HOSPITAL | Age: 84
Discharge: HOME | End: 2024-07-12
Payer: MEDICARE

## 2024-07-12 VITALS
HEART RATE: 99 BPM | BODY MASS INDEX: 18.33 KG/M2 | HEIGHT: 65 IN | WEIGHT: 110 LBS | OXYGEN SATURATION: 99 % | SYSTOLIC BLOOD PRESSURE: 143 MMHG | RESPIRATION RATE: 16 BRPM | DIASTOLIC BLOOD PRESSURE: 93 MMHG | TEMPERATURE: 97.7 F

## 2024-07-12 DIAGNOSIS — M48.02 CERVICAL STENOSIS OF SPINAL CANAL: Primary | ICD-10-CM

## 2024-07-12 DIAGNOSIS — R52 PAIN: ICD-10-CM

## 2024-07-12 PROCEDURE — 25010000002 DEXAMETHASONE SODIUM PHOSPHATE 10 MG/ML SOLUTION: Performed by: STUDENT IN AN ORGANIZED HEALTH CARE EDUCATION/TRAINING PROGRAM

## 2024-07-12 PROCEDURE — 25510000001 IOPAMIDOL 41 % SOLUTION: Performed by: STUDENT IN AN ORGANIZED HEALTH CARE EDUCATION/TRAINING PROGRAM

## 2024-07-12 PROCEDURE — 77003 FLUOROGUIDE FOR SPINE INJECT: CPT

## 2024-07-12 RX ORDER — SODIUM CHLORIDE 0.9 % (FLUSH) 0.9 %
1-10 SYRINGE (ML) INJECTION AS NEEDED
Status: DISCONTINUED | OUTPATIENT
Start: 2024-07-12 | End: 2024-07-13 | Stop reason: HOSPADM

## 2024-07-12 RX ORDER — DEXAMETHASONE SODIUM PHOSPHATE 10 MG/ML
10 INJECTION, SOLUTION INTRAMUSCULAR; INTRAVENOUS ONCE
Status: COMPLETED | OUTPATIENT
Start: 2024-07-12 | End: 2024-07-12

## 2024-07-12 RX ORDER — IOPAMIDOL 408 MG/ML
12 INJECTION, SOLUTION INTRATHECAL
Status: COMPLETED | OUTPATIENT
Start: 2024-07-12 | End: 2024-07-12

## 2024-07-12 RX ORDER — FENTANYL CITRATE 50 UG/ML
50 INJECTION, SOLUTION INTRAMUSCULAR; INTRAVENOUS AS NEEDED
Status: DISCONTINUED | OUTPATIENT
Start: 2024-07-12 | End: 2024-07-13 | Stop reason: HOSPADM

## 2024-07-12 RX ORDER — MIDAZOLAM HYDROCHLORIDE 1 MG/ML
1 INJECTION INTRAMUSCULAR; INTRAVENOUS AS NEEDED
Status: DISCONTINUED | OUTPATIENT
Start: 2024-07-12 | End: 2024-07-13 | Stop reason: HOSPADM

## 2024-07-12 RX ORDER — LIDOCAINE HYDROCHLORIDE 10 MG/ML
1 INJECTION, SOLUTION INFILTRATION; PERINEURAL ONCE AS NEEDED
Status: DISCONTINUED | OUTPATIENT
Start: 2024-07-12 | End: 2024-07-13 | Stop reason: HOSPADM

## 2024-07-12 RX ADMIN — DEXAMETHASONE SODIUM PHOSPHATE 10 MG: 10 INJECTION, SOLUTION INTRAMUSCULAR; INTRAVENOUS at 11:56

## 2024-07-12 RX ADMIN — IOPAMIDOL 10 ML: 408 INJECTION, SOLUTION INTRATHECAL at 11:58

## 2024-07-12 NOTE — H&P
CHIEF COMPLAINT:   Acute mid back pain for greater than 6 weeks    HISTORY OF PRESENT ILLNESS:  The patient is a 84 y.o. female who presents today with complaints of back neck scapular pain for greater than 6 weeks.  She has a history of rheumatoid arthritis and atrial fibrillation.  She presented to the ER on June 30 for severe scapular pain.  She had a cardiac workup.  She was seen by the neuro surgery service and reviewed with Dr. Wilder who recommended trial of cervical epidural steroid injection.  It is unclear whether the cervical epidural will be sufficient in treating the left scapular pain however given her cervical stenosis, they recommended a trial of ESTELLE.  She had a cervical MRI that showed multiple levels of disc bulging, cervical stenosis at C3-C7    The patient's pain is located in the mid back and radiates to left scapula.  Their pain is a 0 out of 10 at rest and spikes up to a 10 out of 10 with activity.  The symptom is described as constant, sharp.  The pain is stable in severity.   Their symptoms are exacerbated by nothing in particular, comes on randomly and alleviated by her muscle relaxers and heat/rest.    The pain is significant enough that it is interfering with the patient's activities of daily living including staying mobile, caring for herself and her home    The conservative measures the patient has attempted recently without significant improvement include: Rest, ice, heat, OTC medications, Tylenol, Flexeril, physical therapy.       PAST MEDICAL HISTORY:  Current Outpatient Medications on File Prior to Encounter   Medication Sig Dispense Refill    acetaminophen (TYLENOL) 325 MG tablet Take 2 tablets by mouth Every 4 (Four) Hours As Needed for Mild Pain .      amLODIPine (NORVASC) 2.5 MG tablet TAKE 1 TABLET BY MOUTH DAILY 90 tablet 1    apixaban (ELIQUIS) 2.5 MG tablet tablet Take 1 tablet by mouth Every 12 (Twelve) Hours. Resumed on 12/22/2023.  Indications: Atrial Fibrillation       atorvastatin (LIPITOR) 10 MG tablet TAKE 1 TABLET BY MOUTH DAILY 90 tablet 1    carvedilol (COREG) 25 MG tablet Take 1 tablet by mouth 2 (Two) Times a Day With Meals. 60 tablet 2    cyclobenzaprine (FLEXERIL) 10 MG tablet Take 1 tablet by mouth Every 8 (Eight) Hours As Needed for Muscle Spasms. 60 tablet 1    digoxin (LANOXIN) 125 MCG tablet Take 1 tablet by mouth Daily. 30 tablet 0    ferrous sulfate (FeroSul) 325 (65 FE) MG tablet Take 1 tablet by mouth Daily With Breakfast. 30 tablet 0    fluorometholone (FML) 0.1 % ophthalmic suspension SHAKE GENTLY AND INSTILL ONE DROP IN RIGHT EYE BID.  3    folic acid (FOLVITE) 1 MG tablet Take 1 tablet by mouth Daily.      furosemide (Lasix) 20 MG tablet Take 1 tablet by mouth Daily. 30 tablet 0    loteprednol (LOTEMAX) 0.5 % ophthalmic suspension Administer 1 drop to both eyes 2 (Two) Times a Day.      methotrexate 2.5 MG tablet 7 TABLETS PER WEEK      ofloxacin (OCUFLOX) 0.3 % ophthalmic solution INSTILL 1 DROP INTO RIGHT EYE 3 TIMES A DAY FOR 1 WEEK  0    pantoprazole (PROTONIX) 40 MG EC tablet Take 1 tablet by mouth daily. 90 tablet 3    potassium chloride (KLOR-CON M20) 20 MEQ CR tablet        No current facility-administered medications on file prior to encounter.       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    Added automatically from request for surgery [id number]    Esophageal reflux     Full dentures     Hepatomegaly     LIKELY SECONDARY TO ALCOHOL USE    History of alcohol use     quit 11/13    History of transfusion     Hypertension     IBS (irritable bowel syndrome)     with diarrhea    Ileus, unspecified  "05/14/2021    Left foot drop     LEFT FOOD DROP SECONDARY TO PERONEAL NERVE INJURY    Nonrheumatic aortic valve stenosis 08/11/2023    Osteoarthritis     OSTEO    Pancreatic pseudocyst resolved on CT 5/13/21 12/05/2016    Peripheral neuropathy     LEFT LOWER EXTREMITY    Permanent atrial fibrillation 08/11/2023    Personal history of gallstones     Personal history of malignant neoplasm of skin     S/P REMOVAL FROM LEFT KNEE    Primary osteoarthritis of both hands     Rheumatoid arthritis of multiple sites without organ or system involvement with positive rheumatoid factor 05/12/2021    Takotsubo syndrome     Likely secondary to acute illness in 11/13.  EF as low as 20-25% 11/27/13.  EF 63% by echo 4/14/14.    Thrombocytopenia          SOCIAL HISTORY:  Negative tobacco product use    REVIEW OF SYSTEMS:  No hematologic infectious or constitutional symptoms  Other review of systems non-contributory  Negative screen for PARAG      PHYSICAL EXAM:  /99 Comment: DR COLLINS MADE AWARE OF READINGS, PT REPORTS NO HA'S OR VISUAL DISTURBANCES AT THIS TIME  Pulse 94   Temp 36.5 °C (97.7 °F) (Infrared)   Resp 16   Ht 165.1 cm (65\")   Wt 49.9 kg (110 lb)   SpO2 100%   BMI 18.30 kg/m²   Well-developed, well-nourished, no acute distress  Alert and oriented ×3  Extra ocular movements intact  Uneven pupil, chronic  Airway: Mallampati 2  Unlabored respirations  Extremities warm and well-perfused  Upper extremity strength intact  No changes in sensation  Narrow-based, coordinated gait      DIAGNOSIS:  Post-Op Diagnosis Codes:     * 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  Cervical 7 epidural steroid injections, up to 3.     If the patient does not get adequate relief of her symptoms with the cervical epidural consider further thoracic spine imaging.    If pain control is acceptable after 1 or 2 injections, it was discussed with the patient that they may return for the subsequent injections " if and when their pain returns.  The risks were discussed with the patient including failure of relief, worsening pain, Headache (post dural puncture headache), bleeding (epidural hematoma) and infection (epidural abscess or skin infection).  2.  Physical therapy exercises at home as prescribed by physical therapy or from the pain clinic handout.  Continuation of these exercises every day, or multiple times per week, even when the patient has good pain relief, was stressed to the patient as a preventative measure to decrease the frequency and severity of future pain episodes.  3.  Continue pain medicines as already prescribed.  If patient not currently taking any, it is recommended to begin Acetaminophen 1000 mg po q 8 hours.  If other medicines containing Acetaminophen are currently prescribed, maintain daily dose at 3000 mg.    4.  If they can tolerate NSAIDS, it is recommended to take Ibuprofen 600 mg po q 6 hours for 7 days during pain exacerbations.  Alternatively, they may substitute an NSAID of their choice (e.g. Aleve).  This may be taken at the same time as Acetaminophen.  5.  Heat and ice to the affected area as tolerated for pain control.    6.  Daily low impact exercise such as walking or water exercise was recommended to maintain overall health and aid in weight control.   7.  Follow up as needed for subsequent injections.

## 2024-07-12 NOTE — ANESTHESIA PROCEDURE NOTES
PAIN Epidural block      Patient reassessed immediately prior to procedure    Patient location during procedure: pain clinic  Indication:procedure for pain  Performed By  Anesthesiologist: Yakelin Butt MD  Preanesthetic Checklist  Completed: patient identified, risks and benefits discussed, surgical consent, monitors and equipment checked, pre-op evaluation and timeout performed  Additional Notes  Needle placement guided by fluoroscopy and confirmed with ASUNCION and contrast injection.     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  Pt Tolerance:patient tolerated the procedure well with no apparent complications  Complications:no             DISCHARGE

## 2024-07-12 NOTE — DISCHARGE INSTRUCTIONS
"Guide To Relieving And Avoiding Neck Pain    Exercise is important to help prevent and treat neck pain.  Good posture, exercise and avoiding injury will help to keep your neck healthy.    When the neck is strained or over worked symptoms may include headache, upper back pain, shoulder pain or arm pain.  Numbness or tingling in the fingers, dizziness or nausea may also occur.    Posture:    Avoid slumping over a desk.  Raise your work (including computer) to eye level to avoid bending at the neck.      Change Position Often:  Changing position prevents overuse of particular muscles.    Sleep On One Pillow:  Using to many pillows or to large of a pillow causes a \"kink\" in you neck.      Move and Exercise:  Living an active lifestyle is an important part of staying healthy.  Be sure to include the exercise to follow specifically for your neck.                    Range of Motion Exercises:  Do these exercises three times a day.  If you experience increased pain stop and contact your physician.  All exercises can be performed sitting or standing.        1.    2.   3.    1.  Place both hands behind you neck.  Gently tilt your neck backward so that you are looking at the ceiling.  Hold for a count of 10.    2.  Look straight facing forward.  Slowly tip your ear toward your right shoulder.  Do not force the motion.  Hold for a count of 10.  Bring head back to starting position and repeat to left side.    3.  Look straight facing forward.  Gently turn your head to the right.  Do not force the motion.  Hold for a count of 10.  Bring head back to starting position and repeat to the left side.    Exercises To Strengthen Muscles:  1.  Look straight facing forward.  Relax your shoulders.  Raise both shoulders toward your ears.  Hold for 3 seconds.       1.       2.   3.      1.  Look straight facing forward.  Relax your shoulders.  Raise both shoulders toward     2.  Raise your ars to your side and bend your elbows.  Squeeze " shoulder blades together as you rotate your arms outward.  Hold for 5 seconds.    3.  Look straight facing forward.  Pull your head straight back.  Do not tip or move your jaw.  Hold for 5 seconds.   EPIDURAL STEROID INJECTION          An epidural steroid injection is a shot of steroid medicine and numbing medicine that is given into the space between the spinal cord and the bones of the back (epidural space).  The injection helps relieve pain by an irritated or swollen nerve root.    TELL YOUR HEALTH CARE PROVIDER ABOUT:  Any allergies you have  All medicines you are taking including any over the counter medicines  Any blood disorders you have  Any surgeries you have had  Any medical conditions you have  Whether you are pregnant or may be pregnant    WHAT ARE THE RISK?  Generally, this is a safe procedure. However,problems may occur, including  Headache  Bleeding  Infection  Allergic Reaction  Nerve Damage    WHAT CAN I EXPECT AFTER THE PROCEDURE?    INJECTION SITE  Remove the Band-Aid/s after 24 hours  Check your injection site every day for signs of infection.  Check for:             Redness             Bleeding (small amt is normal)             Warmth             Pus or bad odor  Some numbness may be experienced for several hours following the procedure.  Avoid using heat on the injection site for 24 hours. You may use ice intermittently if needed by placing a         towel between your skin and the ice bag and using the ice for 20 minutes 2-3 times a day.  Do not take baths, swim or use a hot tub for 24 hours.    ACTIVITY  No strenuous activity for 24 hours then return to normal activity as tolerated.  If your leg is numb, no driving until full sensation and strength has returned.    GENERAL INSTRUCTIONS:  The injection site may feel numb, use ice with caution if numbness is present and no heat for 24 hours or until numbness is gone.   If you have numbness or weakness in your arm or leg, use those areas with  caution until normal sensation returns.  It is not uncommon to notice an increase in discomfort or a change in the location of discomfort for 3-4 days after the procedure.  If discomfort is noticed at the injection site, ice may be            applied to that area for 20 min 2-3 times a day.  Take the pain medicine your physician has prescribed or over the counter pain relievers as long as you do not have any contraindications.  If you are a diabetic, monitor your blood sugar closely.  The steroids used in your procedure may increase your blood sugar level up to 36 hours after the injection.  If your blood sugar is greater than 250, call the physician that helps you monitor your blood sugar.  Keep all follow-up visits as scheduled by your health care provider. This is important.    CONTACT OUR OFFICE IF:  You have any of these signs of infection            -Redness, swelling, or warmth around your injection site.            -Fluid or blood coming from your injection site (small amt of blood is normal)            -Pus or a bad odor from your injection site            -A fever  You develop a severe headache or a stiff neck  You lose control of your bladder or bowel movements      PAIN MANAGEMENT CENTER HOURS   Monday-Friday 7:30 am. - 4:00 pm.  For any problem related to your procedure we can be reached at [phone].  If you experience an emergency with your procedure, call [phone] or go to the emergency room.

## 2024-07-15 NOTE — PROGRESS NOTES
"Adult Nutrition  Assessment/PES    Patient Name:  Bhumi Gimenez  YOB: 1940  MRN: [id number]  Admit Date:  11/16/2017    Assessment Date:  11/17/2017    Patient seen for low BMI-patient stated #, currently 105#. Encouraged adequate po intake- declined need for supplement. Will follow per protocol           Reason for Assessment       11/17/17 1300    Reason for Assessment    Reason For Assessment/Visit identified at risk by screening criteria    Identified At Risk By Screening Criteria Low BMI 18.4    Diagnosis   Reverse Total Shoulder Arthroplasty                 Anthropometrics       11/17/17 1301    Anthropometrics (Special Considerations)    Height Used for Calculations 1.613 m (5' 3.5\")    Weight Used for Calculations 47.6 kg (105 lb)    RD Calculated     RD Calculated % IBW 91    RD Calculated BMI (kg/m2) 18.4    Usual Body Weight (UBW)    Usual Body Weight 48.1 kg (106 lb)    Body Mass Index (BMI)    BMI Grade 17 - 19 low grade I            Labs/Tests/Procedures/Meds       11/17/17 1302    Labs/Tests/Procedures/Meds    Diagnostic Test/Procedure Review reviewed    Labs/Tests Review Reviewed    Medication Review Reviewed, pertinent   iron, PPI, NaCl    Significant Vitals reviewed            Physical Findings       11/17/17 1302    Physical Findings/Assessment    Additional Documentation --   B=17            Estimated/Assessed Needs       11/17/17 1302    Calculation Measurements    Weight Used For Calculations 47.6 kg (105 lb)    Height Used for Calculations 1.6 m (5' 3\")    Estimated/Assessed Energy Needs    Energy Need Method Kcal/kg    kcal/kg 30    30 Kcal/Kg (kcal) 1428.84    Estimated Kcal Range  3492-6380    Estimated/Assessed Protein Needs    Weight Used for Protein Calculation 47.6 kg (105 lb)    Protein (gm/kg) 1.0    1.0 Gm Protein (gm) 47.63    Estimated/Assessed Fluid Needs    Fluid Need Method RDA method    RDA Method (mL)  1190            Nutrition Prescription " Detail Level: Simple Ordered       11/17/17 1302    Nutrition Prescription PO    Current PO Diet Regular            Evaluation of Received Nutrient/Fluid Intake       11/17/17 1303    PO Evaluation    Number of Meals 2    % PO Intake 25            Problem/Interventions:        Problem 1       11/17/17 1303    Nutrition Diagnoses Problem 1    Problem 1 Underweight    Etiology (related to) MNT for Treatment/Condition    Signs/Symptoms (evidenced by) BMI    BMI 18 - 18.9                    Intervention Goal       11/17/17 1303    Intervention Goal    General Maintain nutrition    PO Tolerate PO;Increase intake    Weight Maintain weight            Nutrition Intervention       11/17/17 1303    Nutrition Intervention    RD/Tech Action Interview for preference;Follow Tx progress;Care plan reviewd;Encourage intake              Education/Evaluation       11/17/17 1303    Education    Education Will Instruct as appropriate    Monitor/Evaluation    Monitor Per protocol    Education Follow-up Reinforce PRN        Electronically signed by:  Francine Ma RD  11/17/17 1:03 PM   Render Risk Assessment In Note?: no Additional Notes: Performing Provider: TERESA COOPER\\nRepairing Provider (if applicable):\\nProcedure: EXCISION\\nSurgical Location: RIGTH ANTERIOR SHOULDER \\nDiagnosis (per pathology report): BCC\\nSize of lesion (size provided on pathology report):\\n\\nSee a physician for any heart conditions (If yes, who and for what): N\\nArtificial Heart Valves: N\\nMitral valve prolapse: N\\nHeart Murmur: N\\nPacemaker: N\\nDefibrillator: N\\nArtificial joints (if yes, indicate location and year): N\\nDoes the patient pre-op medicate with antibiotics (if yes, what medication): N\\n**Pharmacy: N\\nHistory of renal disease or on dialysis: N\\nHistory of liver disease: N\\nHistory of bleeding disorder: N\\nLow platelet count (if the patient can provider):N\\nWill patient discontinue blood thinners, including NSAIDS (Advil, Motrin, ibuprofen & fish oil) (discontinue only by provider order):N\\nImmunosuppressed: N\\nPatient verbalizes understanding of pre-operative instructions: Y\\n\\nNotes (if applicable):

## 2024-07-22 ENCOUNTER — TELEPHONE (OUTPATIENT)
Dept: ORTHOPEDIC SURGERY | Facility: CLINIC | Age: 84
End: 2024-07-22
Payer: MEDICARE

## 2024-07-31 ENCOUNTER — OFFICE VISIT (OUTPATIENT)
Dept: WOUND CARE | Facility: HOSPITAL | Age: 84
End: 2024-07-31
Payer: MEDICARE

## 2024-07-31 PROCEDURE — G0463 HOSPITAL OUTPT CLINIC VISIT: HCPCS

## 2024-07-31 PROCEDURE — 97602 WOUND(S) CARE NON-SELECTIVE: CPT

## 2024-08-07 ENCOUNTER — OFFICE VISIT (OUTPATIENT)
Dept: WOUND CARE | Facility: HOSPITAL | Age: 84
End: 2024-08-07
Payer: MEDICARE

## 2024-08-12 ENCOUNTER — OFFICE VISIT (OUTPATIENT)
Dept: ORTHOPEDIC SURGERY | Facility: CLINIC | Age: 84
End: 2024-08-12
Payer: MEDICARE

## 2024-08-12 VITALS — HEIGHT: 63 IN | WEIGHT: 105.3 LBS | BODY MASS INDEX: 18.66 KG/M2 | TEMPERATURE: 98.6 F

## 2024-08-12 DIAGNOSIS — M19.019 ARTHRITIS OF SHOULDER: ICD-10-CM

## 2024-08-12 DIAGNOSIS — Z09 FRACTURE FOLLOW-UP: ICD-10-CM

## 2024-08-12 DIAGNOSIS — M25.512 PERISCAPULAR PAIN OF LEFT SHOULDER: Primary | ICD-10-CM

## 2024-08-12 PROCEDURE — 99213 OFFICE O/P EST LOW 20 MIN: CPT | Performed by: ORTHOPAEDIC SURGERY

## 2024-08-12 PROCEDURE — 1159F MED LIST DOCD IN RCRD: CPT | Performed by: ORTHOPAEDIC SURGERY

## 2024-08-12 PROCEDURE — 73000 X-RAY EXAM OF COLLAR BONE: CPT | Performed by: ORTHOPAEDIC SURGERY

## 2024-08-12 PROCEDURE — 20610 DRAIN/INJ JOINT/BURSA W/O US: CPT | Performed by: ORTHOPAEDIC SURGERY

## 2024-08-12 PROCEDURE — 1160F RVW MEDS BY RX/DR IN RCRD: CPT | Performed by: ORTHOPAEDIC SURGERY

## 2024-08-12 RX ORDER — LIDOCAINE HYDROCHLORIDE 10 MG/ML
2 INJECTION, SOLUTION EPIDURAL; INFILTRATION; INTRACAUDAL; PERINEURAL
Status: COMPLETED | OUTPATIENT
Start: 2024-08-12 | End: 2024-08-12

## 2024-08-12 RX ORDER — METHYLPREDNISOLONE ACETATE 80 MG/ML
80 INJECTION, SUSPENSION INTRA-ARTICULAR; INTRALESIONAL; INTRAMUSCULAR; SOFT TISSUE
Status: COMPLETED | OUTPATIENT
Start: 2024-08-12 | End: 2024-08-12

## 2024-08-12 RX ADMIN — LIDOCAINE HYDROCHLORIDE 2 ML: 10 INJECTION, SOLUTION EPIDURAL; INFILTRATION; INTRACAUDAL; PERINEURAL at 16:19

## 2024-08-12 RX ADMIN — METHYLPREDNISOLONE ACETATE 80 MG: 80 INJECTION, SUSPENSION INTRA-ARTICULAR; INTRALESIONAL; INTRAMUSCULAR; SOFT TISSUE at 16:19

## 2024-08-12 NOTE — PROGRESS NOTES
Chief complaint: Follow-up left clavicle fracture, left shoulder osteoarthritis    Ms. Gimenez follows up today for her left upper extremity.  Since I last saw her, she got the epidural.  She says it completely took away 100% of her scapular pain.  She is having shoulder pain and she would like to get the injection repeated today.  The posterior scapular pain is completely gone.  I did ask her about the clavicle.  She says this does not hurt her at all.    Left shoulder: Skin is benign.  No tenderness over the distal clavicle.  I do not appreciate any gross motion at the fracture site.  Shoulder motion is good.  She has moderate discomfort anteriorly over the rotator interval.  No effusion.    AP and orthogonal views left clavicle are ordered and reviewed to evaluate her clavicle fracture.  These compared to previous x-rays.  There may be some subtle increased callus formation but it is hard to say for sure.  The fracture line is still visible..    Assessment: 1.  Left clavicle delayed union 2.  Left shoulder osteoarthritis    Plan: I told her that if she is not having pain at the clavicle then I would not recommend any intervention.  She agrees.  The risk, benefits and alternatives to a left shoulder injection were discussed.  She consented and the procedure was performed as described below.  She will follow-up as needed.      Large Joint Arthrocentesis: L glenohumeral  Date/Time: 8/12/2024 4:19 PM  Consent given by: patient  Site marked: site marked  Timeout: Immediately prior to procedure a time out was called to verify the correct patient, procedure, equipment, support staff and site/side marked as required   Supporting Documentation  Indications: pain   Procedure Details  Location: shoulder - L glenohumeral  Preparation: Patient was prepped and draped in the usual sterile fashion  Needle gauge: 21 G.  Approach: anterior  Medications administered: 2 mL lidocaine PF 1% 1 %; 80 mg methylPREDNISolone acetate 80  MG/ML  Patient tolerance: patient tolerated the procedure well with no immediate complications

## 2024-08-14 ENCOUNTER — OFFICE VISIT (OUTPATIENT)
Dept: WOUND CARE | Facility: HOSPITAL | Age: 84
End: 2024-08-14
Payer: MEDICARE

## 2024-08-16 ENCOUNTER — OFFICE VISIT (OUTPATIENT)
Dept: WOUND CARE | Facility: HOSPITAL | Age: 84
End: 2024-08-16
Payer: MEDICARE

## 2024-08-21 ENCOUNTER — OFFICE VISIT (OUTPATIENT)
Dept: WOUND CARE | Facility: HOSPITAL | Age: 84
End: 2024-08-21
Payer: MEDICARE

## 2024-08-28 ENCOUNTER — OFFICE VISIT (OUTPATIENT)
Dept: WOUND CARE | Facility: HOSPITAL | Age: 84
End: 2024-08-28
Payer: MEDICARE

## 2024-09-04 ENCOUNTER — OFFICE VISIT (OUTPATIENT)
Dept: WOUND CARE | Facility: HOSPITAL | Age: 84
End: 2024-09-04
Payer: MEDICARE

## 2024-09-11 ENCOUNTER — OFFICE VISIT (OUTPATIENT)
Dept: WOUND CARE | Facility: HOSPITAL | Age: 84
End: 2024-09-11
Payer: MEDICARE

## 2024-09-18 ENCOUNTER — OFFICE VISIT (OUTPATIENT)
Dept: WOUND CARE | Facility: HOSPITAL | Age: 84
End: 2024-09-18
Payer: MEDICARE

## 2024-09-25 ENCOUNTER — OFFICE VISIT (OUTPATIENT)
Dept: WOUND CARE | Facility: HOSPITAL | Age: 84
End: 2024-09-25
Payer: MEDICARE

## 2024-10-02 ENCOUNTER — OFFICE VISIT (OUTPATIENT)
Dept: WOUND CARE | Facility: HOSPITAL | Age: 84
End: 2024-10-02
Payer: MEDICARE

## 2024-10-02 NOTE — PROGRESS NOTES
RM:________     PCP: Niko Cheatham MD    : 1940  AGE: 84 y.o.  EST PATIENT     REASON FOR VISIT/  CC:        BP Readings from Last 3 Encounters:   24 143/93   24 95/56   24 142/84      Wt Readings from Last 3 Encounters:   24 47.8 kg (105 lb 4.8 oz)   24 49.9 kg (110 lb)   24 52.2 kg (115 lb)        WT: ____________ BP: __________L __________R HR______    CHEST PAIN: _____________    SOA: _____________PALPS: _______________     LIGHTHEADED: ___________FATIGUE: ________________ EDEMA __________    ALLERGIES:Patient has no known allergies. SMOKING HISTORY:  Social History     Tobacco Use    Smoking status: Never     Passive exposure: Never    Smokeless tobacco: Never   Vaping Use    Vaping status: Never Used   Substance Use Topics    Alcohol use: No    Drug use: No     CAFFEINE USE_________________  ALCOHOL ______________________

## 2024-10-14 ENCOUNTER — ANESTHESIA EVENT (OUTPATIENT)
Dept: PAIN MEDICINE | Facility: HOSPITAL | Age: 84
End: 2024-10-14
Payer: MEDICARE

## 2024-10-14 ENCOUNTER — ANESTHESIA (OUTPATIENT)
Dept: PAIN MEDICINE | Facility: HOSPITAL | Age: 84
End: 2024-10-14
Payer: MEDICARE

## 2024-10-14 ENCOUNTER — HOSPITAL ENCOUNTER (OUTPATIENT)
Dept: GENERAL RADIOLOGY | Facility: HOSPITAL | Age: 84
Discharge: HOME | End: 2024-10-14
Payer: MEDICARE

## 2024-10-14 ENCOUNTER — HOSPITAL ENCOUNTER (OUTPATIENT)
Dept: PAIN MEDICINE | Facility: HOSPITAL | Age: 84
Discharge: HOME | End: 2024-10-14
Payer: MEDICARE

## 2024-10-14 VITALS
RESPIRATION RATE: 16 BRPM | DIASTOLIC BLOOD PRESSURE: 105 MMHG | HEART RATE: 80 BPM | OXYGEN SATURATION: 97 % | TEMPERATURE: 96.8 F | SYSTOLIC BLOOD PRESSURE: 149 MMHG

## 2024-10-14 DIAGNOSIS — M48.02 CERVICAL STENOSIS OF SPINAL CANAL: Primary | ICD-10-CM

## 2024-10-14 DIAGNOSIS — R52 PAIN: ICD-10-CM

## 2024-10-14 DIAGNOSIS — M50.13 CERVICAL DISC DISORDER WITH RADICULOPATHY, CERVICOTHORACIC REGION: ICD-10-CM

## 2024-10-14 PROCEDURE — 25010000002 METHYLPREDNISOLONE PER 80 MG: Performed by: ANESTHESIOLOGY

## 2024-10-14 PROCEDURE — 25510000001 IOPAMIDOL 41 % SOLUTION: Performed by: ANESTHESIOLOGY

## 2024-10-14 PROCEDURE — 77003 FLUOROGUIDE FOR SPINE INJECT: CPT

## 2024-10-14 RX ORDER — SODIUM CHLORIDE 0.9 % (FLUSH) 0.9 %
1-10 SYRINGE (ML) INJECTION AS NEEDED
Status: DISCONTINUED | OUTPATIENT
Start: 2024-10-14 | End: 2024-10-15 | Stop reason: HOSPADM

## 2024-10-14 RX ORDER — FENTANYL CITRATE 50 UG/ML
50 INJECTION, SOLUTION INTRAMUSCULAR; INTRAVENOUS AS NEEDED
Status: DISCONTINUED | OUTPATIENT
Start: 2024-10-14 | End: 2024-10-15 | Stop reason: HOSPADM

## 2024-10-14 RX ORDER — IOPAMIDOL 408 MG/ML
12 INJECTION, SOLUTION INTRATHECAL
Status: COMPLETED | OUTPATIENT
Start: 2024-10-14 | End: 2024-10-14

## 2024-10-14 RX ORDER — LIDOCAINE HYDROCHLORIDE 10 MG/ML
1 INJECTION, SOLUTION INFILTRATION; PERINEURAL ONCE AS NEEDED
Status: DISCONTINUED | OUTPATIENT
Start: 2024-10-14 | End: 2024-10-15 | Stop reason: HOSPADM

## 2024-10-14 RX ORDER — MIDAZOLAM HYDROCHLORIDE 1 MG/ML
1 INJECTION INTRAMUSCULAR; INTRAVENOUS AS NEEDED
Status: DISCONTINUED | OUTPATIENT
Start: 2024-10-14 | End: 2024-10-15 | Stop reason: HOSPADM

## 2024-10-14 RX ORDER — METHYLPREDNISOLONE ACETATE 80 MG/ML
80 INJECTION, SUSPENSION INTRA-ARTICULAR; INTRALESIONAL; INTRAMUSCULAR; SOFT TISSUE ONCE
Status: COMPLETED | OUTPATIENT
Start: 2024-10-14 | End: 2024-10-14

## 2024-10-14 RX ADMIN — IOPAMIDOL 10 ML: 408 INJECTION, SOLUTION INTRATHECAL at 08:50

## 2024-10-14 RX ADMIN — METHYLPREDNISOLONE ACETATE 80 MG: 80 INJECTION, SUSPENSION INTRA-ARTICULAR; INTRALESIONAL; INTRAMUSCULAR; SOFT TISSUE at 08:50

## 2024-10-14 NOTE — H&P
[facility]    History and Physical    Patient Name: Bhumi Gimenez  :  1940  MRN:  [id number]  Date of Admission: 10/14/2024    Subjective     Patient is a 84 y.o. female presents with chief complaint of chronic, moderate neck & left scapular pain.  Onset of symptoms was gradual starting several years ago.  Symptoms are associated/aggravated by nothing in particular or activity. Symptoms improve with injection - 75% relief following prior liz.  Pain began to return noticably a few weeks ago. Patient plays golf & is active - liz has helped her return to her routine.  Presents today for liz.  Off anticoagulation.      The following portions of the patients history were reviewed and updated as appropriate: current medications, allergies, past medical history, past surgical history, past family history, past social history, and problem list                Objective     Past Medical History:   Past Medical History:   Diagnosis Date   • Anemia of chronic disorder    • Biliary dyskinesia    • Cervical disc disorder with radiculopathy, cervicothoracic region 2024   • Chronic deep venous thrombosis     chronic bilateral DVT   • Chronic kidney disease    • Chronic pancreatitis     ACUTE PANCREATITIS 2013   • Collar bone fracture    • Diverticulosis    • Duodenal diverticulum 2022    Added automatically from request for surgery [id number]   • Esophageal reflux    • Full dentures    • Hepatomegaly     LIKELY SECONDARY TO ALCOHOL USE   • History of alcohol use     quit    • History of transfusion    • Hypertension    • IBS (irritable bowel syndrome)     with diarrhea   • Ileus, unspecified 2021   • Left foot drop     LEFT FOOD DROP SECONDARY TO PERONEAL NERVE INJURY   • Neck pain    • Nonrheumatic aortic valve stenosis 2023   • Osteoarthritis     OSTEO   • Pancreatic pseudocyst resolved on CT 2016   • Peripheral neuropathy     LEFT LOWER EXTREMITY   • Permanent  atrial fibrillation 08/11/2023   • Personal history of gallstones    • Personal history of malignant neoplasm of skin     S/P REMOVAL FROM LEFT KNEE   • Primary osteoarthritis of both hands    • Rheumatoid arthritis of multiple sites without organ or system involvement with positive rheumatoid factor 05/12/2021   • Takotsubo syndrome     Likely secondary to acute illness in 11/13.  EF as low as 20-25% 11/27/13.  EF 63% by echo 4/14/14.   • Thrombocytopenia      Past Surgical History:   Past Surgical History:   Procedure Laterality Date   • CARDIAC CATHETERIZATION N/A 12/20/2023    Procedure: Coronary angiography;  Surgeon: Myrna Ullao MD;  Location: [facility] CATH INVASIVE LOCATION;  Service: Cardiovascular;  Laterality: N/A;   • CHOLECYSTECTOMY     • COLONOSCOPY N/A 09/08/2022    Procedure: COLONOSCOPY to cecum;  Surgeon: Jhonatan Casey Jr., MD;  Location: [facility] ENDOSCOPY;  Service: General;  Laterality: N/A;  pre: screening  post: normal   • CORNEAL TRANSPLANT     • ENDOSCOPY N/A 09/08/2022    Procedure: ESOPHAGOGASTRODUODENOSCOPY with cold biopsies;  Surgeon: Jhonatan Casey Jr., MD;  Location: [facility] ENDOSCOPY;  Service: General;  Laterality: N/A;  pre: RUQ abdominal pain  post: esophagitis and mild gastritis   • EPIDURAL BLOCK     • MULTIPLE TOOTH EXTRACTIONS      FULL MOUTH TEETH REMOVED   • TOTAL SHOULDER ARTHROPLASTY W/ DISTAL CLAVICLE EXCISION Right 11/16/2017    Procedure: Reverse Total Shoulder Arthroplasty;  Surgeon: Sabino Harley MD;  Location: [facility] OR;  Service:      Family History:   Family History   Problem Relation Age of Onset   • Alcohol abuse Mother    • Other Mother         CORONARY ARTERIOSCLEROSIS   • Cancer Father         30 years ago   • Malig Hyperthermia Neg Hx      Social History:   Social History     Socioeconomic History   • Marital status: Single   Tobacco Use   • Smoking status: Never     Passive exposure: Never   • Smokeless tobacco: Never   Vaping Use   • Vaping  status: Never Used   Substance and Sexual Activity   • Alcohol use: No   • Drug use: No   • Sexual activity: Not Currently       Vital Signs Range for the last 24 hours  Temperature: Temp:  [36 °C (96.8 °F)] 36 °C (96.8 °F)   Temp Source: Temp src: Infrared   BP:     Pulse: Heart Rate:  [98] 98   Respirations: Resp:  [16] 16   SPO2: SpO2:  [99 %] 99 %   O2 Amount (l/min):     O2 Devices Device (Oxygen Therapy): room air   Weight:           --------------------------------------------------------------------------------    Current Outpatient Medications   Medication Sig Dispense Refill   • acetaminophen (TYLENOL) 325 MG tablet Take 2 tablets by mouth Every 4 (Four) Hours As Needed for Mild Pain .     • amLODIPine (NORVASC) 2.5 MG tablet TAKE 1 TABLET BY MOUTH DAILY 90 tablet 1   • apixaban (ELIQUIS) 2.5 MG tablet tablet Take 1 tablet by mouth Every 12 (Twelve) Hours. Resumed on 12/22/2023.  Indications: Atrial Fibrillation     • atorvastatin (LIPITOR) 10 MG tablet TAKE 1 TABLET BY MOUTH DAILY 90 tablet 1   • carvedilol (COREG) 25 MG tablet Take 1 tablet by mouth 2 (Two) Times a Day With Meals. 60 tablet 2   • cyclobenzaprine (FLEXERIL) 10 MG tablet Take 1 tablet by mouth Every 8 (Eight) Hours As Needed for Muscle Spasms. 60 tablet 1   • digoxin (LANOXIN) 125 MCG tablet Take 1 tablet by mouth Daily. 30 tablet 0   • ferrous sulfate (FeroSul) 325 (65 FE) MG tablet Take 1 tablet by mouth Daily With Breakfast. 30 tablet 0   • fluorometholone (FML) 0.1 % ophthalmic suspension SHAKE GENTLY AND INSTILL ONE DROP IN RIGHT EYE BID.  3   • folic acid (FOLVITE) 1 MG tablet Take 1 tablet by mouth Daily.     • furosemide (Lasix) 20 MG tablet Take 1 tablet by mouth Daily. 30 tablet 0   • loteprednol (LOTEMAX) 0.5 % ophthalmic suspension Administer 1 drop to both eyes 2 (Two) Times a Day.     • methotrexate 2.5 MG tablet 7 TABLETS PER WEEK     • ofloxacin (OCUFLOX) 0.3 % ophthalmic solution INSTILL 1 DROP INTO RIGHT EYE 3 TIMES A DAY  FOR 1 WEEK  0   • pantoprazole (PROTONIX) 40 MG EC tablet Take 1 tablet by mouth daily. 90 tablet 3   • potassium chloride (KLOR-CON M20) 20 MEQ CR tablet        No current facility-administered medications for this encounter.       --------------------------------------------------------------------------------  Assessment & Plan      Anesthesia Evaluation     Patient summary reviewed and Nursing notes reviewed   no history of anesthetic complications:                Airway   Mallampati: II  Dental      Pulmonary    (+) ,shortness of breath  Cardiovascular     PT is on anticoagulation therapy    (+) hypertension, valvular problems/murmurs, dysrhythmias, DVT      Neuro/Psych  (+) numbness  GI/Hepatic/Renal/Endo    (+) GERD, liver disease, renal disease-    Musculoskeletal     (+) chronic pain, neck pain  Abdominal    Substance History - negative use     OB/GYN negative ob/gyn ROS         Other   arthritis,   history of cancer           Diagnosis and Plan    Treatment Plan  ASA 3   Patient has had previous injection/procedure with % improvement.   Procedures: Cervical Epidural Steroid Injection(ESTELLE), With fluoroscopy,      Anesthetic plan and risks discussed with patient.      Diagnosis     * Cervical radiculopathy [M54.12]     * Cervical stenosis of spinal canal [M48.02]

## 2024-10-14 NOTE — ANESTHESIA PROCEDURE NOTES
PAIN Epidural block    Pre-sedation assessment completed: 10/14/2024 8:39 AM    Patient reassessed immediately prior to procedure    Patient location during procedure: pain clinic  Start Time: 10/14/2024 8:39 AM  Stop Time: 10/14/2024 8:52 AM  Indication:procedure for pain  Performed By  Anesthesiologist: Melissa Colunga MD  Preanesthetic Checklist  Completed: patient identified, IV checked, site marked, risks and benefits discussed, surgical consent, monitors and equipment checked, pre-op evaluation and timeout performed  Additional Notes  Fluoro used.    Prep:  Pt Position:prone  Sterile Tech:cap, gloves, mask and sterile barrier  Prep:chlorhexidine gluconate and isopropyl alcohol  Monitoring:blood pressure monitoring, continuous pulse oximetry and EKG  Procedure:Sedation: no     Approach:midline  Guidance: fluoroscopy  Location:cervical  Level:C7-T1  Needle Type:Tuohy  Needle Gauge:20  Aspiration:negative  Medications:  Preservative Free Saline:3mL  Isovue:1mL  Comments:Dye spread c/w epidurogram  Dx: cervical radiculopathyDepomedrol:80  Post Assessment:  Dressing:occlusive dressing applied  Pt Tolerance:patient tolerated the procedure well with no apparent complications  Complications:no

## 2024-10-14 NOTE — DISCHARGE INSTRUCTIONS
"Guide To Relieving And Avoiding Neck Pain    Exercise is important to help prevent and treat neck pain.  Good posture, exercise and avoiding injury will help to keep your neck healthy.    When the neck is strained or over worked symptoms may include headache, upper back pain, shoulder pain or arm pain.  Numbness or tingling in the fingers, dizziness or nausea may also occur.    Posture:    Avoid slumping over a desk.  Raise your work (including computer) to eye level to avoid bending at the neck.      Change Position Often:  Changing position prevents overuse of particular muscles.    Sleep On One Pillow:  Using to many pillows or to large of a pillow causes a \"kink\" in you neck.      Move and Exercise:  Living an active lifestyle is an important part of staying healthy.  Be sure to include the exercise to follow specifically for your neck.                    Range of Motion Exercises:  Do these exercises three times a day.  If you experience increased pain stop and contact your physician.  All exercises can be performed sitting or standing.        1.    2.   3.    1.  Place both hands behind you neck.  Gently tilt your neck backward so that you are looking at the ceiling.  Hold for a count of 10.    2.  Look straight facing forward.  Slowly tip your ear toward your right shoulder.  Do not force the motion.  Hold for a count of 10.  Bring head back to starting position and repeat to left side.    3.  Look straight facing forward.  Gently turn your head to the right.  Do not force the motion.  Hold for a count of 10.  Bring head back to starting position and repeat to the left side.    Exercises To Strengthen Muscles:  1.  Look straight facing forward.  Relax your shoulders.  Raise both shoulders toward your ears.  Hold for 3 seconds.       1.       2.   3.      1.  Look straight facing forward.  Relax your shoulders.  Raise both shoulders toward     2.  Raise your ars to your side and bend your elbows.  Squeeze " shoulder blades together as you rotate your arms outward.  Hold for 5 seconds.    3.  Look straight facing forward.  Pull your head straight back.  Do not tip or move your jaw.  Hold for 5 seconds.   EPIDURAL STEROID INJECTION          An epidural steroid injection is a shot of steroid medicine and numbing medicine that is given into the space between the spinal cord and the bones of the back (epidural space).  The injection helps relieve pain by an irritated or swollen nerve root.    TELL YOUR HEALTH CARE PROVIDER ABOUT:  Any allergies you have  All medicines you are taking including any over the counter medicines  Any blood disorders you have  Any surgeries you have had  Any medical conditions you have  Whether you are pregnant or may be pregnant    WHAT ARE THE RISK?  Generally, this is a safe procedure. However,problems may occur, including  Headache  Bleeding  Infection  Allergic Reaction  Nerve Damage    WHAT CAN I EXPECT AFTER THE PROCEDURE?    INJECTION SITE  Remove the Band-Aid/s after 24 hours  Check your injection site every day for signs of infection.  Check for:             Redness             Bleeding (small amt is normal)             Warmth             Pus or bad odor  Some numbness may be experienced for several hours following the procedure.  Avoid using heat on the injection site for 24 hours. You may use ice intermittently if needed by placing a         towel between your skin and the ice bag and using the ice for 20 minutes 2-3 times a day.  Do not take baths, swim or use a hot tub for 24 hours.    ACTIVITY  No strenuous activity for 24 hours then return to normal activity as tolerated.  If your leg is numb, no driving until full sensation and strength has returned.    GENERAL INSTRUCTIONS:  The injection site may feel numb, use ice with caution if numbness is present and no heat for 24 hours or until numbness is gone.   If you have numbness or weakness in your arm or leg, use those areas with  caution until normal sensation returns.  It is not uncommon to notice an increase in discomfort or a change in the location of discomfort for 3-4 days after the procedure.  If discomfort is noticed at the injection site, ice may be            applied to that area for 20 min 2-3 times a day.  Take the pain medicine your physician has prescribed or over the counter pain relievers as long as you do not have any contraindications.  If you are a diabetic, monitor your blood sugar closely.  The steroids used in your procedure may increase your blood sugar level up to 36 hours after the injection.  If your blood sugar is greater than 250, call the physician that helps you monitor your blood sugar.  Keep all follow-up visits as scheduled by your health care provider. This is important.    CONTACT OUR OFFICE IF:  You have any of these signs of infection            -Redness, swelling, or warmth around your injection site.            -Fluid or blood coming from your injection site (small amt of blood is normal)            -Pus or a bad odor from your injection site            -A fever  You develop a severe headache or a stiff neck  You lose control of your bladder or bowel movements      [facility]IN [facility]NA[facility]EMENT [facility] HOURS   Monday-Friday 7:30 am. - 4:00 pm.  For any problem related to your procedure we can be reached at [phone].  If you experience an emergency with your procedure, call [phone] or go to the emergency room.

## 2024-10-16 ENCOUNTER — OFFICE VISIT (OUTPATIENT)
Dept: WOUND CARE | Facility: HOSPITAL | Age: 84
End: 2024-10-16
Payer: MEDICARE

## 2024-10-21 ENCOUNTER — OFFICE VISIT (OUTPATIENT)
Dept: CARDIOLOGY | Facility: CLINIC | Age: 84
End: 2024-10-21
Payer: MEDICARE

## 2024-10-21 ENCOUNTER — HOSPITAL ENCOUNTER (OUTPATIENT)
Dept: CARDIOLOGY | Facility: HOSPITAL | Age: 84
Discharge: HOME | End: 2024-10-21
Admitting: NURSE PRACTITIONER
Payer: MEDICARE

## 2024-10-21 VITALS
BODY MASS INDEX: 19.42 KG/M2 | HEIGHT: 63 IN | WEIGHT: 109.6 LBS | DIASTOLIC BLOOD PRESSURE: 84 MMHG | SYSTOLIC BLOOD PRESSURE: 116 MMHG | HEART RATE: 70 BPM

## 2024-10-21 VITALS
BODY MASS INDEX: 18.61 KG/M2 | HEIGHT: 63 IN | SYSTOLIC BLOOD PRESSURE: 122 MMHG | WEIGHT: 105 LBS | HEART RATE: 82 BPM | DIASTOLIC BLOOD PRESSURE: 80 MMHG

## 2024-10-21 DIAGNOSIS — I35.0 NONRHEUMATIC AORTIC VALVE STENOSIS: ICD-10-CM

## 2024-10-21 DIAGNOSIS — I36.1 NONRHEUMATIC TRICUSPID VALVE REGURGITATION: ICD-10-CM

## 2024-10-21 DIAGNOSIS — I10 ESSENTIAL HYPERTENSION: ICD-10-CM

## 2024-10-21 DIAGNOSIS — I51.81 TAKOTSUBO CARDIOMYOPATHY: ICD-10-CM

## 2024-10-21 DIAGNOSIS — G89.29 CHRONIC SCAPULAR PAIN: ICD-10-CM

## 2024-10-21 DIAGNOSIS — I48.21 PERMANENT ATRIAL FIBRILLATION: ICD-10-CM

## 2024-10-21 DIAGNOSIS — I25.10 NONOCCLUSIVE CORONARY ATHEROSCLEROSIS OF NATIVE CORONARY ARTERY: Primary | ICD-10-CM

## 2024-10-21 DIAGNOSIS — I34.0 NONRHEUMATIC MITRAL VALVE REGURGITATION: ICD-10-CM

## 2024-10-21 DIAGNOSIS — M89.8X1 CHRONIC SCAPULAR PAIN: ICD-10-CM

## 2024-10-21 PROBLEM — I48.91 ATRIAL FIBRILLATION WITH RVR: Status: RESOLVED | Noted: 2024-06-30 | Resolved: 2024-10-21

## 2024-10-21 PROBLEM — R06.09 DYSPNEA ON EXERTION: Status: RESOLVED | Noted: 2023-12-08 | Resolved: 2024-10-21

## 2024-10-21 LAB
AORTIC DIMENSIONLESS INDEX: 0.2 (DI)
ASCENDING AORTA: 2.5 CM
BH CV ECHO MEAS - ACS: 0.88 CM
BH CV ECHO MEAS - AO MAX PG: 35.3 MMHG
BH CV ECHO MEAS - AO MEAN PG: 20.8 MMHG
BH CV ECHO MEAS - AO ROOT AREA (BSA CORRECTED): 1.8 CM2
BH CV ECHO MEAS - AO ROOT DIAM: 2.7 CM
BH CV ECHO MEAS - AO V2 MAX: 297 CM/SEC
BH CV ECHO MEAS - AO V2 VTI: 64.3 CM
BH CV ECHO MEAS - AVA(I,D): 0.67 CM2
BH CV ECHO MEAS - EDV(CUBED): 42.9 ML
BH CV ECHO MEAS - EDV(MOD-SP2): 65 ML
BH CV ECHO MEAS - EDV(MOD-SP4): 59 ML
BH CV ECHO MEAS - EF(MOD-BP): 63.7 %
BH CV ECHO MEAS - EF(MOD-SP2): 66.2 %
BH CV ECHO MEAS - EF(MOD-SP4): 55.9 %
BH CV ECHO MEAS - ESV(CUBED): 8.4 ML
BH CV ECHO MEAS - ESV(MOD-SP2): 22 ML
BH CV ECHO MEAS - ESV(MOD-SP4): 26 ML
BH CV ECHO MEAS - FS: 42 %
BH CV ECHO MEAS - IVS/LVPW: 1 CM
BH CV ECHO MEAS - IVSD: 1.2 CM
BH CV ECHO MEAS - LAT PEAK E' VEL: 13.7 CM/SEC
BH CV ECHO MEAS - LV DIASTOLIC VOL/BSA (35-75): 40.1 CM2
BH CV ECHO MEAS - LV MASS(C)D: 135.8 GRAMS
BH CV ECHO MEAS - LV MAX PG: 2.25 MMHG
BH CV ECHO MEAS - LV MEAN PG: 1 MMHG
BH CV ECHO MEAS - LV SYSTOLIC VOL/BSA (12-30): 17.7 CM2
BH CV ECHO MEAS - LV V1 MAX: 75 CM/SEC
BH CV ECHO MEAS - LV V1 VTI: 12.6 CM
BH CV ECHO MEAS - LVIDD: 3.5 CM
BH CV ECHO MEAS - LVIDS: 2.03 CM
BH CV ECHO MEAS - LVOT AREA: 3.4 CM2
BH CV ECHO MEAS - LVOT DIAM: 2.08 CM
BH CV ECHO MEAS - LVPWD: 1.2 CM
BH CV ECHO MEAS - MED PEAK E' VEL: 14.6 CM/SEC
BH CV ECHO MEAS - MV DEC SLOPE: 437.2 CM/SEC2
BH CV ECHO MEAS - MV DEC TIME: 0.15 SEC
BH CV ECHO MEAS - MV E MAX VEL: 99.4 CM/SEC
BH CV ECHO MEAS - MV MAX PG: 6 MMHG
BH CV ECHO MEAS - MV MEAN PG: 1.58 MMHG
BH CV ECHO MEAS - MV P1/2T: 78.2 MSEC
BH CV ECHO MEAS - MV V2 VTI: 32.6 CM
BH CV ECHO MEAS - MVA(P1/2T): 2.8 CM2
BH CV ECHO MEAS - MVA(VTI): 1.32 CM2
BH CV ECHO MEAS - PA ACC TIME: 0.06 SEC
BH CV ECHO MEAS - PA V2 MAX: 80.1 CM/SEC
BH CV ECHO MEAS - PULM DIAS VEL: 38.1 CM/SEC
BH CV ECHO MEAS - PULM S/D: 0.73
BH CV ECHO MEAS - PULM SYS VEL: 27.9 CM/SEC
BH CV ECHO MEAS - QP/QS: 0.49
BH CV ECHO MEAS - RAP SYSTOLE: 3 MMHG
BH CV ECHO MEAS - RV MAX PG: 0.86 MMHG
BH CV ECHO MEAS - RV V1 MAX: 46.3 CM/SEC
BH CV ECHO MEAS - RV V1 VTI: 7.8 CM
BH CV ECHO MEAS - RVOT DIAM: 1.86 CM
BH CV ECHO MEAS - RVSP: 32.5 MMHG
BH CV ECHO MEAS - SUP REN AO DIAM: 1.9 CM
BH CV ECHO MEAS - SV(LVOT): 43 ML
BH CV ECHO MEAS - SV(MOD-SP2): 43 ML
BH CV ECHO MEAS - SV(MOD-SP4): 33 ML
BH CV ECHO MEAS - SV(RVOT): 21.2 ML
BH CV ECHO MEAS - SVI(LVOT): 29.3 ML/M2
BH CV ECHO MEAS - SVI(MOD-SP2): 29.2 ML/M2
BH CV ECHO MEAS - SVI(MOD-SP4): 22.4 ML/M2
BH CV ECHO MEAS - TAPSE (>1.6): 1.87 CM
BH CV ECHO MEAS - TR MAX PG: 29.5 MMHG
BH CV ECHO MEAS - TR MAX VEL: 271.5 CM/SEC
BH CV ECHO MEASUREMENTS AVERAGE E/E' RATIO: 7.02
BH CV XLRA - RV BASE: 3.5 CM
BH CV XLRA - RV LENGTH: 6 CM
BH CV XLRA - RV MID: 3.3 CM
BH CV XLRA - TDI S': 13.2 CM/SEC
LEFT ATRIUM VOLUME INDEX: 58 ML/M2
SINUS: 2.7 CM
STJ: 2.35 CM

## 2024-10-21 PROCEDURE — 1159F MED LIST DOCD IN RCRD: CPT | Performed by: INTERNAL MEDICINE

## 2024-10-21 PROCEDURE — 93306 TTE W/DOPPLER COMPLETE: CPT | Performed by: INTERNAL MEDICINE

## 2024-10-21 PROCEDURE — 93306 TTE W/DOPPLER COMPLETE: CPT

## 2024-10-21 PROCEDURE — 1160F RVW MEDS BY RX/DR IN RCRD: CPT | Performed by: INTERNAL MEDICINE

## 2024-10-21 PROCEDURE — 99213 OFFICE O/P EST LOW 20 MIN: CPT | Performed by: INTERNAL MEDICINE

## 2024-10-21 PROCEDURE — 3079F DIAST BP 80-89 MM HG: CPT | Performed by: INTERNAL MEDICINE

## 2024-10-21 PROCEDURE — 3074F SYST BP LT 130 MM HG: CPT | Performed by: INTERNAL MEDICINE

## 2024-10-21 NOTE — PROGRESS NOTES
Date of Office Visit: 10/21/24  Encounter Provider: Arcadio Miguel MD  Place of Service: [facility] CARDIOLOGY  Patient Name: Bhumi Gimenez  :1940    Chief Complaint   Patient presents with    Atrial Fibrillation   :     HPI:     Ms. Gimenez is 84 y.o. and presents today in follow up. I have reviewed prior notes and there are no changes except for any new updates described below. I have also reviewed any information entered into the medical record by the patient or by ancillary staff.     She has permanent atrial fibrillation; she has been treated with apixaban, carvedilol, and digoxin. She has a remote history of Takotsubo cardiomyopathy. She has aortic stenosis (moderate by TTE today). She has a history of hypertension.     Due to persistent dyspnea, she underwent coronary angiography in 2023; this showed mild nonobstructive CAD.     Her CV status is stable. She has chronic dyspnea with inclines but not with flat ground. She denies PND, orthopnea, or leg swelling.  She denies chest pain or palpitations.  She denies syncope or near syncope.    Past Medical History:   Diagnosis Date    Anemia of chronic disorder     Biliary dyskinesia     Cervical disc disorder with radiculopathy, cervicothoracic region 2024    Chronic deep venous thrombosis     chronic bilateral DVT    Chronic kidney disease     Chronic pancreatitis     ACUTE PANCREATITIS 2013    Collar bone fracture     Diverticulosis     Duodenal diverticulum 2022    Added automatically from request for surgery [id number]    Esophageal reflux     Full dentures     Hepatomegaly     LIKELY SECONDARY TO ALCOHOL USE    History of alcohol use     quit     History of transfusion     Hypertension     IBS (irritable bowel syndrome)     with diarrhea    Ileus, unspecified 2021    Left foot drop     LEFT FOOD DROP SECONDARY TO PERONEAL NERVE INJURY    Neck pain     Nonrheumatic aortic valve stenosis  08/11/2023    Osteoarthritis     OSTEO    Pancreatic pseudocyst resolved on CT 5/13/21 12/05/2016    Peripheral neuropathy     LEFT LOWER EXTREMITY    Permanent atrial fibrillation 08/11/2023    Personal history of gallstones     Personal history of malignant neoplasm of skin     S/P REMOVAL FROM LEFT KNEE    Primary osteoarthritis of both hands     Rheumatoid arthritis of multiple sites without organ or system involvement with positive rheumatoid factor 05/12/2021    Takotsubo syndrome     Likely secondary to acute illness in 11/13.  EF as low as 20-25% 11/27/13.  EF 63% by echo 4/14/14.    Thrombocytopenia        Past Surgical History:   Procedure Laterality Date    CARDIAC CATHETERIZATION N/A 12/20/2023    Procedure: Coronary angiography;  Surgeon: Myrna Ulloa MD;  Location: [facility] CATH INVASIVE LOCATION;  Service: Cardiovascular;  Laterality: N/A;    CHOLECYSTECTOMY      COLONOSCOPY N/A 09/08/2022    Procedure: COLONOSCOPY to cecum;  Surgeon: Jhonatan Casey Jr., MD;  Location: [facility] ENDOSCOPY;  Service: General;  Laterality: N/A;  pre: screening  post: normal    CORNEAL TRANSPLANT      ENDOSCOPY N/A 09/08/2022    Procedure: ESOPHAGOGASTRODUODENOSCOPY with cold biopsies;  Surgeon: Jhonatan Casey Jr., MD;  Location: [facility] ENDOSCOPY;  Service: General;  Laterality: N/A;  pre: RUQ abdominal pain  post: esophagitis and mild gastritis    EPIDURAL BLOCK      MULTIPLE TOOTH EXTRACTIONS      FULL MOUTH TEETH REMOVED    TOTAL SHOULDER ARTHROPLASTY W/ DISTAL CLAVICLE EXCISION Right 11/16/2017    Procedure: Reverse Total Shoulder Arthroplasty;  Surgeon: Sabino Harley MD;  Location: [facility] OR;  Service:        Social History     Socioeconomic History    Marital status: Single   Tobacco Use    Smoking status: Never     Passive exposure: Never    Smokeless tobacco: Never   Vaping Use    Vaping status: Never Used   Substance and Sexual Activity    Alcohol use: No    Drug use: No    Sexual activity: Not  Currently       Family History   Problem Relation Age of Onset    Alcohol abuse Mother     Other Mother         CORONARY ARTERIOSCLEROSIS    Cancer Father         30 years ago    Malig Hyperthermia Neg Hx        Review of Systems   Cardiovascular:  Negative for chest pain.   Respiratory:  Positive for shortness of breath.    Skin:  Positive for poor wound healing.   All other systems reviewed and are negative.      No Known Allergies      Current Outpatient Medications:     acetaminophen (TYLENOL) 325 MG tablet, Take 2 tablets by mouth Every 4 (Four) Hours As Needed for Mild Pain ., Disp: , Rfl:     amLODIPine (NORVASC) 2.5 MG tablet, TAKE 1 TABLET BY MOUTH DAILY, Disp: 90 tablet, Rfl: 1    apixaban (ELIQUIS) 2.5 MG tablet tablet, Take 1 tablet by mouth Every 12 (Twelve) Hours. Resumed on 12/22/2023.  Indications: Atrial Fibrillation, Disp: , Rfl:     atorvastatin (LIPITOR) 10 MG tablet, TAKE 1 TABLET BY MOUTH DAILY, Disp: 90 tablet, Rfl: 1    carvedilol (COREG) 25 MG tablet, Take 1 tablet by mouth 2 (Two) Times a Day With Meals., Disp: 60 tablet, Rfl: 2    cyclobenzaprine (FLEXERIL) 10 MG tablet, Take 1 tablet by mouth Every 8 (Eight) Hours As Needed for Muscle Spasms., Disp: 60 tablet, Rfl: 1    digoxin (LANOXIN) 125 MCG tablet, Take 1 tablet by mouth Daily., Disp: 30 tablet, Rfl: 0    ferrous sulfate (FeroSul) 325 (65 FE) MG tablet, Take 1 tablet by mouth Daily With Breakfast., Disp: 30 tablet, Rfl: 0    fluorometholone (FML) 0.1 % ophthalmic suspension, SHAKE GENTLY AND INSTILL ONE DROP IN RIGHT EYE BID., Disp: , Rfl: 3    folic acid (FOLVITE) 1 MG tablet, Take 1 tablet by mouth Daily., Disp: , Rfl:     furosemide (Lasix) 20 MG tablet, Take 1 tablet by mouth Daily., Disp: 30 tablet, Rfl: 0    loteprednol (LOTEMAX) 0.5 % ophthalmic suspension, Administer 1 drop to both eyes 2 (Two) Times a Day., Disp: , Rfl:     methotrexate 2.5 MG tablet, 7 TABLETS PER WEEK, Disp: , Rfl:     ofloxacin (OCUFLOX) 0.3 % ophthalmic  "solution, INSTILL 1 DROP INTO RIGHT EYE 3 TIMES A DAY FOR 1 WEEK, Disp: , Rfl: 0    pantoprazole (PROTONIX) 40 MG EC tablet, Take 1 tablet by mouth daily., Disp: 90 tablet, Rfl: 3    potassium chloride (KLOR-CON M20) 20 MEQ CR tablet, Daily., Disp: , Rfl:       Objective:     Vitals:    10/21/24 1143   BP: 116/84   BP Location: Left arm   Pulse: 70   Weight: 49.7 kg (109 lb 9.6 oz)   Height: 160 cm (63\")       Body mass index is 19.41 kg/m².    Vitals reviewed.   Constitutional:       Appearance: Underweight.   Eyes:      Conjunctiva/sclera: Conjunctivae normal.   HENT:      Head: Normocephalic.      Nose: Nose normal.   Neck:      Thyroid: Thyroid normal.      Vascular: No JVD. JVD normal.      Lymphadenopathy: No cervical adenopathy.   Pulmonary:      Effort: Pulmonary effort is normal.      Breath sounds: Normal breath sounds.   Cardiovascular:      Normal rate. Irregularly irregular rhythm.      Murmurs: There is a grade 1/6 systolic murmur.   Pulses:     Intact distal pulses.   Edema:     Peripheral edema absent.   Abdominal:      Palpations: Abdomen is soft.      Tenderness: There is no abdominal tenderness.   Musculoskeletal: Normal range of motion. Skin:     General: Skin is warm and dry.   Neurological:      General: No focal deficit present.      Mental Status: Alert and oriented to person, place, and time.      Cranial Nerves: No cranial nerve deficit.   Psychiatric:         Behavior: Behavior normal.         Thought Content: Thought content normal.         Judgment: Judgment normal.         Procedures      Assessment:       Diagnosis Plan   1. Nonocclusive coronary atherosclerosis of native coronary artery        2. Takotsubo cardiomyopathy        3. Permanent atrial fibrillation        4. Nonrheumatic aortic valve stenosis        5. Essential hypertension        6. Chronic scapular pain                 Plan:     1. In December 2023, she was noted to have mild nonobstructive CAD. She is not on aspirin as " she's anticoagulated. She's on atorvastatin. She has chronic exertional dyspnea which is non-cardiac.     2. This resolved completely. She's on carvedilol.     3.  She is rate controlled on carvedilol and digoxin. She is on apixaban. Dr Cheatham follows her digoxin level in his office.     4.  An echo today shows moderate aortic stenosis, which is unchanged from last year's study.  We will reevaluate this in one year.     5. Her BP is well controlled.    6. She has non-cardiac scapular pain; it's musculoskeletal.     Sincerely,       Arcadio Miguel MD

## 2024-10-23 ENCOUNTER — OFFICE VISIT (OUTPATIENT)
Dept: WOUND CARE | Facility: HOSPITAL | Age: 84
End: 2024-10-23
Payer: MEDICARE

## 2024-10-30 ENCOUNTER — OFFICE VISIT (OUTPATIENT)
Dept: WOUND CARE | Facility: HOSPITAL | Age: 84
End: 2024-10-30
Payer: MEDICARE

## 2024-10-30 PROCEDURE — 17250 CHEM CAUT OF GRANLTJ TISSUE: CPT

## 2024-11-06 ENCOUNTER — OFFICE VISIT (OUTPATIENT)
Dept: WOUND CARE | Facility: HOSPITAL | Age: 84
End: 2024-11-06
Payer: MEDICARE

## 2024-11-13 ENCOUNTER — OFFICE VISIT (OUTPATIENT)
Dept: WOUND CARE | Facility: HOSPITAL | Age: 84
End: 2024-11-13
Payer: MEDICARE

## 2024-11-15 RX ORDER — DIGOXIN 125 MCG
125 TABLET ORAL 3 TIMES WEEKLY
Start: 2024-11-15

## 2024-11-17 ENCOUNTER — APPOINTMENT (OUTPATIENT)
Dept: GENERAL RADIOLOGY | Facility: HOSPITAL | Age: 84
End: 2024-11-17
Payer: MEDICARE

## 2024-11-17 ENCOUNTER — HOSPITAL ENCOUNTER (EMERGENCY)
Facility: HOSPITAL | Age: 84
Discharge: HOME | End: 2024-11-17
Attending: EMERGENCY MEDICINE | Admitting: EMERGENCY MEDICINE
Payer: MEDICARE

## 2024-11-17 VITALS
HEIGHT: 63 IN | DIASTOLIC BLOOD PRESSURE: 77 MMHG | TEMPERATURE: 98.2 F | WEIGHT: 110 LBS | SYSTOLIC BLOOD PRESSURE: 121 MMHG | HEART RATE: 84 BPM | OXYGEN SATURATION: 97 % | BODY MASS INDEX: 19.49 KG/M2 | RESPIRATION RATE: 16 BRPM

## 2024-11-17 DIAGNOSIS — S00.81XA ABRASION OF CHIN, INITIAL ENCOUNTER: ICD-10-CM

## 2024-11-17 DIAGNOSIS — S80.212A ABRASION OF LEFT KNEE, INITIAL ENCOUNTER: ICD-10-CM

## 2024-11-17 DIAGNOSIS — S52.125A CLOSED NONDISPLACED FRACTURE OF HEAD OF LEFT RADIUS, INITIAL ENCOUNTER: Primary | ICD-10-CM

## 2024-11-17 PROCEDURE — 73080 X-RAY EXAM OF ELBOW: CPT

## 2024-11-17 PROCEDURE — 99283 EMERGENCY DEPT VISIT LOW MDM: CPT

## 2024-11-17 PROCEDURE — 90471 IMMUNIZATION ADMIN: CPT | Performed by: EMERGENCY MEDICINE

## 2024-11-17 PROCEDURE — 73090 X-RAY EXAM OF FOREARM: CPT

## 2024-11-17 PROCEDURE — 25010000002 TETANUS-DIPHTH-ACELL PERTUSSIS 5-2.5-18.5 LF-MCG/0.5 SUSPENSION PREFILLED SYRINGE: Performed by: EMERGENCY MEDICINE

## 2024-11-17 PROCEDURE — 90715 TDAP VACCINE 7 YRS/> IM: CPT | Performed by: EMERGENCY MEDICINE

## 2024-11-17 RX ORDER — HYDROCODONE BITARTRATE AND ACETAMINOPHEN 5; 325 MG/1; MG/1
1 TABLET ORAL EVERY 6 HOURS PRN
Status: DISCONTINUED | OUTPATIENT
Start: 2024-11-17 | End: 2024-11-18 | Stop reason: HOSPADM

## 2024-11-17 RX ORDER — HYDROCODONE BITARTRATE AND ACETAMINOPHEN 5; 325 MG/1; MG/1
1 TABLET ORAL EVERY 6 HOURS PRN
Qty: 10 TABLET | Refills: 0 | Status: SHIPPED | OUTPATIENT
Start: 2024-11-17

## 2024-11-17 RX ADMIN — TETANUS TOXOID, REDUCED DIPHTHERIA TOXOID AND ACELLULAR PERTUSSIS VACCINE, ADSORBED 0.5 ML: 5; 2.5; 8; 8; 2.5 SUSPENSION INTRAMUSCULAR at 20:57

## 2024-11-17 RX ADMIN — HYDROCODONE BITARTRATE AND ACETAMINOPHEN 1 TABLET: 5; 325 TABLET ORAL at 22:41

## 2024-11-18 ENCOUNTER — TELEPHONE (OUTPATIENT)
Dept: ORTHOPEDIC SURGERY | Facility: CLINIC | Age: 84
End: 2024-11-18

## 2024-11-18 NOTE — ED TRIAGE NOTES
Pt to ED with c/o a fall this AM, injury to L forearm. Pt states she tripped over the curb outside.  Pt denies hitting head, but has a hematoma on L side of chin. Pt states she does take Eliquis. A&Ox4 in triage, ambulatory to triage.
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
## 2024-11-20 ENCOUNTER — OFFICE VISIT (OUTPATIENT)
Dept: WOUND CARE | Facility: HOSPITAL | Age: 84
End: 2024-11-20
Payer: MEDICARE

## 2024-11-20 ENCOUNTER — OFFICE VISIT (OUTPATIENT)
Dept: ORTHOPEDIC SURGERY | Facility: CLINIC | Age: 84
End: 2024-11-20
Payer: MEDICARE

## 2024-11-20 VITALS — BODY MASS INDEX: 18.95 KG/M2 | WEIGHT: 111 LBS | TEMPERATURE: 98.6 F | HEIGHT: 64 IN

## 2024-11-20 DIAGNOSIS — S52.125A CLOSED NONDISPLACED FRACTURE OF HEAD OF LEFT RADIUS, INITIAL ENCOUNTER: Primary | ICD-10-CM

## 2024-11-20 NOTE — PROGRESS NOTES
History & Physical       Patient: Bhumi Gimenez    YOB: 1940    Medical Record Number: [id number]    Chief Complaints: Left elbow injury    History of Present Illness: 84 y.o. female presents for evaluation of the left elbow.  The injury was sustained in a fall this past weekend.  She landed on her outstretched left upper extremity.  She was seen in the emergency room and diagnosed with a radial head fracture.  She was compliant with wearing the sling for the first few days but for the last day or so, she says that she has largely been out of the sling.  She says that she is able to move the elbow much better.  Current pain is mild.  Most of the pain seems to shoot from the outside of the elbow down into her forearm.  Denies any other injuries or complaints.    Allergies: No Known Allergies    Home Medications:      Current Outpatient Medications:     acetaminophen (TYLENOL) 325 MG tablet, Take 2 tablets by mouth Every 4 (Four) Hours As Needed for Mild Pain ., Disp: , Rfl:     amLODIPine (NORVASC) 2.5 MG tablet, TAKE 1 TABLET BY MOUTH DAILY, Disp: 90 tablet, Rfl: 1    apixaban (ELIQUIS) 2.5 MG tablet tablet, Take 1 tablet by mouth Every 12 (Twelve) Hours. Resumed on 12/22/2023.  Indications: Atrial Fibrillation, Disp: , Rfl:     atorvastatin (LIPITOR) 10 MG tablet, TAKE 1 TABLET BY MOUTH DAILY, Disp: 90 tablet, Rfl: 1    carvedilol (COREG) 25 MG tablet, Take 1 tablet by mouth 2 (Two) Times a Day With Meals., Disp: 60 tablet, Rfl: 2    cyclobenzaprine (FLEXERIL) 10 MG tablet, Take 1 tablet by mouth Every 8 (Eight) Hours As Needed for Muscle Spasms., Disp: 60 tablet, Rfl: 1    digoxin (LANOXIN) 125 MCG tablet, Take 1 tablet by mouth 3 (Three) Times a Week., Disp: , Rfl:     ferrous sulfate (FeroSul) 325 (65 FE) MG tablet, Take 1 tablet by mouth Daily With Breakfast., Disp: 30 tablet, Rfl: 0    fluorometholone (FML) 0.1 % ophthalmic suspension, SHAKE GENTLY AND INSTILL ONE DROP IN RIGHT EYE BID.,  Disp: , Rfl: 3    folic acid (FOLVITE) 1 MG tablet, Take 1 tablet by mouth Daily., Disp: , Rfl:     furosemide (Lasix) 20 MG tablet, Take 1 tablet by mouth Daily., Disp: 30 tablet, Rfl: 0    HYDROcodone-acetaminophen (NORCO) 5-325 MG per tablet, Take 1 tablet by mouth Every 6 (Six) Hours As Needed for Moderate Pain., Disp: 10 tablet, Rfl: 0    loteprednol (LOTEMAX) 0.5 % ophthalmic suspension, Administer 1 drop to both eyes 2 (Two) Times a Day., Disp: , Rfl:     methotrexate 2.5 MG tablet, 7 TABLETS PER WEEK, Disp: , Rfl:     ofloxacin (OCUFLOX) 0.3 % ophthalmic solution, INSTILL 1 DROP INTO RIGHT EYE 3 TIMES A DAY FOR 1 WEEK, Disp: , Rfl: 0    pantoprazole (PROTONIX) 40 MG EC tablet, Take 1 tablet by mouth daily., Disp: 90 tablet, Rfl: 3    potassium chloride (KLOR-CON M20) 20 MEQ CR tablet, Daily., Disp: , Rfl:     Past Medical History:   Diagnosis Date    Anemia of chronic disorder     Biliary dyskinesia     Cervical disc disorder with radiculopathy, cervicothoracic region 07/04/2024    Chronic deep venous thrombosis     chronic bilateral DVT    Chronic kidney disease     Chronic pancreatitis     ACUTE PANCREATITIS 11/20/2013    Collar bone fracture     Diverticulosis     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    Added automatically from request for surgery [id number]    Esophageal reflux     Full dentures     Hepatomegaly     LIKELY SECONDARY TO ALCOHOL USE    History of alcohol use     quit 11/13    History of transfusion     Hypertension     IBS (irritable bowel syndrome)     with diarrhea    Ileus, unspecified 05/14/2021    Left foot drop     LEFT FOOD DROP SECONDARY TO PERONEAL NERVE INJURY    Neck pain     Nonrheumatic aortic valve stenosis 08/11/2023    Osteoarthritis     OSTEO    Pancreatic pseudocyst resolved on CT 5/13/21 12/05/2016    Peripheral neuropathy     LEFT LOWER EXTREMITY    Permanent atrial fibrillation 08/11/2023    Personal history of gallstones     Personal history of malignant neoplasm of skin      S/P REMOVAL FROM LEFT KNEE    Primary osteoarthritis of both hands     Rheumatoid arthritis of multiple sites without organ or system involvement with positive rheumatoid factor 05/12/2021    Takotsubo syndrome     Likely secondary to acute illness in 11/13.  EF as low as 20-25% 11/27/13.  EF 63% by echo 4/14/14.    Thrombocytopenia           Past Surgical History:   Procedure Laterality Date    CARDIAC CATHETERIZATION N/A 12/20/2023    Procedure: Coronary angiography;  Surgeon: Myrna Ulloa MD;  Location: [facility] CATH INVASIVE LOCATION;  Service: Cardiovascular;  Laterality: N/A;    CHOLECYSTECTOMY      COLONOSCOPY N/A 09/08/2022    Procedure: COLONOSCOPY to cecum;  Surgeon: Jhonatan Casey Jr., MD;  Location: [facility] ENDOSCOPY;  Service: General;  Laterality: N/A;  pre: screening  post: normal    CORNEAL TRANSPLANT      ENDOSCOPY N/A 09/08/2022    Procedure: ESOPHAGOGASTRODUODENOSCOPY with cold biopsies;  Surgeon: Jhonatan Casey Jr., MD;  Location: [facility] ENDOSCOPY;  Service: General;  Laterality: N/A;  pre: RUQ abdominal pain  post: esophagitis and mild gastritis    EPIDURAL BLOCK      MULTIPLE TOOTH EXTRACTIONS      FULL MOUTH TEETH REMOVED    TOTAL SHOULDER ARTHROPLASTY W/ DISTAL CLAVICLE EXCISION Right 11/16/2017    Procedure: Reverse Total Shoulder Arthroplasty;  Surgeon: Sabino Harley MD;  Location: [facility] OR;  Service:           Social History     Occupational History    Occupation: RETIRED   Tobacco Use    Smoking status: Never     Passive exposure: Never    Smokeless tobacco: Never   Vaping Use    Vaping status: Never Used   Substance and Sexual Activity    Alcohol use: No    Drug use: No    Sexual activity: Not Currently      Social History     Social History Narrative    Pt drinks one pot of coffee daily.           Family History   Problem Relation Age of Onset    Alcohol abuse Mother     Other Mother         CORONARY ARTERIOSCLEROSIS    Cancer Father         30 years ago    Es  "Hyperthermia Neg Hx        Review of Systems:      Constitutional: Denies fever, shaking or chills   Eyes: Denies change in visual acuity   HEENT: Denies nasal congestion or sore throat   Respiratory: Denies cough or shortness of breath   Cardiovascular: Denies chest pain or edema  Endocrine: Denies tremors, palpitations, intolerance of heat or cold, polyuria, polydipsia.  GI: Denies abdominal pain, nausea, vomiting, bloody stools or diarrhea  : Denies frequency, urgency, incontinence, retention, or nocturia.  Musculoskeletal: Denies numbness, tingling or loss of motor function except as above  Integument: Denies rash, lesion or ulceration   Neurologic: Denies headache or focal weakness, deficits  Heme: Denies spontaneous or excessive bleeding, epistaxis, hematuria, melena, fatigue, enlarged or tender lymph nodes.      All other pertinent positives and negatives as noted above in HPI.    Physical Exam: 84 y.o. female    Vitals:    11/20/24 1545   Temp: 98.6 °F (37 °C)   Weight: 50.3 kg (111 lb)   Height: 161.3 cm (63.5\")       General:  Patient is awake and alert.  Appears in no acute distress or discomfort.    Psych:  Affect and demeanor are appropriate.    Left upper extremity:  Sling was in place and removed.  She has some healing lacerations over the dorsum of the forearm and wrist.  Otherwise, her skin is benign and intact.  Focal tenderness noted over the lateral aspect of the elbow and radiocapitellar articulation.  No tenderness about the remainder of the elbow, upper arm, forearm, wrist or hand.  No palpable masses or adenopathy.  Compartments soft.  Elbow range of motion: ext/flex , pron/sup 70/70.  No obvious mechanical blocks.  Could not assess stability due to discomfort with motion.  Good strength in the wrist with flexion and extension as well as , pinch, finger and thumb abduction.  Intact sensation.  Brisk cap refill.  Palpable radial pulse.     Diagnostic Tests:  Lab Results   Component " "Value Date    GLUCOSE 89 07/03/2024    CALCIUM 8.9 07/03/2024     (L) 07/03/2024    K 4.2 07/03/2024    CO2 26.0 07/03/2024     07/03/2024    BUN 9 07/03/2024    CREATININE 0.66 07/03/2024    EGFRIFAFRI 50 (L) 04/28/2017    EGFRIFNONA 80 05/14/2021    BCR 13.6 07/03/2024    ANIONGAP 7.0 07/03/2024     Lab Results   Component Value Date    WBC 8.46 07/03/2024    HGB 13.0 07/03/2024    HCT 38.6 07/03/2024    MCV 99.2 (H) 07/03/2024     07/03/2024     No results found for: \"INR\", \"PROTIME\"    Imaging:  Outside AP and lateral views of the left elbow are reviewed along with the associated report.  There is a minimally displaced radial head fracture.  No other significant findings noted.    Assessment:  Left radial head fracture    Plan:  I recommend closed treatment of this injury.  Risks were discussed including nonunion, malunion, displacement necessitating operative intervention, arthrofibrosis, and persistent pain or motion loss necessitating further intervention.  I am fine with her beginning to work on early active motion at this point.  We discussed appropriate activity modifications and restrictions including no lifting greater than 2 pounds, pushing, or pulling with the affected arm.  I want to see her back in 1 month for repeat x-rays and reevaluation.    Date: 11/20/2024    Sabino Harley MD    "

## 2024-12-18 ENCOUNTER — OFFICE VISIT (OUTPATIENT)
Dept: ORTHOPEDIC SURGERY | Facility: CLINIC | Age: 84
End: 2024-12-18
Payer: MEDICARE

## 2024-12-18 VITALS — HEIGHT: 64 IN | WEIGHT: 110.3 LBS | BODY MASS INDEX: 18.83 KG/M2 | TEMPERATURE: 98.3 F

## 2024-12-18 DIAGNOSIS — S52.125A CLOSED NONDISPLACED FRACTURE OF HEAD OF LEFT RADIUS, INITIAL ENCOUNTER: Primary | ICD-10-CM

## 2024-12-18 NOTE — PROGRESS NOTES
Bhumi Gimenez : 1940 MRN: [id number] DATE: 2024    CC: Follow-up regarding radial head fracture    HPI: Patient returns to clinic today for follow-up.  Reports pain and motion are improved.  No complaints or problems.    Vitals:    24 0918   Temp: 98.3 °F (36.8 °C)       Exam: Skin is benign.  Compartments are soft.  Elbow range of motion is better.  Extension/flexion: .  Pronation/supination: 80/80    Imaging: AP and lateral views of the left elbow are ordered and reviewed for comparison purposes.  These are compared to previous x-rays.  Fracture appears in unchanged alignment.  There is early callous formation.    Impression:  4 weeks s/p closed treatment of left radial head fracture    Plan:    She demonstrates evidence for both clinical and radiographic healing.  Continue working on range of motion.  Recommend no lifting, pushing or pulling greater than 2 pounds.  Follow-up in 4 weeks.    Sabino Harley MD

## 2024-12-23 RX ORDER — AMLODIPINE BESYLATE 2.5 MG/1
2.5 TABLET ORAL DAILY
Qty: 90 TABLET | Refills: 1 | Status: SHIPPED | OUTPATIENT
Start: 2024-12-23

## 2025-01-14 ENCOUNTER — TELEPHONE (OUTPATIENT)
Dept: ORTHOPEDIC SURGERY | Facility: CLINIC | Age: 85
End: 2025-01-14

## 2025-01-14 NOTE — TELEPHONE ENCOUNTER
Provider: [facility]A    Caller: Bhumi Gimenez    Relationship to Patient: Self    Phone Number: [phone]    Reason for Call: PATIENT IS ASKING IF SHE CAN GET A LEFT SHOULDER INJECTION AS WELL WHEN SHE COMES IN FOR HER APPT ON 01/20/25

## 2025-01-14 NOTE — TELEPHONE ENCOUNTER
Caller: Bhumi Gimenez    Relationship: Self    Best call back number: [phone]      What was the call regarding: PT AWARE L SHOULDER SARAH INJ ADDED TO APPT ON 01/20/25.

## 2025-02-03 ENCOUNTER — OFFICE VISIT (OUTPATIENT)
Dept: ORTHOPEDIC SURGERY | Facility: CLINIC | Age: 85
End: 2025-02-03
Payer: MEDICARE

## 2025-02-03 VITALS — BODY MASS INDEX: 18.49 KG/M2 | WEIGHT: 108.3 LBS | HEIGHT: 64 IN | TEMPERATURE: 98.2 F

## 2025-02-03 DIAGNOSIS — S52.125A CLOSED NONDISPLACED FRACTURE OF HEAD OF LEFT RADIUS, INITIAL ENCOUNTER: Primary | ICD-10-CM

## 2025-02-03 DIAGNOSIS — M25.512 CHRONIC LEFT SHOULDER PAIN: ICD-10-CM

## 2025-02-03 DIAGNOSIS — G89.29 CHRONIC LEFT SHOULDER PAIN: ICD-10-CM

## 2025-02-03 RX ORDER — METHYLPREDNISOLONE ACETATE 80 MG/ML
80 INJECTION, SUSPENSION INTRA-ARTICULAR; INTRALESIONAL; INTRAMUSCULAR; SOFT TISSUE
Status: COMPLETED | OUTPATIENT
Start: 2025-02-03 | End: 2025-02-03

## 2025-02-03 RX ORDER — LIDOCAINE HYDROCHLORIDE 10 MG/ML
2 INJECTION, SOLUTION EPIDURAL; INFILTRATION; INTRACAUDAL; PERINEURAL
Status: COMPLETED | OUTPATIENT
Start: 2025-02-03 | End: 2025-02-03

## 2025-02-03 RX ADMIN — LIDOCAINE HYDROCHLORIDE 2 ML: 10 INJECTION, SOLUTION EPIDURAL; INFILTRATION; INTRACAUDAL; PERINEURAL at 15:57

## 2025-02-03 RX ADMIN — METHYLPREDNISOLONE ACETATE 80 MG: 80 INJECTION, SUSPENSION INTRA-ARTICULAR; INTRALESIONAL; INTRAMUSCULAR; SOFT TISSUE at 15:57

## 2025-02-03 NOTE — PROGRESS NOTES
Chief complaint: Follow-up left radial head fracture, follow-up chronic left shoulder pain    Bhumi follows up for her left elbow.  She says it is doing great.  She says the pain is resolved and she has recovered full motion.  Denies any complaints or concerns with the elbow.  She is having more shoulder pain.  She is responded well to injections in the past.  She has a golf trip to [place] coming up in the near future.  She would like to get another injection today for the left shoulder.    Left upper extremity.  Skin of the shoulder is benign.  Mild anterior tenderness.  She has good motion but she has discomfort and weakness with both abduction and forward elevation.  At her elbow, she does not have any bony tenderness.  She can fully range the elbow now compared to the other side.  She can push and pull against resistance with good strength and no pain.    AP and lateral views left elbow are ordered and reviewed evaluate her fracture.  These compared to previous x-rays.  The radial head fracture appears to be healing nicely.  No new or concerning findings.    Assessment: 1.  2-month status post closed treatment left radial head fracture 2.  Left rotator cuff tear arthropathy    Plan: Her elbow seems to be doing fine.  I am okay with her progressing her activities as tolerated.  Going forward, she can follow-up as needed for that issue.    We discussed options for her left shoulder.  She wanted to get an injection today.  The risk, benefits and alternatives were discussed.  She consented and the procedure was performed as described below.      Large Joint Arthrocentesis: L glenohumeral  Date/Time: 2/3/2025 3:57 PM  Consent given by: patient  Site marked: site marked  Timeout: Immediately prior to procedure a time out was called to verify the correct patient, procedure, equipment, support staff and site/side marked as required   Supporting Documentation  Indications: pain   Procedure Details  Location:  shoulder - L glenohumeral  Preparation: Patient was prepped and draped in the usual sterile fashion  Needle gauge: 21 G.  Approach: anterior  Medications administered: 2 mL lidocaine PF 1% 1 %; 80 mg methylPREDNISolone acetate 80 MG/ML  Patient tolerance: patient tolerated the procedure well with no immediate complications

## 2025-02-04 RX ORDER — ATORVASTATIN CALCIUM 10 MG/1
10 TABLET, FILM COATED ORAL DAILY
Qty: 90 TABLET | Refills: 1 | Status: SHIPPED | OUTPATIENT
Start: 2025-02-04

## 2025-03-03 RX ORDER — DIGOXIN 125 MCG
TABLET ORAL
Qty: 38 TABLET | Refills: 1 | Status: SHIPPED | OUTPATIENT
Start: 2025-03-03

## 2025-03-06 NOTE — TELEPHONE ENCOUNTER
I spoke with her and gave her the results.  She tells me that she is potentially interested in pursuing an arthroplasty.  She wants to think it over but is tentatively leaning that way for now.  I will have my  call her.  I did tell her that I would want to see her again for a preoperative visit.   Detail Level: Detailed General Sunscreen Counseling: I recommended a broad spectrum sunscreen with a SPF of 30 or higher.  I explained that SPF 30 sunscreens block approximately 97 percent of the sun's harmful rays.  Sunscreens should be applied at least 15 minutes prior to expected sun exposure and then every 2 hours after that as long as sun exposure continues. If swimming or exercising sunscreen should be reapplied every 45 minutes to an hour after getting wet or sweating.  One ounce, or the equivalent of a shot glass full of sunscreen, is adequate to protect the skin not covered by a bathing suit. I also recommended a lip balm with a sunscreen as well. Sun protective clothing can be used in lieu of sunscreen but must be worn the entire time you are exposed to the sun's rays.

## 2025-04-22 ENCOUNTER — OFFICE VISIT (OUTPATIENT)
Dept: CARDIOLOGY | Age: 85
End: 2025-04-22
Payer: MEDICARE

## 2025-04-22 VITALS
SYSTOLIC BLOOD PRESSURE: 102 MMHG | BODY MASS INDEX: 19.43 KG/M2 | DIASTOLIC BLOOD PRESSURE: 70 MMHG | WEIGHT: 113.8 LBS | HEIGHT: 64 IN | OXYGEN SATURATION: 98 % | HEART RATE: 91 BPM

## 2025-04-22 DIAGNOSIS — M06.9 RHEUMATOID ARTHRITIS INVOLVING MULTIPLE SITES, UNSPECIFIED WHETHER RHEUMATOID FACTOR PRESENT: ICD-10-CM

## 2025-04-22 DIAGNOSIS — I10 ESSENTIAL HYPERTENSION: ICD-10-CM

## 2025-04-22 DIAGNOSIS — R06.09 DYSPNEA ON EXERTION: ICD-10-CM

## 2025-04-22 DIAGNOSIS — I51.81 TAKOTSUBO CARDIOMYOPATHY: ICD-10-CM

## 2025-04-22 DIAGNOSIS — I25.10 CORONARY ARTERY DISEASE, NON-OCCLUSIVE: ICD-10-CM

## 2025-04-22 DIAGNOSIS — I38 VALVULAR HEART DISEASE: ICD-10-CM

## 2025-04-22 DIAGNOSIS — I48.21 PERMANENT ATRIAL FIBRILLATION: Primary | ICD-10-CM

## 2025-04-22 PROCEDURE — 1159F MED LIST DOCD IN RCRD: CPT | Performed by: NURSE PRACTITIONER

## 2025-04-22 PROCEDURE — 99214 OFFICE O/P EST MOD 30 MIN: CPT | Performed by: NURSE PRACTITIONER

## 2025-04-22 PROCEDURE — 93000 ELECTROCARDIOGRAM COMPLETE: CPT | Performed by: NURSE PRACTITIONER

## 2025-04-22 PROCEDURE — 1160F RVW MEDS BY RX/DR IN RCRD: CPT | Performed by: NURSE PRACTITIONER

## 2025-04-22 NOTE — PROGRESS NOTES
Date of Office Visit: 2025  Encounter Provider: ANTONY Onofre  Place of Service: [facility] CARDIOLOGY  Patient Name: Bhumi Gimenez  :1940  Primary Cardiologist: Dr. Arcadio Miguel     No chief complaint on file.  :     HPI: Bhumi Gimenez is a 85 y.o. female who presents today for 6-month cardiac follow-up visit.  I reviewed her medical records.    She has known hypertension, rheumatoid arthritis, and chronic DVT of the proximal veins bilaterally.  She has permanent atrial fibrillation and has remained on apixaban, carvedilol, and digoxin.  She has a remote history of Takotsubo Cardiomyopathy.    In 2023, she reported dyspnea with exertion with inclines only.  Echocardiogram showed normal LVEF and valvular heart disease.  In 2023, myocardial perfusion study was normal.  She continued to have persistent dyspnea and underwent coronary angiography in 2023 which showed mild nonobstructive CAD.    In 2024, echocardiogram showed LVEF 63%, LVH, diastolic function indeterminate, biatrial dilation, moderate aortic stenosis, moderate aortic regurgitation, mitral valve calcification/thickening, mild mitral regurgitation, moderate tricuspid regurgitation, and trace pulmonic regurgitation.    She presents today for follow-up visit.  She remains very active and still loves to play golf.  She is going to pain her first golf game of the season later this afternoon.  Her liver enzymes were recently elevated and her rheumatologist stopped her methotrexate.  She is questioning if she can take Tylenol for pain?  She continues to experience dyspnea with inclines only.  She denies chest pain, dyspnea with normal activities, PND, orthopnea, edema, palpitations, dizziness, syncope, or bleeding.  Blood pressure and heart rate are excellent.      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    Added automatically from request for surgery [id number]    Esophageal reflux     Full dentures     Hepatomegaly     LIKELY SECONDARY TO ALCOHOL USE    History of alcohol use     quit 11/13    History of transfusion     Hypertension     IBS (irritable bowel syndrome)     with diarrhea    Ileus, unspecified 05/14/2021    Left foot drop     LEFT FOOD DROP SECONDARY TO PERONEAL NERVE INJURY    Neck pain     Nonrheumatic aortic valve stenosis 08/11/2023    Osteoarthritis     OSTEO    Pancreatic pseudocyst resolved on CT 5/13/21 12/05/2016    Peripheral neuropathy     LEFT LOWER EXTREMITY    Permanent atrial fibrillation 08/11/2023    Personal history of gallstones     Personal history of malignant neoplasm of skin     S/P REMOVAL FROM LEFT KNEE    Primary osteoarthritis of both hands     Rheumatoid arthritis of multiple sites without organ or system involvement with positive rheumatoid factor 05/12/2021    Takotsubo syndrome     Likely secondary to acute illness in 11/13.  EF as low as 20-25% 11/27/13.  EF 63% by echo 4/14/14.    Thrombocytopenia        Past Surgical History:   Procedure Laterality Date    CARDIAC CATHETERIZATION N/A 12/20/2023    Procedure: Coronary angiography;  Surgeon: Myrna Ulloa MD;  Location: [facility] CATH INVASIVE LOCATION;  Service: Cardiovascular;  Laterality: N/A;    CHOLECYSTECTOMY      COLONOSCOPY N/A 09/08/2022    Procedure: COLONOSCOPY to cecum;  Surgeon: Jhonatan Casey Jr., MD;  Location: [facility] ENDOSCOPY;  Service: General;  Laterality: N/A;  pre: screening  post: normal    CORNEAL TRANSPLANT      ENDOSCOPY N/A 09/08/2022    Procedure: ESOPHAGOGASTRODUODENOSCOPY with cold biopsies;  Surgeon:  Jhonatan Casey Jr., MD;  Location: [facility] ENDOSCOPY;  Service: General;  Laterality: N/A;  pre: RUQ abdominal pain  post: esophagitis and mild gastritis    EPIDURAL BLOCK      MULTIPLE TOOTH EXTRACTIONS      FULL MOUTH TEETH REMOVED    TOTAL SHOULDER ARTHROPLASTY W/ DISTAL CLAVICLE EXCISION Right 11/16/2017    Procedure: Reverse Total Shoulder Arthroplasty;  Surgeon: Sabino Harley MD;  Location: [facility] MAIN OR;  Service:        Social History     Socioeconomic History    Marital status: Single   Tobacco Use    Smoking status: Never     Passive exposure: Never    Smokeless tobacco: Never   Vaping Use    Vaping status: Never Used   Substance and Sexual Activity    Alcohol use: No    Drug use: No    Sexual activity: Not Currently       Family History   Problem Relation Age of Onset    Alcohol abuse Mother     Other Mother         CORONARY ARTERIOSCLEROSIS    Cancer Father         30 years ago    Malig Hyperthermia Neg Hx        The following portion of the patient's history were reviewed and updated as appropriate: past medical history, past surgical history, past social history, past family history, allergies, current medications, and problem list.    Review of Systems   Constitutional: Negative.   Cardiovascular:  Positive for dyspnea on exertion (inclines only).   Respiratory: Negative.     Hematologic/Lymphatic: Negative.    Musculoskeletal:  Positive for joint pain.   Neurological: Negative.        No Known Allergies      Current Outpatient Medications:     acetaminophen (TYLENOL) 325 MG tablet, Take 2 tablets by mouth Every 4 (Four) Hours As Needed for Mild Pain ., Disp: , Rfl:     amLODIPine (NORVASC) 2.5 MG tablet, Take 1 tablet by mouth Daily., Disp: 90 tablet, Rfl: 1    apixaban (ELIQUIS) 2.5 MG tablet tablet, Take 1 tablet by mouth Every 12 (Twelve) Hours. Resumed on 12/22/2023.  Indications: Atrial Fibrillation, Disp: , Rfl:     atorvastatin (LIPITOR) 10 MG tablet, TAKE 1 TABLET BY MOUTH DAILY, Disp: 90  "tablet, Rfl: 1    carvedilol (COREG) 25 MG tablet, Take 1 tablet by mouth 2 (Two) Times a Day With Meals., Disp: 60 tablet, Rfl: 2    cyclobenzaprine (FLEXERIL) 10 MG tablet, Take 1 tablet by mouth Every 8 (Eight) Hours As Needed for Muscle Spasms., Disp: 60 tablet, Rfl: 1    digoxin (LANOXIN) 125 MCG tablet, TAKE 1 TABLET ON MONDAY, WEDNESDAY, AND FRIDAYS, Disp: 38 tablet, Rfl: 1    ferrous sulfate (FeroSul) 325 (65 FE) MG tablet, Take 1 tablet by mouth Daily With Breakfast., Disp: 30 tablet, Rfl: 0    fluorometholone (FML) 0.1 % ophthalmic suspension, SHAKE GENTLY AND INSTILL ONE DROP IN RIGHT EYE BID., Disp: , Rfl: 3    folic acid (FOLVITE) 1 MG tablet, Take 1 tablet by mouth Daily., Disp: , Rfl:     furosemide (Lasix) 20 MG tablet, Take 1 tablet by mouth Daily., Disp: 30 tablet, Rfl: 0    HYDROcodone-acetaminophen (NORCO) 5-325 MG per tablet, Take 1 tablet by mouth Every 6 (Six) Hours As Needed for Moderate Pain., Disp: 10 tablet, Rfl: 0    loteprednol (LOTEMAX) 0.5 % ophthalmic suspension, Administer 1 drop to both eyes 2 (Two) Times a Day., Disp: , Rfl:     ofloxacin (OCUFLOX) 0.3 % ophthalmic solution, INSTILL 1 DROP INTO RIGHT EYE 3 TIMES A DAY FOR 1 WEEK, Disp: , Rfl: 0    pantoprazole (PROTONIX) 40 MG EC tablet, Take 1 tablet by mouth daily., Disp: 90 tablet, Rfl: 3    potassium chloride (KLOR-CON M20) 20 MEQ CR tablet, Daily., Disp: , Rfl:          Objective:     Vitals:    04/22/25 1002   BP: 102/70   BP Location: Left arm   Patient Position: Sitting   Cuff Size: Adult   Pulse: 91   SpO2: 98%   Weight: 51.6 kg (113 lb 12.8 oz)   Height: 161.3 cm (63.5\")     Body mass index is 19.84 kg/m².    PHYSICAL EXAM:    Vitals Reviewed.   General Appearance: No acute distress, well developed and well nourished. Thin.   Eyes: Glasses.   HENT: No hearing loss noted.    Respiratory: No signs of respiratory distress. Respiration rhythm and depth normal.  Clear to auscultation.   Cardiovascular:  Jugular Venous " Pressure: Normal  Heart Rate and Rhythm: Irregular, irregular.  Heart Sounds: Normal S1 and S2. No S3 or S4 noted.  Murmurs: RUSB grade 2/6 systolic murmur present.  Lower Extremities: No edema noted.  Musculoskeletal: Normal movement of extremities.  Skin: General appearance normal.  Tanned skin.  Psychiatric: Patient alert and oriented to person, place, and time. Speech and behavior appropriate. Normal mood and affect.       ECG 12 Lead    Date/Time: 4/22/2025 10:03 AM  Performed by: Maira Jay APRN    Authorized by: Maira Jay APRN  Comparison: compared with previous ECG from 7/3/2024  Similar to previous ECG  Rhythm: atrial fibrillation  Rate: normal  BPM: 91  Conduction: conduction normal  ST Depression: V3, V4, V5 and V6  T Waves: T waves normal  QRS axis: normal    Clinical impression: abnormal EKG            Assessment:       Diagnosis Plan   1. Nonrheumatic aortic valve insufficiency  Adult Transthoracic Echo Complete w/ Color, Spectral and Contrast if Necessary Per Protocol      2. Takotsubo cardiomyopathy  Adult Transthoracic Echo Complete w/ Color, Spectral and Contrast if Necessary Per Protocol      3. Permanent atrial fibrillation  Adult Transthoracic Echo Complete w/ Color, Spectral and Contrast if Necessary Per Protocol      4. Essential hypertension  Adult Transthoracic Echo Complete w/ Color, Spectral and Contrast if Necessary Per Protocol             Plan:       1.  Permanent Atrial Fibrillation: Asymptomatic and rate controlled on carvedilol and digoxin.  Her PCP follows her digoxin level at his office.  She remains on apixaban.    2.  Valvular heart disease: She has known moderate aortic stenosis, moderate aortic regurgitation, mild mitral regurgitation, and moderate tricuspid regurgitation.  Heart murmur present.  Recheck echocardiogram in October 2025.    3.  Nonobstructive coronary artery disease per cardiac catheterization in December 2023.  She is not on aspirin because she is  chronically anticoagulated with apixaban.  She remains on atorvastatin.    4.  Chronic dyspnea with exertion when going up inclines.    5.  Remote history of Takotsubo Cardiomyopathy.  Resolved.    6.  Hypertension: Blood pressure excellent today.    7.  She has rheumatoid arthritis.  Recently her methotrexate was discontinued because of elevated liver enzymes.  She is questioning if she can use Tylenol and that asked her to call her rheumatologist.    8.  She will follow-up with Dr. Miguel in October and have a same-day echocardiogram completed that day.  Call with any further cardiac concerns.    As always, it has been a pleasure to participate in your patient's care. Thank you.         Sincerely,         ANTONY Mclaughlin  [facility]      Dictated utilizing Dragon Dictation

## 2025-06-25 ENCOUNTER — OFFICE VISIT (OUTPATIENT)
Dept: ORTHOPEDIC SURGERY | Facility: CLINIC | Age: 85
End: 2025-06-25
Payer: MEDICARE

## 2025-06-25 VITALS — HEIGHT: 64 IN | TEMPERATURE: 98.3 F | WEIGHT: 112.8 LBS | BODY MASS INDEX: 19.26 KG/M2

## 2025-06-25 DIAGNOSIS — Z09 SURGERY FOLLOW-UP: Primary | ICD-10-CM

## 2025-06-25 DIAGNOSIS — M19.012 ARTHRITIS OF LEFT SHOULDER: ICD-10-CM

## 2025-06-25 RX ORDER — METHYLPREDNISOLONE ACETATE 40 MG/ML
80 INJECTION, SUSPENSION INTRA-ARTICULAR; INTRALESIONAL; INTRAMUSCULAR; SOFT TISSUE
Status: COMPLETED | OUTPATIENT
Start: 2025-06-25 | End: 2025-06-25

## 2025-06-25 RX ORDER — LIDOCAINE HYDROCHLORIDE 10 MG/ML
2 INJECTION, SOLUTION EPIDURAL; INFILTRATION; INTRACAUDAL; PERINEURAL
Status: COMPLETED | OUTPATIENT
Start: 2025-06-25 | End: 2025-06-25

## 2025-06-25 RX ADMIN — LIDOCAINE HYDROCHLORIDE 2 ML: 10 INJECTION, SOLUTION EPIDURAL; INFILTRATION; INTRACAUDAL; PERINEURAL at 08:35

## 2025-06-25 RX ADMIN — METHYLPREDNISOLONE ACETATE 80 MG: 40 INJECTION, SUSPENSION INTRA-ARTICULAR; INTRALESIONAL; INTRAMUSCULAR; SOFT TISSUE at 08:35

## 2025-06-25 NOTE — PROGRESS NOTES
..Large Joint Arthrocentesis: L glenohumeral  Date/Time: 6/25/2025 8:35 AM  Consent given by: patient  Site marked: site marked  Timeout: Immediately prior to procedure a time out was called to verify the correct patient, procedure, equipment, support staff and site/side marked as required   Supporting Documentation  Indications: pain   Procedure Details  Location: shoulder - L glenohumeral  Preparation: Patient was prepped and draped in the usual sterile fashion  Needle gauge: 21G.  Approach: posterior  Medications administered: 2 mL lidocaine PF 1% 1 %; 80 mg methylPREDNISolone acetate 40 MG/ML  Patient tolerance: patient tolerated the procedure well with no immediate complications     Ms. Gimenez would like to get a repeat injection today.  The risks, benefits and alternatives were discussed and the patient consented.  Going forward, the patient will follow-up as needed.    Sabino Harley MD    06/25/2025

## 2025-07-31 RX ORDER — AMLODIPINE BESYLATE 2.5 MG/1
2.5 TABLET ORAL DAILY
Qty: 90 TABLET | Refills: 3 | Status: SHIPPED | OUTPATIENT
Start: 2025-07-31

## 2025-08-19 RX ORDER — DIGOXIN 125 MCG
TABLET ORAL
Qty: 90 TABLET | Refills: 3 | Status: SHIPPED | OUTPATIENT
Start: 2025-08-19

## (undated) DEVICE — PIN STNMAN 3.2MM 9IN
Type: IMPLANTABLE DEVICE | Site: SHOULDER | Status: NON-FUNCTIONAL
Removed: 2017-11-16

## (undated) DEVICE — GW EMR FIX EXCHG J STD .035 3MM 260CM

## (undated) DEVICE — NDL SPINE 22G 31/2IN BLK

## (undated) DEVICE — KT MANIFLD CARDIAC

## (undated) DEVICE — SENSR O2 OXIMAX FNGR A/ 18IN NONSTR

## (undated) DEVICE — CATH DIAG IMPULSE FR4 5F 100CM

## (undated) DEVICE — DRAPE,U/ SHT,SPLIT,PLAS,STERIL: Brand: MEDLINE

## (undated) DEVICE — TUBING, SUCTION, 1/4" X 10', STRAIGHT: Brand: MEDLINE

## (undated) DEVICE — THE VASC BAND HEMOSTAT IS A COMPRESSION DEVICE TO ASSIST HEMOSTASIS OF ARTERIAL, VENOUS AND HEMODIALYSIS PERCUTANEOUS ACCESS SITES.: Brand: VASC BAND™ HEMOSTAT

## (undated) DEVICE — 3M™ IOBAN™ 2 ANTIMICROBIAL INCISE DRAPE 6640EZ: Brand: IOBAN™ 2

## (undated) DEVICE — 2108 SERIES SAGITTAL BLADE (19.5 X 1.27 X 81.0MM)

## (undated) DEVICE — GLV SURG TRIUMPH CLASSIC PF LTX 8.5 STRL

## (undated) DEVICE — APPL CHLORAPREP W/TINT 26ML ORNG

## (undated) DEVICE — HANDPIECE SET WITH COAXIAL HIGH FLOW TIP AND SUCTION TUBE: Brand: INTERPULSE

## (undated) DEVICE — PK SHLDR OPN 40

## (undated) DEVICE — POOLE SUCTION HANDLE: Brand: CARDINAL HEALTH

## (undated) DEVICE — ADAPT CLN BIOGUARD AIR/H2O DISP

## (undated) DEVICE — CATH DIAG IMPULSE FL3.5 5F 100CM

## (undated) DEVICE — BIT DRL SCRW PERIPH 2.7MM

## (undated) DEVICE — FRCP BX RADJAW4 NDL 2.8 240CM LG OG BX40

## (undated) DEVICE — MAT FLR ABSORBENT LG 4FT 10 2.5FT

## (undated) DEVICE — DRSNG TELFA PAD NONADH STR 1S 3X8IN

## (undated) DEVICE — PREP SOL POVIDONE/IODINE BT 4OZ

## (undated) DEVICE — BITEBLOCK OMNI BLOC

## (undated) DEVICE — SYR LUERLOK 30CC

## (undated) DEVICE — SUT VIC 2/0 CT2 27IN J269H

## (undated) DEVICE — GLIDESHEATH SLENDER STAINLESS STEEL KIT: Brand: GLIDESHEATH SLENDER

## (undated) DEVICE — GLV SURG BIOGEL LTX PF 8 1/2

## (undated) DEVICE — MSK ENDO PORT O2 POM ELITE CURAPLEX A/

## (undated) DEVICE — PK CATH CARD 40

## (undated) DEVICE — DRSNG SURESITE WNDW 4X4.5

## (undated) DEVICE — KT ORCA ORCAPOD DISP STRL

## (undated) DEVICE — TUBING, SUCTION, 1/4" X 20', STRAIGHT: Brand: MEDLINE INDUSTRIES, INC.

## (undated) DEVICE — LN SMPL CO2 SHTRM SD STREAM W/M LUER

## (undated) DEVICE — ARM SLING: Brand: DEROYAL

## (undated) DEVICE — SKIN PREP TRAY W/CHG: Brand: MEDLINE INDUSTRIES, INC.

## (undated) DEVICE — SUT SILK 2/0 TIES 18IN A185H

## (undated) DEVICE — ADHS SKIN DERMABOND TOP ADVANCED

## (undated) DEVICE — SOL ISO/ALC RUB 70PCT 4OZ